# Patient Record
Sex: MALE | Race: WHITE | NOT HISPANIC OR LATINO | Employment: OTHER | URBAN - METROPOLITAN AREA
[De-identification: names, ages, dates, MRNs, and addresses within clinical notes are randomized per-mention and may not be internally consistent; named-entity substitution may affect disease eponyms.]

---

## 2022-05-11 ENCOUNTER — OFFICE VISIT (OUTPATIENT)
Dept: CARDIOLOGY CLINIC | Facility: CLINIC | Age: 77
End: 2022-05-11
Payer: COMMERCIAL

## 2022-05-11 VITALS
HEIGHT: 69 IN | DIASTOLIC BLOOD PRESSURE: 70 MMHG | BODY MASS INDEX: 27.99 KG/M2 | WEIGHT: 189 LBS | HEART RATE: 60 BPM | SYSTOLIC BLOOD PRESSURE: 120 MMHG

## 2022-05-11 DIAGNOSIS — I25.10 ATHEROSCLEROSIS OF NATIVE CORONARY ARTERY OF NATIVE HEART WITHOUT ANGINA PECTORIS: Primary | ICD-10-CM

## 2022-05-11 DIAGNOSIS — I10 PRIMARY HYPERTENSION: ICD-10-CM

## 2022-05-11 DIAGNOSIS — E78.2 MIXED HYPERLIPIDEMIA: ICD-10-CM

## 2022-05-11 DIAGNOSIS — I35.0 NONRHEUMATIC AORTIC VALVE STENOSIS: ICD-10-CM

## 2022-05-11 PROCEDURE — 99215 OFFICE O/P EST HI 40 MIN: CPT | Performed by: INTERNAL MEDICINE

## 2022-05-11 RX ORDER — HYDROCHLOROTHIAZIDE 12.5 MG/1
12.5 TABLET ORAL DAILY
COMMUNITY
Start: 2022-04-03

## 2022-05-11 RX ORDER — ASPIRIN 81 MG/1
81 TABLET ORAL DAILY
COMMUNITY
Start: 2022-05-11

## 2022-05-11 RX ORDER — AMLODIPINE BESYLATE 2.5 MG/1
2.5 TABLET ORAL DAILY
COMMUNITY
Start: 2022-03-12

## 2022-05-11 RX ORDER — LISINOPRIL 20 MG/1
20 TABLET ORAL DAILY
COMMUNITY
Start: 2022-03-01

## 2022-05-11 RX ORDER — ATORVASTATIN CALCIUM 20 MG/1
20 TABLET, FILM COATED ORAL DAILY
COMMUNITY
Start: 2022-04-03

## 2022-05-11 RX ORDER — METOPROLOL SUCCINATE 25 MG/1
25 TABLET, EXTENDED RELEASE ORAL DAILY
COMMUNITY

## 2022-05-11 NOTE — PROGRESS NOTES
Consultation - Cardiology   Krish Mandujano 77 y o  male MRN: [id number]    Encounter: [id number]    Assessment/Plan     Assessment:     Coronary Atherosclerosis  Hypertension  Hyperlipidemia  Aortic Stenosis    Plan:    Coronary Atherosclerosis: based on Chest CT  Continue with aspirin and atorvastatin  He has no angina at this time  Hypertension: His BP Is currently well controlled on current medical therapy  Hyperlipidemia: Continue atorvastatin  Will get a copy of his last lipid panel  Aortic Stenosis: moderate last year  Update echocardiogram      History of Present Illness   Physician Requesting Consult: No att  providers found  Reason for Consult / Principal Problem: Coronary Atherosclerosis  HPI: Krish Mandujano is a 77y o  year old male who presents with a history of coronary atherosclerosis, HTN and dyslipidemia  He had an echocardiogram last year that revealed moderate aortic stenosis  He recently moved to this area  He has no chest pain, dyspnea, palptiations or fatigue  He has no prior history of obstructive CAD  He has had a CT calcium score in the past that revealed coronary athero  Former smoker    Mother passed away from acute MI at age 77  Father had CAD and CABG  Review of Systems   Constitutional: Negative  HENT: Negative  Eyes: Negative  Cardiovascular: Negative  Respiratory: Negative  Endocrine: Negative  Hematologic/Lymphatic: Negative  Skin: Negative  Musculoskeletal: Negative  Gastrointestinal: Negative  Genitourinary: Negative  Neurological: Negative  Psychiatric/Behavioral: Negative  Allergic/Immunologic: Negative  Historical Information   No past medical history on file  No past surgical history on file      Social History:  Social History     Substance and Sexual Activity   Alcohol Use Not on file     Social History     Substance and Sexual Activity   Drug Use Not on file     Social History     Tobacco Use   Smoking Status Not on file   Smokeless Tobacco Not on file       Family History:   No family history on file  Meds/Allergies   No Known Allergies    Current Outpatient Medications:     amLODIPine (NORVASC) 2 5 mg tablet, Take 2 5 mg by mouth in the morning , Disp: , Rfl:     atorvastatin (LIPITOR) 20 mg tablet, Take 20 mg by mouth in the morning , Disp: , Rfl:     hydrochlorothiazide (HYDRODIURIL) 12 5 mg tablet, Take 12 5 mg by mouth in the morning , Disp: , Rfl:     lisinopril (ZESTRIL) 20 mg tablet, Take 20 mg by mouth in the morning , Disp: , Rfl:     metoprolol succinate (TOPROL-XL) 25 mg 24 hr tablet, Take 25 mg by mouth in the morning , Disp: , Rfl:     Vitals:   Pulse: 60  Blood Pressue: 120/70  Weight: 85 7 kg (189 lb)      Physical Exam  Constitutional:       General: He is not in acute distress  Appearance: He is well-developed  He is not diaphoretic  HENT:      Head: Normocephalic  Eyes:      General: No scleral icterus  Right eye: No discharge  Conjunctiva/sclera: Conjunctivae normal    Neck:      Vascular: No JVD  Cardiovascular:      Rate and Rhythm: Normal rate and regular rhythm  Heart sounds: Murmur heard  No friction rub  No gallop  Pulmonary:      Effort: Pulmonary effort is normal  No respiratory distress  Breath sounds: Normal breath sounds  No wheezing or rales  Abdominal:      General: Bowel sounds are normal  There is no distension  Palpations: Abdomen is soft  Tenderness: There is no abdominal tenderness  There is no rebound  Musculoskeletal:         General: No tenderness or deformity  Cervical back: Normal range of motion  Skin:     General: Skin is warm and dry  Neurological:      Mental Status: He is alert and oriented to person, place, and time           [unfilled]    Invasive Devices  Report    None                 No results found for: NA, CL, CO2, ANIONGAP, BUN, CREATININE, EGFR, GLUCOSE, CALCIUM, AST, ALT, ALKPHOS, PROT, BILITOT  No results found for: WBC, HGB, PLT  No components found for: TROP    Imaging:     EKG: NSR Normal ECG      Counseling / Coordination of Care  Total floor / unit time spent today 45 minutes  Greater than 50% of total time was spent with the patient and / or family counseling and / or coordination of care  A description of the counseling / coordination of care

## 2022-06-14 ENCOUNTER — HOSPITAL ENCOUNTER (OUTPATIENT)
Dept: NON INVASIVE DIAGNOSTICS | Facility: CLINIC | Age: 77
Discharge: HOME | End: 2022-06-14
Payer: COMMERCIAL

## 2022-06-14 VITALS
DIASTOLIC BLOOD PRESSURE: 70 MMHG | HEART RATE: 60 BPM | WEIGHT: 189 LBS | BODY MASS INDEX: 27.99 KG/M2 | SYSTOLIC BLOOD PRESSURE: 120 MMHG | HEIGHT: 69 IN

## 2022-06-14 DIAGNOSIS — I35.0 NONRHEUMATIC AORTIC VALVE STENOSIS: ICD-10-CM

## 2022-06-14 LAB
AORTIC ROOT: 3.2 CM
AORTIC VALVE MEAN VELOCITY: 26 M/S
APICAL FOUR CHAMBER EJECTION FRACTION: 69 %
ASCENDING AORTA: 3.5 CM
AV AREA BY CONTINUOUS VTI: 1.1 CM2
AV AREA PEAK VELOCITY: 1.2 CM2
AV LVOT MEAN GRADIENT: 3 MMHG
AV LVOT PEAK GRADIENT: 5 MMHG
AV MEAN GRADIENT: 32 MMHG
AV PEAK GRADIENT: 53 MMHG
AV VALVE AREA: 1.14 CM2
AV VELOCITY RATIO: 0.31
DOP CALC AO PEAK VEL: 3.8 M/S
DOP CALC AO VTI: 86.75 CM
DOP CALC LVOT AREA: 3.8 CM2
DOP CALC LVOT DIAMETER: 2.2 CM
DOP CALC LVOT PEAK VEL VTI: 26 CM
DOP CALC LVOT PEAK VEL: 1.17 M/S
DOP CALC LVOT STROKE INDEX: 49.5 ML/M2
DOP CALC LVOT STROKE VOLUME: 98.78 CM3
E WAVE DECELERATION TIME: 232 MS
FRACTIONAL SHORTENING: 41 % (ref 28–44)
INTERVENTRICULAR SEPTUM IN DIASTOLE (PARASTERNAL SHORT AXIS VIEW): 1.2 CM
INTERVENTRICULAR SEPTUM: 1.2 CM (ref 0.6–1.1)
LAAS-AP2: 21.9 CM2
LAAS-AP4: 17.1 CM2
LEFT ATRIUM AREA SYSTOLE SINGLE PLANE A4C: 17 CM2
LEFT ATRIUM SIZE: 3.9 CM
LEFT INTERNAL DIMENSION IN SYSTOLE: 2.4 CM (ref 2.1–4)
LEFT VENTRICULAR INTERNAL DIMENSION IN DIASTOLE: 4.1 CM (ref 3.5–6)
LEFT VENTRICULAR POSTERIOR WALL IN END DIASTOLE: 1 CM
LEFT VENTRICULAR STROKE VOLUME: 55 ML
LVSV (TEICH): 55 ML
MV E'TISSUE VEL-SEP: 9 CM/S
MV PEAK A VEL: 1.04 M/S
MV PEAK E VEL: 92 CM/S
MV STENOSIS PRESSURE HALF TIME: 67 MS
MV VALVE AREA P 1/2 METHOD: 3.28 CM2
RA PRESSURE ESTIMATED: 3 MMHG
RIGHT ATRIUM AREA SYSTOLE A4C: 16.7 CM2
RIGHT VENTRICLE ID DIMENSION: 3.7 CM
RV PSP: 35 MMHG
SL CV LEFT ATRIUM LENGTH A2C: 5.8 CM
SL CV LV EF: 65
SL CV PED ECHO LEFT VENTRICLE DIASTOLIC VOLUME (MOD BIPLANE) 2D: 75 ML
SL CV PED ECHO LEFT VENTRICLE SYSTOLIC VOLUME (MOD BIPLANE) 2D: 20 ML
TR MAX PG: 32 MMHG
TR PEAK VELOCITY: 2.8 M/S
TRICUSPID ANNULAR PLANE SYSTOLIC EXCURSION: 2.8 CM
TRICUSPID VALVE PEAK REGURGITATION VELOCITY: 2.83 M/S

## 2022-06-14 PROCEDURE — 93306 TTE W/DOPPLER COMPLETE: CPT

## 2022-06-14 PROCEDURE — 93306 TTE W/DOPPLER COMPLETE: CPT | Performed by: INTERNAL MEDICINE

## 2022-06-17 ENCOUNTER — TELEPHONE (OUTPATIENT)
Dept: CARDIOLOGY CLINIC | Facility: CLINIC | Age: 77
End: 2022-06-17

## 2022-06-17 NOTE — TELEPHONE ENCOUNTER
----- Message from Raul Abebe MD sent at 6/17/2022 12:52 PM EDT -----  Moderate aortic stenosis  Will continue to follow

## 2023-11-09 ENCOUNTER — OFFICE VISIT (OUTPATIENT)
Dept: URGENT CARE | Facility: CLINIC | Age: 78
End: 2023-11-09
Payer: COMMERCIAL

## 2023-11-09 VITALS
HEART RATE: 87 BPM | WEIGHT: 181.4 LBS | OXYGEN SATURATION: 97 % | SYSTOLIC BLOOD PRESSURE: 110 MMHG | RESPIRATION RATE: 18 BRPM | BODY MASS INDEX: 26.79 KG/M2 | DIASTOLIC BLOOD PRESSURE: 52 MMHG | TEMPERATURE: 97.9 F

## 2023-11-09 DIAGNOSIS — R06.2 WHEEZING: ICD-10-CM

## 2023-11-09 DIAGNOSIS — J20.9 ACUTE BRONCHITIS, UNSPECIFIED ORGANISM: Primary | ICD-10-CM

## 2023-11-09 PROCEDURE — 99213 OFFICE O/P EST LOW 20 MIN: CPT | Performed by: NURSE PRACTITIONER

## 2023-11-09 RX ORDER — ALBUTEROL SULFATE 90 UG/1
2 AEROSOL, METERED RESPIRATORY (INHALATION) EVERY 6 HOURS PRN
Qty: 8.5 G | Refills: 0 | Status: SHIPPED | OUTPATIENT
Start: 2023-11-09

## 2023-11-09 RX ORDER — AZITHROMYCIN 250 MG/1
TABLET, FILM COATED ORAL
Qty: 6 TABLET | Refills: 0 | Status: SHIPPED | OUTPATIENT
Start: 2023-11-09 | End: 2023-11-13

## 2023-11-09 RX ORDER — IPRATROPIUM BROMIDE AND ALBUTEROL SULFATE 2.5; .5 MG/3ML; MG/3ML
3 SOLUTION RESPIRATORY (INHALATION) ONCE
Status: COMPLETED | OUTPATIENT
Start: 2023-11-09 | End: 2023-11-09

## 2023-11-09 RX ADMIN — IPRATROPIUM BROMIDE AND ALBUTEROL SULFATE 3 ML: 2.5; .5 SOLUTION RESPIRATORY (INHALATION) at 10:58

## 2023-11-09 NOTE — PROGRESS NOTES
[facility]        NAME: Deepa Gabriel is a 69 y.o. male  : 1945    MRN: [id number]  DATE: 2023  TIME: 12:55 PM    Assessment and Plan   Acute bronchitis, unspecified organism [J20.9]  1. Acute bronchitis, unspecified organism  ipratropium-albuterol (DUO-NEB) 0.5-2.5 mg/3 mL inhalation solution 3 mL    albuterol (ProAir HFA) 90 mcg/act inhaler    azithromycin (ZITHROMAX) 250 mg tablet      2. Wheezing  ipratropium-albuterol (DUO-NEB) 0.5-2.5 mg/3 mL inhalation solution 3 mL    albuterol (ProAir HFA) 90 mcg/act inhaler        Acute symptomatic x10 days worsening in the last 24 to 48 hours. DuoNeb provided in office with improvement in symptoms and decreased wheezing on exam O2 persistent 97%. Will recommend start albuterol inhaler at home 2 puffs every 6-8 hours as needed and Z-Erich educated on side effects proper use of medication follow-up with primary care urgent care or ED if any worsening of symptoms such as shortness of breath worsening wheezing worsening of symptoms    Patient Instructions       Follow up with PCP in 3-5 days. Proceed to  ER if symptoms worsen. Chief Complaint     Chief Complaint   Patient presents with   • Cough     Patient states that 10days ago started with a cough, nasal congestion. Noticed yesterday that he is having chest congestion, is wheezing, now has a productive cough and feels tight in the chest.          History of Present Illness       Patient is a 67-year-old male arrives with 10 days of cold-like symptoms. Patient reports in the last 48 to 72 hours starting to feel worse with now chest tightness congestion cough productive mucus of whitish color. He was a previous smoker for approximately 10 years but quit 20 years ago. Did take an at home COVID rapid which was negative.   Denies shortness of breath chest pain dizziness and fever        Review of Systems   Review of Systems   Constitutional:  Negative for activity change, appetite change, chills, fatigue and fever. HENT:  Negative for congestion, rhinorrhea, sinus pressure, sinus pain and sore throat. Respiratory:  Positive for cough and chest tightness. Negative for shortness of breath and wheezing. Gastrointestinal:  Negative for constipation, diarrhea, nausea and vomiting. Musculoskeletal:  Negative for myalgias. Skin:  Negative for color change, pallor and rash. Neurological:  Negative for dizziness, syncope, weakness, light-headedness and headaches. Hematological:  Negative for adenopathy. Psychiatric/Behavioral:  Negative for agitation and confusion. Current Medications       Current Outpatient Medications:   •  albuterol (ProAir HFA) 90 mcg/act inhaler, Inhale 2 puffs every 6 (six) hours as needed for wheezing or shortness of breath, Disp: 8.5 g, Rfl: 0  •  azithromycin (ZITHROMAX) 250 mg tablet, Take 2 tablets today then 1 tablet daily x 4 days, Disp: 6 tablet, Rfl: 0  •  amLODIPine (NORVASC) 2.5 mg tablet, Take 2.5 mg by mouth in the morning., Disp: , Rfl:   •  aspirin (ECOTRIN LOW STRENGTH) 81 mg EC tablet, Take 1 tablet (81 mg total) by mouth in the morning., Disp: , Rfl:   •  atorvastatin (LIPITOR) 20 mg tablet, Take 20 mg by mouth in the morning., Disp: , Rfl:   •  hydrochlorothiazide (HYDRODIURIL) 12.5 mg tablet, Take 12.5 mg by mouth in the morning., Disp: , Rfl:   •  lisinopril (ZESTRIL) 20 mg tablet, Take 20 mg by mouth in the morning., Disp: , Rfl:   •  metoprolol succinate (TOPROL-XL) 25 mg 24 hr tablet, Take 25 mg by mouth in the morning., Disp: , Rfl:   No current facility-administered medications for this visit.     Current Allergies     Allergies as of 11/09/2023   • (No Known Allergies)            The following portions of the patient's history were reviewed and updated as appropriate: allergies, current medications, past family history, past medical history, past social history, past surgical history and problem list.     Past Medical History: Diagnosis Date   • Basal cell carcinoma (BCC)    • Coronary artery disease    • High blood pressure    • High cholesterol    • Prostate CA [facility])        Past Surgical History:   Procedure Laterality Date   • COLONOSCOPY  2014   • MOHS SURGERY  2004   • PROSTATECTOMY  2006       Family History   Problem Relation Age of Onset   • Sudden death Mother         cardiac   • Hypertension Father    • Coronary artery disease Father    • Cancer Father    • Hyperlipidemia Father          Medications have been verified. Objective   /52   Pulse 87   Temp 97.9 °F (36.6 °C) (Tympanic)   Resp 18   Wt 82.3 kg (181 lb 6.4 oz)   SpO2 97%   BMI 26.79 kg/m²   No LMP for male patient. Physical Exam     Physical Exam  Vitals and nursing note reviewed. Constitutional:       General: He is not in acute distress. Appearance: Normal appearance. He is ill-appearing. He is not toxic-appearing or diaphoretic. HENT:      Head: Normocephalic and atraumatic. Right Ear: Tympanic membrane, ear canal and external ear normal. There is no impacted cerumen. Left Ear: Tympanic membrane, ear canal and external ear normal. There is no impacted cerumen. Nose: No congestion or rhinorrhea. Mouth/Throat:      Mouth: Mucous membranes are moist.      Pharynx: Posterior oropharyngeal erythema present. Eyes:      General: No scleral icterus. Right eye: No discharge. Left eye: No discharge. Conjunctiva/sclera: Conjunctivae normal.   Cardiovascular:      Rate and Rhythm: Normal rate and regular rhythm. Pulmonary:      Effort: Pulmonary effort is normal. No respiratory distress. Breath sounds: No stridor. Wheezing present. No rhonchi or rales. Musculoskeletal:         General: Normal range of motion. Cervical back: Normal range of motion. Lymphadenopathy:      Cervical: Cervical adenopathy present. Skin:     General: Skin is dry.    Neurological:      Mental Status: He is alert and oriented to person, place, and time. Psychiatric:         Mood and Affect: Mood normal.         Behavior: Behavior normal.         Thought Content:  Thought content normal.         Judgment: Judgment normal.

## 2024-01-03 ENCOUNTER — OFFICE VISIT (OUTPATIENT)
Dept: CARDIOLOGY CLINIC | Facility: CLINIC | Age: 79
End: 2024-01-03
Payer: COMMERCIAL

## 2024-01-03 VITALS
DIASTOLIC BLOOD PRESSURE: 60 MMHG | HEIGHT: 69 IN | WEIGHT: 186 LBS | HEART RATE: 57 BPM | BODY MASS INDEX: 27.55 KG/M2 | SYSTOLIC BLOOD PRESSURE: 124 MMHG

## 2024-01-03 DIAGNOSIS — R00.2 PALPITATIONS: ICD-10-CM

## 2024-01-03 DIAGNOSIS — I35.0 NONRHEUMATIC AORTIC VALVE STENOSIS: ICD-10-CM

## 2024-01-03 DIAGNOSIS — I10 PRIMARY HYPERTENSION: Primary | ICD-10-CM

## 2024-01-03 PROCEDURE — 99214 OFFICE O/P EST MOD 30 MIN: CPT | Performed by: INTERNAL MEDICINE

## 2024-01-03 PROCEDURE — 93000 ELECTROCARDIOGRAM COMPLETE: CPT | Performed by: INTERNAL MEDICINE

## 2024-01-03 NOTE — PROGRESS NOTES
Cardiology Follow Up    Song Camargo  1945  [id number]  [facility] [place]  [address] [facility]  [facility] [id number] [phone]    1. Primary hypertension  POCT ECG      2. Palpitations  POCT ECG          Interval History: Followup HTN.    Reports a few episodes of lightheadedness on standing. No chest pain. Rare palpitations that last for about one minute. No significant dyspnea.     Medical Problems      Past Medical History:   Diagnosis Date    Basal cell carcinoma (BCC)     Coronary artery disease     High blood pressure     High cholesterol     Prostate CA (HCC)      Social History     Socioeconomic History    Marital status: /Civil Union     Spouse name: Not on file    Number of children: Not on file    Years of education: Not on file    Highest education level: Not on file   Occupational History    Not on file   Tobacco Use    Smoking status: Not on file    Smokeless tobacco: Not on file   Substance and Sexual Activity    Alcohol use: Not on file    Drug use: Not on file    Sexual activity: Not on file   Other Topics Concern    Not on file   Social History Narrative    Not on file     Social Determinants of Health     Financial Resource Strain: Not on file   Food Insecurity: Not on file   Transportation Needs: Not on file   Physical Activity: Not on file   Stress: Not on file   Social Connections: Not on file   Intimate Partner Violence: Not on file   Housing Stability: Not on file      Family History   Problem Relation Age of Onset    Sudden death Mother         cardiac    Hypertension Father     Coronary artery disease Father     Cancer Father     Hyperlipidemia Father      Past Surgical History:   Procedure Laterality Date    COLONOSCOPY  2014    MOHS SURGERY  2004    PROSTATECTOMY  2006       Current Outpatient Medications:     albuterol (ProAir HFA) 90 mcg/act inhaler, Inhale 2 puffs every 6 (six) hours as  "needed for wheezing or shortness of breath, Disp: 8.5 g, Rfl: 0    amLODIPine (NORVASC) 2.5 mg tablet, Take 2.5 mg by mouth in the morning., Disp: , Rfl:     atorvastatin (LIPITOR) 20 mg tablet, Take 20 mg by mouth in the morning., Disp: , Rfl:     hydrochlorothiazide (HYDRODIURIL) 12.5 mg tablet, Take 12.5 mg by mouth in the morning., Disp: , Rfl:     lisinopril (ZESTRIL) 20 mg tablet, Take 20 mg by mouth in the morning., Disp: , Rfl:     metoprolol succinate (TOPROL-XL) 25 mg 24 hr tablet, Take 25 mg by mouth in the morning., Disp: , Rfl:   No Known Allergies    Labs:     Chemistry    No results found for: \"NA\", \"K\", \"CL\", \"CO2\", \"BUN\", \"CREATININE\" No results found for: \"CALCIUM\", \"ALKPHOS\", \"AST\", \"ALT\", \"BILITOT\"         No results found for: \"CHOL\"  No results found for: \"HDL\"  No results found for: \"LDLCALC\"  No results found for: \"TRIG\"  No results found for: \"CHOLHDL\"    Imaging: No results found.    EKG: NSR Normal ECG.    Review of Systems   Constitutional: Negative.   HENT: Negative.     Eyes: Negative.    Cardiovascular:  Positive for irregular heartbeat.   Respiratory: Negative.     Endocrine: Negative.    Hematologic/Lymphatic: Negative.    Skin: Negative.    Musculoskeletal: Negative.    Gastrointestinal: Negative.    Genitourinary: Negative.    Neurological: Negative.    Psychiatric/Behavioral: Negative.     Allergic/Immunologic: Negative.        Vitals:    01/03/24 0758   BP: 124/60   Pulse: 57           Physical Exam  Vitals reviewed.   Constitutional:       Appearance: Normal appearance.   HENT:      Head: Normocephalic.      Nose: Nose normal.      Mouth/Throat:      Mouth: Mucous membranes are moist.      Pharynx: Oropharynx is clear.   Eyes:      General: No scleral icterus.     Conjunctiva/sclera: Conjunctivae normal.   Cardiovascular:      Rate and Rhythm: Normal rate and regular rhythm.      Heart sounds: Murmur heard.      No friction rub. No gallop.   Pulmonary:      Effort: Pulmonary " effort is normal. No respiratory distress.      Breath sounds: Normal breath sounds. No wheezing or rales.   Abdominal:      General: Abdomen is flat. Bowel sounds are normal. There is no distension.      Palpations: Abdomen is soft.      Tenderness: There is no abdominal tenderness. There is no guarding.   Musculoskeletal:      Cervical back: Normal range of motion and neck supple.      Right lower leg: No edema.      Left lower leg: No edema.   Skin:     General: Skin is warm and dry.   Neurological:      General: No focal deficit present.      Mental Status: He is alert and oriented to person, place, and time.   Psychiatric:         Mood and Affect: Mood normal.         Behavior: Behavior normal.         Discussion/Summary:    Coronary Atherosclerosis: based on Chest CT. Continue with aspirin and atorvastatin. He has no angina at this time.      Hypertension: His BP Is currently well controlled on current medical therapy.      Hyperlipidemia: Continue atorvastatin.      Aortic Stenosis: moderate last year. Update echocardiogram.       The patient was counseled regarding diagnostic results, instructions for management, risk factor reductions, impressions. total time of encounter was 25 minutes and 15 minutes was spent counseling.

## 2024-02-05 ENCOUNTER — HOSPITAL ENCOUNTER (OUTPATIENT)
Dept: NON INVASIVE DIAGNOSTICS | Facility: HOSPITAL | Age: 79
Discharge: HOME | End: 2024-02-05
Attending: INTERNAL MEDICINE
Payer: COMMERCIAL

## 2024-02-05 VITALS
SYSTOLIC BLOOD PRESSURE: 124 MMHG | BODY MASS INDEX: 27.55 KG/M2 | HEIGHT: 69 IN | WEIGHT: 186 LBS | DIASTOLIC BLOOD PRESSURE: 60 MMHG | HEART RATE: 60 BPM

## 2024-02-05 DIAGNOSIS — I35.0 NONRHEUMATIC AORTIC VALVE STENOSIS: ICD-10-CM

## 2024-02-05 PROCEDURE — 93306 TTE W/DOPPLER COMPLETE: CPT | Performed by: INTERNAL MEDICINE

## 2024-02-05 PROCEDURE — 93306 TTE W/DOPPLER COMPLETE: CPT

## 2024-02-06 LAB
AORTIC ROOT: 3.3 CM
AORTIC VALVE MEAN VELOCITY: 31.2 M/S
APICAL FOUR CHAMBER EJECTION FRACTION: 70 %
ASCENDING AORTA: 3.5 CM
AV AREA BY CONTINUOUS VTI: 0.9 CM2
AV AREA PEAK VELOCITY: 0.9 CM2
AV LVOT MEAN GRADIENT: 2 MMHG
AV LVOT PEAK GRADIENT: 4 MMHG
AV MEAN GRADIENT: 42 MMHG
AV PEAK GRADIENT: 68 MMHG
AV VALVE AREA: 0.9 CM2
AV VELOCITY RATIO: 0.25
BSA FOR ECHO PROCEDURE: 2 M2
DOP CALC AO PEAK VEL: 4.14 M/S
DOP CALC AO VTI: 99.81 CM
DOP CALC LVOT AREA: 3.46 CM2
DOP CALC LVOT CARDIAC INDEX: 2.55 L/MIN/M2
DOP CALC LVOT CARDIAC OUTPUT: 5.09 L/MIN
DOP CALC LVOT DIAMETER: 2.1 CM
DOP CALC LVOT PEAK VEL VTI: 26 CM
DOP CALC LVOT PEAK VEL: 1.04 M/S
DOP CALC LVOT STROKE INDEX: 43 ML/M2
DOP CALC LVOT STROKE VOLUME: 90.01
DOP CALC MV VTI: 32.16 CM
E WAVE DECELERATION TIME: 207 MS
E/A RATIO: 1.27
FRACTIONAL SHORTENING: 40 (ref 28–44)
INTERVENTRICULAR SEPTUM IN DIASTOLE (PARASTERNAL SHORT AXIS VIEW): 1 CM
INTERVENTRICULAR SEPTUM: 1 CM (ref 0.6–1.1)
LAAS-AP2: 19.2 CM2
LAAS-AP4: 17.3 CM2
LEFT ATRIUM SIZE: 4 CM
LEFT ATRIUM VOLUME (MOD BIPLANE): 50 ML
LEFT ATRIUM VOLUME INDEX (MOD BIPLANE): 25 ML/M2
LEFT INTERNAL DIMENSION IN SYSTOLE: 2.6 CM (ref 2.1–4)
LEFT VENTRICLE DIASTOLIC VOLUME (MOD BIPLANE): 95 ML
LEFT VENTRICLE DIASTOLIC VOLUME INDEX (MOD BIPLANE): 47.5 ML/M2
LEFT VENTRICLE SYSTOLIC VOLUME (MOD BIPLANE): 32 ML
LEFT VENTRICLE SYSTOLIC VOLUME INDEX (MOD BIPLANE): 16 ML/M2
LEFT VENTRICULAR INTERNAL DIMENSION IN DIASTOLE: 4.3 CM (ref 3.5–6)
LEFT VENTRICULAR POSTERIOR WALL IN END DIASTOLE: 0.8 CM
LEFT VENTRICULAR STROKE VOLUME: 57 ML
LV EF: 67 %
LVSV (TEICH): 57 ML
MV E'TISSUE VEL-LAT: 9 CM/S
MV E'TISSUE VEL-SEP: 9 CM/S
MV MEAN GRADIENT: 2 MMHG
MV PEAK A VEL: 0.88 M/S
MV PEAK E VEL: 112 CM/S
MV PEAK GRADIENT: 5 MMHG
MV STENOSIS PRESSURE HALF TIME: 60 MS
MV VALVE AREA BY CONTINUITY EQUATION: 2.8 CM2
MV VALVE AREA P 1/2 METHOD: 3.67
RIGHT ATRIAL 2D VOLUME: 40 ML
RIGHT ATRIUM AREA SYSTOLE A4C: 15.4 CM2
RIGHT VENTRICLE ID DIMENSION: 3.7 CM
SINOTUBULAR JUNCTION: 2.2 CM
SL CV LEFT ATRIUM LENGTH A2C: 5.5 CM
SL CV LV EF: 60
SL CV PED ECHO LEFT VENTRICLE DIASTOLIC VOLUME (MOD BIPLANE) 2D: 82 ML
SL CV PED ECHO LEFT VENTRICLE SYSTOLIC VOLUME (MOD BIPLANE) 2D: 25 ML
SL CV SINUS OF VALSALVA 2D: 2.9 CM
STJ: 2.2 CM
TR MAX PG: 28 MMHG
TR PEAK VELOCITY: 2.6 M/S
TRICUSPID ANNULAR PLANE SYSTOLIC EXCURSION: 2.4 CM
TRICUSPID VALVE PEAK REGURGITATION VELOCITY: 2.64 M/S

## 2024-02-21 ENCOUNTER — TELEPHONE (OUTPATIENT)
Dept: CARDIOLOGY CLINIC | Facility: CLINIC | Age: 79
End: 2024-02-21

## 2024-02-27 ENCOUNTER — TELEPHONE (OUTPATIENT)
Dept: CARDIAC SURGERY | Facility: CLINIC | Age: 79
End: 2024-02-27

## 2024-03-04 ENCOUNTER — OFFICE VISIT (OUTPATIENT)
Dept: CARDIAC SURGERY | Facility: CLINIC | Age: 79
End: 2024-03-04
Payer: COMMERCIAL

## 2024-03-04 ENCOUNTER — TELEPHONE (OUTPATIENT)
Dept: CARDIOLOGY CLINIC | Facility: CLINIC | Age: 79
End: 2024-03-04

## 2024-03-04 ENCOUNTER — PREP FOR PROCEDURE (OUTPATIENT)
Dept: CARDIOLOGY CLINIC | Facility: CLINIC | Age: 79
End: 2024-03-04

## 2024-03-04 VITALS
HEART RATE: 70 BPM | DIASTOLIC BLOOD PRESSURE: 60 MMHG | HEIGHT: 69 IN | BODY MASS INDEX: 27.25 KG/M2 | WEIGHT: 184 LBS | OXYGEN SATURATION: 99 % | SYSTOLIC BLOOD PRESSURE: 128 MMHG

## 2024-03-04 DIAGNOSIS — I35.0 NONRHEUMATIC AORTIC VALVE STENOSIS: Primary | ICD-10-CM

## 2024-03-04 PROBLEM — E78.2 MIXED HYPERLIPIDEMIA: Status: ACTIVE | Noted: 2018-04-13

## 2024-03-04 PROBLEM — I10 BENIGN ESSENTIAL HYPERTENSION: Status: ACTIVE | Noted: 2017-08-01

## 2024-03-04 PROBLEM — Z85.46 HISTORY OF PROSTATE CANCER: Status: ACTIVE | Noted: 2024-03-04

## 2024-03-04 PROBLEM — Z82.49 FAMILY HISTORY OF ISCHEMIC HEART DISEASE (IHD): Status: ACTIVE | Noted: 2017-08-01

## 2024-03-04 PROCEDURE — 99205 OFFICE O/P NEW HI 60 MIN: CPT | Performed by: THORACIC SURGERY (CARDIOTHORACIC VASCULAR SURGERY)

## 2024-03-04 NOTE — H&P (VIEW-ONLY)
Consultation - Cardiothoracic Surgery   Song Camargo 78 y.o. male MRN: [id number]    Physician Requesting Consult: Camilo    Reason for Consult / Principal Problem: Aortic stenosis, Non-Rheumatic    History of Present Illness: Song Camargo is a 78 y.o. year old male who presents for initial outpatient surgical consultation for severe symptomatic aortic stenosis.  He has complaints of lightheadedness upon standing.  He was seen in cardiology office with updated echocardiogram revealing severe aortic stenosis on 2/5/2024.  He is now referred to the office for TAVR evaluation.     His symptoms were strongest at the end of last year.  He had periods of presyncope upon standing.  He had no syncope, no chest pain and non shortness of breath.  He lives alone all on one floor.  He has 2 sons one lives in [place] and the other in [place].  He tolerats all of his ADLs indepdently.  He has a one year old pup.  He is retired from [employer] and now works as a family .  He is a non smoker, quit in 2004.  He drinks 1 glass of red wine a night.  No illegal drug use.      Dental: one month ago, [facility]     Past Medical History:  Past Medical History:   Diagnosis Date    Arthritis     Basal cell carcinoma (BCC)     Coronary artery disease     High blood pressure     High cholesterol     Lumbar radiculopathy     Prostate CA (HCC)      Past Surgical History:   Past Surgical History:   Procedure Laterality Date    APPENDECTOMY      COLONOSCOPY  2014    MOHS SURGERY  2004    PROSTATECTOMY  2010    SHOULDER SURGERY Right     TONSILLECTOMY      VASECTOMY       Family History:  Family History   Problem Relation Age of Onset    Sudden death Mother         cardiac    Hypertension Father     Coronary artery disease Father     Cancer Father     Hyperlipidemia Father        Social History:    Social History     Substance and Sexual Activity   Alcohol Use Yes    Alcohol/week: 1.0 standard drink of  alcohol    Types: 1 Glasses of wine per week     Social History     Substance and Sexual Activity   Drug Use Never     Social History     Tobacco Use   Smoking Status Former    Current packs/day: 0.00    Types: Cigarettes    Quit date: 2004    Years since quittin.9   Smokeless Tobacco Never       Home Medications:   Prior to Admission medications    Medication Sig Start Date End Date Taking? Authorizing Provider   albuterol (ProAir HFA) 90 mcg/act inhaler Inhale 2 puffs every 6 (six) hours as needed for wheezing or shortness of breath 23   NIR Peters   amLODIPine (NORVASC) 2.5 mg tablet Take 2.5 mg by mouth in the morning. 3/12/22   Historical Provider, MD   atorvastatin (LIPITOR) 20 mg tablet Take 20 mg by mouth in the morning. 4/3/22   Historical Provider, MD   hydrochlorothiazide (HYDRODIURIL) 12.5 mg tablet Take 12.5 mg by mouth in the morning. 4/3/22   Historical Provider, MD   lisinopril (ZESTRIL) 20 mg tablet Take 20 mg by mouth in the morning. 3/1/22   Historical Provider, MD   metoprolol succinate (TOPROL-XL) 25 mg 24 hr tablet Take 25 mg by mouth in the morning.    Historical Provider, MD       Allergies:  No Known Allergies    Review of Systems:     Review of Systems   Constitutional:  Positive for activity change and fatigue.   HENT: Negative.     Eyes: Negative.    Respiratory: Negative.     Cardiovascular: Negative.    Gastrointestinal: Negative.    Endocrine: Negative.    Genitourinary: Negative.    Musculoskeletal: Negative.    Skin: Negative.    Allergic/Immunologic: Negative.    Neurological:  Positive for dizziness and light-headedness. Negative for tremors, syncope and weakness.   Hematological: Negative.    Psychiatric/Behavioral: Negative.         Vital Signs:     Vitals:    24 1346 24 1352   BP: 134/60 128/60   BP Location: Left arm Right arm   Patient Position: Sitting Sitting   Cuff Size: Standard Standard   Pulse: 70    SpO2: 99%    Weight: 83.5 kg  "(184 lb)    Height: 5' 9\" (1.753 m)        Physical Exam:     Physical Exam  Constitutional:       General: He is not in acute distress.     Appearance: He is normal weight. He is not ill-appearing, toxic-appearing or diaphoretic.   HENT:      Head: Normocephalic and atraumatic.      Right Ear: External ear normal.      Left Ear: External ear normal.      Nose: Nose normal.      Mouth/Throat:      Mouth: Mucous membranes are dry.      Pharynx: Oropharynx is clear.   Eyes:      General:         Right eye: No discharge.      Extraocular Movements: Extraocular movements intact.      Conjunctiva/sclera: Conjunctivae normal.      Pupils: Pupils are equal, round, and reactive to light.   Neck:      Vascular: Carotid bruit present.   Cardiovascular:      Rate and Rhythm: Normal rate.      Pulses: Normal pulses.      Heart sounds: Murmur heard.      Comments: III/VI systolic murmur   Pulmonary:      Effort: Pulmonary effort is normal. No respiratory distress.      Breath sounds: Normal breath sounds. No wheezing.   Abdominal:      General: There is no distension.      Palpations: Abdomen is soft.      Tenderness: There is no abdominal tenderness. There is no guarding.   Musculoskeletal:      Cervical back: Normal range of motion.      Right lower leg: No edema.      Left lower leg: No edema.   Skin:     General: Skin is warm and dry.      Coloration: Skin is not pale.      Findings: No erythema or rash.   Neurological:      General: No focal deficit present.      Mental Status: He is alert and oriented to person, place, and time.      Sensory: No sensory deficit.      Coordination: Coordination normal.   Psychiatric:         Mood and Affect: Mood normal.         Behavior: Behavior normal.         Thought Content: Thought content normal.         Judgment: Judgment normal.         Imaging Studies:     Echocardiogram:   Findings    Left Ventricle Left ventricular cavity size is normal. Wall thickness is normal. The left " ventricular ejection fraction is 60%. Systolic function is normal. Wall motion is normal. Diastolic function is moderately abnormal, consistent with grade II (pseudonormal) relaxation.   Right Ventricle Right ventricular cavity size is normal. Systolic function is normal. Wall thickness is normal.   Left Atrium The atrium is normal in size.   Right Atrium The atrium is normal in size.   Aortic Valve The aortic valve is trileaflet. The leaflets are not thickened. The leaflets are severely calcified. There is severely reduced mobility. There is no evidence of regurgitation. There is severe stenosis.   Mitral Valve Mitral valve structure is normal. There is mild.  There is mild regurgitation. There is no evidence of stenosis.   Tricuspid Valve Tricuspid valve structure is normal. There is moderate regurgitation. There is no evidence of stenosis.   Pulmonic Valve Pulmonic valve structure is normal. There is trace regurgitation. There is no evidence of stenosis.   Ascending Aorta The aortic root is normal in size.   IVC/SVC The inferior vena cava is normal in size.   Pericardium There is no pericardial effusion. The pericardium is normal in appearance.     I have personally reviewed pertinent reports.      TAVR evaluation Assessment:     [facility]: III    STS Risk Score: 1.1 %, mortality risk    Aortic Stenosis Stage: D1    5 Meter Walk Test:      Attempt 1: 5   Attempt 2: 6   Attempt 3: 6    KCCQ-12 completed        Assessment: Severe aortic stenosis; Ongoing TAVR workup    Plan:    Song Camargo has severe symptomatic aortic stenosis. Based on their STS risk assessment, they will undergo the following testing for transcatheter aortic valve replacement: gated CTA of the chest, abdomen, and pelvis, cardiac catheterization, dental clearance, and carotid ultrasound.    Once these studies have been completed, Song Camargo will follow up in our office to review the results and confirm the suitability of proceeding with  transcatheter aortic valve replacment.    Shared decision-making encounter occurred during this visit. Additionally, heart team evaluation of suitability for surgical replacement has been completed.      Song Lutherjennifer was comfortable with our recommendations, and their questions were answered to their satisfaction.  We will continue to evaluate the patient, with a final surgical recommendation pending the above work up.  Thank you for allowing us to participate in the care of this patient.     Routine referral to gastroenterology for colonoscopy screening was not indicated, as the patient is over 76 years old    SIGNATURE: Jessy Wan PA-C  DATE: March 4, 2024  TIME: 2:18 PM    * This note was completed in part utilizing [employer] direct voice recognition software.   Grammatical errors, random word insertion, spelling mistakes, and incomplete sentences may be an occasional consequence of the system secondary to software limitations, ambient noise and hardware issues. At the time of dictation, efforts were made to edit, clarify and /or correct errors. Please read the chart carefully and recognize, using context, where substitutions have occurred.  If you have any questions or concerns about the context, text or information contained within the body of this dictation, please contact myself, the provider, for further clarification.

## 2024-03-04 NOTE — PROGRESS NOTES
Consultation - Cardiothoracic Surgery   Song Camargo 78 y.o. male MRN: [id number]    Physician Requesting Consult: Camilo    Reason for Consult / Principal Problem: Aortic stenosis, Non-Rheumatic    History of Present Illness: Song Camargo is a 78 y.o. year old male who presents for initial outpatient surgical consultation for severe symptomatic aortic stenosis.  He has complaints of lightheadedness upon standing.  He was seen in cardiology office with updated echocardiogram revealing severe aortic stenosis on 2/5/2024.  He is now referred to the office for TAVR evaluation.     His symptoms were strongest at the end of last year.  He had periods of presyncope upon standing.  He had no syncope, no chest pain and non shortness of breath.  He lives alone all on one floor.  He has 2 sons one lives in [place] and the other in [place].  He tolerats all of his ADLs indepdently.  He has a one year old pup.  He is retired from [employer] and now works as a family .  He is a non smoker, quit in 2004.  He drinks 1 glass of red wine a night.  No illegal drug use.      Dental: one month ago, [facility]     Past Medical History:  Past Medical History:   Diagnosis Date    Arthritis     Basal cell carcinoma (BCC)     Coronary artery disease     High blood pressure     High cholesterol     Lumbar radiculopathy     Prostate CA (HCC)      Past Surgical History:   Past Surgical History:   Procedure Laterality Date    APPENDECTOMY      COLONOSCOPY  2014    MOHS SURGERY  2004    PROSTATECTOMY  2010    SHOULDER SURGERY Right     TONSILLECTOMY      VASECTOMY       Family History:  Family History   Problem Relation Age of Onset    Sudden death Mother         cardiac    Hypertension Father     Coronary artery disease Father     Cancer Father     Hyperlipidemia Father        Social History:    Social History     Substance and Sexual Activity   Alcohol Use Yes    Alcohol/week: 1.0 standard drink of  alcohol    Types: 1 Glasses of wine per week     Social History     Substance and Sexual Activity   Drug Use Never     Social History     Tobacco Use   Smoking Status Former    Current packs/day: 0.00    Types: Cigarettes    Quit date: 2004    Years since quittin.9   Smokeless Tobacco Never       Home Medications:   Prior to Admission medications    Medication Sig Start Date End Date Taking? Authorizing Provider   albuterol (ProAir HFA) 90 mcg/act inhaler Inhale 2 puffs every 6 (six) hours as needed for wheezing or shortness of breath 23   NIR Peters   amLODIPine (NORVASC) 2.5 mg tablet Take 2.5 mg by mouth in the morning. 3/12/22   Historical Provider, MD   atorvastatin (LIPITOR) 20 mg tablet Take 20 mg by mouth in the morning. 4/3/22   Historical Provider, MD   hydrochlorothiazide (HYDRODIURIL) 12.5 mg tablet Take 12.5 mg by mouth in the morning. 4/3/22   Historical Provider, MD   lisinopril (ZESTRIL) 20 mg tablet Take 20 mg by mouth in the morning. 3/1/22   Historical Provider, MD   metoprolol succinate (TOPROL-XL) 25 mg 24 hr tablet Take 25 mg by mouth in the morning.    Historical Provider, MD       Allergies:  No Known Allergies    Review of Systems:     Review of Systems   Constitutional:  Positive for activity change and fatigue.   HENT: Negative.     Eyes: Negative.    Respiratory: Negative.     Cardiovascular: Negative.    Gastrointestinal: Negative.    Endocrine: Negative.    Genitourinary: Negative.    Musculoskeletal: Negative.    Skin: Negative.    Allergic/Immunologic: Negative.    Neurological:  Positive for dizziness and light-headedness. Negative for tremors, syncope and weakness.   Hematological: Negative.    Psychiatric/Behavioral: Negative.         Vital Signs:     Vitals:    24 1346 24 1352   BP: 134/60 128/60   BP Location: Left arm Right arm   Patient Position: Sitting Sitting   Cuff Size: Standard Standard   Pulse: 70    SpO2: 99%    Weight: 83.5 kg  "(184 lb)    Height: 5' 9\" (1.753 m)        Physical Exam:     Physical Exam  Constitutional:       General: He is not in acute distress.     Appearance: He is normal weight. He is not ill-appearing, toxic-appearing or diaphoretic.   HENT:      Head: Normocephalic and atraumatic.      Right Ear: External ear normal.      Left Ear: External ear normal.      Nose: Nose normal.      Mouth/Throat:      Mouth: Mucous membranes are dry.      Pharynx: Oropharynx is clear.   Eyes:      General:         Right eye: No discharge.      Extraocular Movements: Extraocular movements intact.      Conjunctiva/sclera: Conjunctivae normal.      Pupils: Pupils are equal, round, and reactive to light.   Neck:      Vascular: Carotid bruit present.   Cardiovascular:      Rate and Rhythm: Normal rate.      Pulses: Normal pulses.      Heart sounds: Murmur heard.      Comments: III/VI systolic murmur   Pulmonary:      Effort: Pulmonary effort is normal. No respiratory distress.      Breath sounds: Normal breath sounds. No wheezing.   Abdominal:      General: There is no distension.      Palpations: Abdomen is soft.      Tenderness: There is no abdominal tenderness. There is no guarding.   Musculoskeletal:      Cervical back: Normal range of motion.      Right lower leg: No edema.      Left lower leg: No edema.   Skin:     General: Skin is warm and dry.      Coloration: Skin is not pale.      Findings: No erythema or rash.   Neurological:      General: No focal deficit present.      Mental Status: He is alert and oriented to person, place, and time.      Sensory: No sensory deficit.      Coordination: Coordination normal.   Psychiatric:         Mood and Affect: Mood normal.         Behavior: Behavior normal.         Thought Content: Thought content normal.         Judgment: Judgment normal.         Imaging Studies:     Echocardiogram:   Findings    Left Ventricle Left ventricular cavity size is normal. Wall thickness is normal. The left " ventricular ejection fraction is 60%. Systolic function is normal. Wall motion is normal. Diastolic function is moderately abnormal, consistent with grade II (pseudonormal) relaxation.   Right Ventricle Right ventricular cavity size is normal. Systolic function is normal. Wall thickness is normal.   Left Atrium The atrium is normal in size.   Right Atrium The atrium is normal in size.   Aortic Valve The aortic valve is trileaflet. The leaflets are not thickened. The leaflets are severely calcified. There is severely reduced mobility. There is no evidence of regurgitation. There is severe stenosis.   Mitral Valve Mitral valve structure is normal. There is mild.  There is mild regurgitation. There is no evidence of stenosis.   Tricuspid Valve Tricuspid valve structure is normal. There is moderate regurgitation. There is no evidence of stenosis.   Pulmonic Valve Pulmonic valve structure is normal. There is trace regurgitation. There is no evidence of stenosis.   Ascending Aorta The aortic root is normal in size.   IVC/SVC The inferior vena cava is normal in size.   Pericardium There is no pericardial effusion. The pericardium is normal in appearance.     I have personally reviewed pertinent reports.      TAVR evaluation Assessment:     [facility]: III    STS Risk Score: 1.1 %, mortality risk    Aortic Stenosis Stage: D1    5 Meter Walk Test:      Attempt 1: 5   Attempt 2: 6   Attempt 3: 6    KCCQ-12 completed        Assessment: Severe aortic stenosis; Ongoing TAVR workup    Plan:    Song Camargo has severe symptomatic aortic stenosis. Based on their STS risk assessment, they will undergo the following testing for transcatheter aortic valve replacement: gated CTA of the chest, abdomen, and pelvis, cardiac catheterization, dental clearance, and carotid ultrasound.    Once these studies have been completed, Song Camargo will follow up in our office to review the results and confirm the suitability of proceeding with  transcatheter aortic valve replacment.    Shared decision-making encounter occurred during this visit. Additionally, heart team evaluation of suitability for surgical replacement has been completed.      Song Lutherjennifer was comfortable with our recommendations, and their questions were answered to their satisfaction.  We will continue to evaluate the patient, with a final surgical recommendation pending the above work up.  Thank you for allowing us to participate in the care of this patient.     Routine referral to gastroenterology for colonoscopy screening was not indicated, as the patient is over 76 years old    SIGNATURE: Jessy Wan PA-C  DATE: March 4, 2024  TIME: 2:18 PM    * This note was completed in part utilizing [employer] direct voice recognition software.   Grammatical errors, random word insertion, spelling mistakes, and incomplete sentences may be an occasional consequence of the system secondary to software limitations, ambient noise and hardware issues. At the time of dictation, efforts were made to edit, clarify and /or correct errors. Please read the chart carefully and recognize, using context, where substitutions have occurred.  If you have any questions or concerns about the context, text or information contained within the body of this dictation, please contact myself, the provider, for further clarification.

## 2024-03-04 NOTE — TELEPHONE ENCOUNTER
----- Message from Jennifer Marks sent at 3/4/2024  2:39 PM EST -----  Regarding: Cath  Please schedule patient for:     Pre TAVR Cardiac Cath: to be scheduled in the next few weeks. Patient has [facility] as primary insurance and is getting bloodwork done in the next week or 2.    Patient is requesting Dr. Azeem Sharpe for the cath    To be done at: [facility]     To be done by:     Please address any questions regarding this request to Jennifer Floyd or Paula Landa,     Thank you.

## 2024-03-04 NOTE — TELEPHONE ENCOUNTER
"Called patient to schedule Right + Left Heart Cath and but would like a call back since he's driving.     Thanks,  Alesia \"Era\" Luis     "

## 2024-03-05 NOTE — TELEPHONE ENCOUNTER
[facility] approved auth # [id number] for [facility]/[id number].  No site designation required.  Dr. Macias ordering.  Valid 3/5/24-4/19/24

## 2024-03-06 ENCOUNTER — APPOINTMENT (OUTPATIENT)
Dept: LAB | Facility: CLINIC | Age: 79
End: 2024-03-06
Payer: COMMERCIAL

## 2024-03-06 DIAGNOSIS — I35.0 NONRHEUMATIC AORTIC VALVE STENOSIS: ICD-10-CM

## 2024-03-06 LAB
ALBUMIN SERPL BCP-MCNC: 4.1 G/DL (ref 3.5–5)
ALP SERPL-CCNC: 39 U/L (ref 34–104)
ALT SERPL W P-5'-P-CCNC: 25 U/L (ref 7–52)
ANION GAP SERPL CALCULATED.3IONS-SCNC: 10 MMOL/L
AST SERPL W P-5'-P-CCNC: 21 U/L (ref 13–39)
BASOPHILS # BLD AUTO: 0.03 THOUSANDS/ÂΜL (ref 0–0.1)
BASOPHILS NFR BLD AUTO: 0 % (ref 0–1)
BILIRUB SERPL-MCNC: 0.7 MG/DL (ref 0.2–1)
BUN SERPL-MCNC: 15 MG/DL (ref 5–25)
CALCIUM SERPL-MCNC: 9.2 MG/DL (ref 8.4–10.2)
CHLORIDE SERPL-SCNC: 107 MMOL/L (ref 96–108)
CO2 SERPL-SCNC: 28 MMOL/L (ref 21–32)
CREAT SERPL-MCNC: 0.83 MG/DL (ref 0.6–1.3)
EOSINOPHIL # BLD AUTO: 0.19 THOUSAND/ÂΜL (ref 0–0.61)
EOSINOPHIL NFR BLD AUTO: 3 % (ref 0–6)
ERYTHROCYTE [DISTWIDTH] IN BLOOD BY AUTOMATED COUNT: 13.1 % (ref 11.6–15.1)
GFR SERPL CREATININE-BSD FRML MDRD: 84 ML/MIN/1.73SQ M
GLUCOSE P FAST SERPL-MCNC: 104 MG/DL (ref 65–99)
HCT VFR BLD AUTO: 47.4 % (ref 36.5–49.3)
HGB BLD-MCNC: 15.4 G/DL (ref 12–17)
IMM GRANULOCYTES # BLD AUTO: 0.03 THOUSAND/UL (ref 0–0.2)
IMM GRANULOCYTES NFR BLD AUTO: 0 % (ref 0–2)
LYMPHOCYTES # BLD AUTO: 2.37 THOUSANDS/ÂΜL (ref 0.6–4.47)
LYMPHOCYTES NFR BLD AUTO: 35 % (ref 14–44)
MCH RBC QN AUTO: 31.8 PG (ref 26.8–34.3)
MCHC RBC AUTO-ENTMCNC: 32.5 G/DL (ref 31.4–37.4)
MCV RBC AUTO: 98 FL (ref 82–98)
MONOCYTES # BLD AUTO: 0.6 THOUSAND/ÂΜL (ref 0.17–1.22)
MONOCYTES NFR BLD AUTO: 9 % (ref 4–12)
NEUTROPHILS # BLD AUTO: 3.58 THOUSANDS/ÂΜL (ref 1.85–7.62)
NEUTS SEG NFR BLD AUTO: 53 % (ref 43–75)
NRBC BLD AUTO-RTO: 0 /100 WBCS
PLATELET # BLD AUTO: 249 THOUSANDS/UL (ref 149–390)
PMV BLD AUTO: 11.3 FL (ref 8.9–12.7)
POTASSIUM SERPL-SCNC: 4 MMOL/L (ref 3.5–5.3)
PROT SERPL-MCNC: 6.7 G/DL (ref 6.4–8.4)
RBC # BLD AUTO: 4.84 MILLION/UL (ref 3.88–5.62)
SODIUM SERPL-SCNC: 145 MMOL/L (ref 135–147)
WBC # BLD AUTO: 6.8 THOUSAND/UL (ref 4.31–10.16)

## 2024-03-06 PROCEDURE — 80053 COMPREHEN METABOLIC PANEL: CPT

## 2024-03-06 PROCEDURE — 85025 COMPLETE CBC W/AUTO DIFF WBC: CPT

## 2024-03-06 PROCEDURE — 36415 COLL VENOUS BLD VENIPUNCTURE: CPT

## 2024-03-13 ENCOUNTER — HOSPITAL ENCOUNTER (OUTPATIENT)
Facility: HOSPITAL | Age: 79
Setting detail: OUTPATIENT SURGERY
Discharge: HOME | End: 2024-03-13
Attending: INTERNAL MEDICINE | Admitting: INTERNAL MEDICINE
Payer: COMMERCIAL

## 2024-03-13 VITALS
TEMPERATURE: 97.9 F | RESPIRATION RATE: 16 BRPM | HEIGHT: 69 IN | OXYGEN SATURATION: 95 % | DIASTOLIC BLOOD PRESSURE: 56 MMHG | BODY MASS INDEX: 27.4 KG/M2 | WEIGHT: 185 LBS | HEART RATE: 62 BPM | SYSTOLIC BLOOD PRESSURE: 103 MMHG

## 2024-03-13 DIAGNOSIS — I35.0 NONRHEUMATIC AORTIC VALVE STENOSIS: ICD-10-CM

## 2024-03-13 DIAGNOSIS — I25.10 CORONARY ARTERY DISEASE INVOLVING NATIVE CORONARY ARTERY: Primary | ICD-10-CM

## 2024-03-13 LAB
ATRIAL RATE: 70 BPM
P AXIS: 16 DEGREES
PR INTERVAL: 140 MS
QRS AXIS: 0 DEGREES
QRSD INTERVAL: 74 MS
QT INTERVAL: 400 MS
QTC INTERVAL: 432 MS
T WAVE AXIS: 9 DEGREES
VENTRICULAR RATE: 70 BPM

## 2024-03-13 PROCEDURE — 99152 MOD SED SAME PHYS/QHP 5/>YRS: CPT | Performed by: INTERNAL MEDICINE

## 2024-03-13 PROCEDURE — 93454 CORONARY ARTERY ANGIO S&I: CPT | Performed by: INTERNAL MEDICINE

## 2024-03-13 PROCEDURE — C1894 INTRO/SHEATH, NON-LASER: HCPCS | Performed by: INTERNAL MEDICINE

## 2024-03-13 PROCEDURE — 93010 ELECTROCARDIOGRAM REPORT: CPT | Performed by: INTERNAL MEDICINE

## 2024-03-13 PROCEDURE — C1887 CATHETER, GUIDING: HCPCS | Performed by: INTERNAL MEDICINE

## 2024-03-13 PROCEDURE — 99153 MOD SED SAME PHYS/QHP EA: CPT | Performed by: INTERNAL MEDICINE

## 2024-03-13 PROCEDURE — C1769 GUIDE WIRE: HCPCS | Performed by: INTERNAL MEDICINE

## 2024-03-13 PROCEDURE — 93005 ELECTROCARDIOGRAM TRACING: CPT

## 2024-03-13 RX ORDER — VERAPAMIL HYDROCHLORIDE 2.5 MG/ML
INJECTION, SOLUTION INTRAVENOUS CODE/TRAUMA/SEDATION MEDICATION
Status: DISCONTINUED | OUTPATIENT
Start: 2024-03-13 | End: 2024-03-13 | Stop reason: HOSPADM

## 2024-03-13 RX ORDER — ASPIRIN 81 MG/1
81 TABLET, CHEWABLE ORAL DAILY
Start: 2024-03-13

## 2024-03-13 RX ORDER — SODIUM CHLORIDE 9 MG/ML
125 INJECTION, SOLUTION INTRAVENOUS CONTINUOUS
Status: DISCONTINUED | OUTPATIENT
Start: 2024-03-13 | End: 2024-03-13

## 2024-03-13 RX ORDER — NITROGLYCERIN 20 MG/100ML
INJECTION INTRAVENOUS CODE/TRAUMA/SEDATION MEDICATION
Status: DISCONTINUED | OUTPATIENT
Start: 2024-03-13 | End: 2024-03-13 | Stop reason: HOSPADM

## 2024-03-13 RX ORDER — LISINOPRIL 20 MG/1
20 TABLET ORAL DAILY
Start: 2024-03-14

## 2024-03-13 RX ORDER — NITROGLYCERIN 0.4 MG/1
0.4 TABLET SUBLINGUAL ONCE AS NEEDED
Status: DISCONTINUED | OUTPATIENT
Start: 2024-03-13 | End: 2024-03-13 | Stop reason: HOSPADM

## 2024-03-13 RX ORDER — MIDAZOLAM HYDROCHLORIDE 2 MG/2ML
INJECTION, SOLUTION INTRAMUSCULAR; INTRAVENOUS CODE/TRAUMA/SEDATION MEDICATION
Status: DISCONTINUED | OUTPATIENT
Start: 2024-03-13 | End: 2024-03-13 | Stop reason: HOSPADM

## 2024-03-13 RX ORDER — FENTANYL CITRATE 50 UG/ML
INJECTION, SOLUTION INTRAMUSCULAR; INTRAVENOUS CODE/TRAUMA/SEDATION MEDICATION
Status: DISCONTINUED | OUTPATIENT
Start: 2024-03-13 | End: 2024-03-13 | Stop reason: HOSPADM

## 2024-03-13 RX ORDER — ASPIRIN 81 MG/1
324 TABLET, CHEWABLE ORAL ONCE
Status: COMPLETED | OUTPATIENT
Start: 2024-03-13 | End: 2024-03-13

## 2024-03-13 RX ORDER — LIDOCAINE HYDROCHLORIDE 10 MG/ML
INJECTION, SOLUTION EPIDURAL; INFILTRATION; INTRACAUDAL; PERINEURAL CODE/TRAUMA/SEDATION MEDICATION
Status: DISCONTINUED | OUTPATIENT
Start: 2024-03-13 | End: 2024-03-13 | Stop reason: HOSPADM

## 2024-03-13 RX ORDER — SODIUM CHLORIDE 9 MG/ML
125 INJECTION, SOLUTION INTRAVENOUS CONTINUOUS
Status: DISCONTINUED | OUTPATIENT
Start: 2024-03-13 | End: 2024-03-13 | Stop reason: HOSPADM

## 2024-03-13 RX ORDER — HEPARIN SODIUM 1000 [USP'U]/ML
INJECTION, SOLUTION INTRAVENOUS; SUBCUTANEOUS CODE/TRAUMA/SEDATION MEDICATION
Status: DISCONTINUED | OUTPATIENT
Start: 2024-03-13 | End: 2024-03-13 | Stop reason: HOSPADM

## 2024-03-13 RX ORDER — ACETAMINOPHEN 325 MG/1
975 TABLET ORAL ONCE AS NEEDED
Status: DISCONTINUED | OUTPATIENT
Start: 2024-03-13 | End: 2024-03-13 | Stop reason: HOSPADM

## 2024-03-13 RX ORDER — ONDANSETRON 2 MG/ML
4 INJECTION INTRAMUSCULAR; INTRAVENOUS ONCE AS NEEDED
Status: DISCONTINUED | OUTPATIENT
Start: 2024-03-13 | End: 2024-03-13 | Stop reason: HOSPADM

## 2024-03-13 RX ORDER — HYDROCHLOROTHIAZIDE 12.5 MG/1
12.5 TABLET ORAL DAILY
Start: 2024-03-14

## 2024-03-13 RX ORDER — ATORVASTATIN CALCIUM 40 MG/1
40 TABLET, FILM COATED ORAL DAILY
Qty: 90 TABLET | Refills: 3 | Status: SHIPPED | OUTPATIENT
Start: 2024-03-13

## 2024-03-13 RX ADMIN — SODIUM CHLORIDE 125 ML/HR: 0.9 INJECTION, SOLUTION INTRAVENOUS at 09:40

## 2024-03-13 RX ADMIN — ASPIRIN 81 MG CHEWABLE TABLET 324 MG: 81 TABLET CHEWABLE at 09:40

## 2024-03-13 NOTE — INTERVAL H&P NOTE
"Update: (This section must be completed if the H&P was completed greater than 24 hrs to procedure or admission)    H&P reviewed. After examining the patient, I find no changed to the H&P since it had been written.    Mr. Song Camargo, a 78-year-old patient, presents for outpatient elective cardiac catheterization as part of pre-TAVR evaluation for severe symptomatic aortic stenosis.  HARI 0.9 cm²    Patient seen and independently examined. I agree with assessment by cardiac surgery regarding patients candidacy for transcatheter aortic valve surgery/TAVR based on patient's preference, age and comorbidities.      /74 (BP Location: Right arm)   Pulse 71   Temp 98.2 °F (36.8 °C) (Temporal)   Resp 16   Ht 5' 9\" (1.753 m)   Wt 83.9 kg (185 lb)   SpO2 99%   BMI 27.32 kg/m²       Patient re-evaluated. Accept as history and physical.    NIR Delgado/March 13, 2024/10:37 AM  "

## 2024-03-13 NOTE — DISCHARGE INSTR - AVS FIRST PAGE
1. Please see the post cardiac catheterization dishcarge instructions.   No heavy lifting, greater than 10 lbs. or strenuous  activity for 48 hrs.    2.Remove band aid tomorrow.  Shower and wash area- wrist gently with soap and water- beginning tomorrow. Rinse and pat dry.  Apply new water seal band aid.  Repeat this process for 5 days. No powders, creams lotions or antibiotic ointments  for 5 days.  No tub baths, hot tubs or swimming for 5 days.     3. Please call our office ([phone]) if you have any fever, redness, swelling, discharge from your wrist access site.    4.No driving for 1 day

## 2024-03-26 ENCOUNTER — HOSPITAL ENCOUNTER (OUTPATIENT)
Dept: NON INVASIVE DIAGNOSTICS | Facility: HOSPITAL | Age: 79
Discharge: HOME | End: 2024-03-26
Payer: COMMERCIAL

## 2024-03-26 ENCOUNTER — HOSPITAL ENCOUNTER (OUTPATIENT)
Dept: CT IMAGING | Facility: HOSPITAL | Age: 79
Discharge: HOME | End: 2024-03-26
Payer: COMMERCIAL

## 2024-03-26 DIAGNOSIS — I35.0 NONRHEUMATIC AORTIC VALVE STENOSIS: ICD-10-CM

## 2024-03-26 PROCEDURE — 93880 EXTRACRANIAL BILAT STUDY: CPT | Performed by: SURGERY

## 2024-03-26 PROCEDURE — 75572 CT HRT W/3D IMAGE: CPT

## 2024-03-26 PROCEDURE — 74174 CTA ABD&PLVS W/CONTRAST: CPT

## 2024-03-26 PROCEDURE — 93880 EXTRACRANIAL BILAT STUDY: CPT

## 2024-03-26 RX ADMIN — IOHEXOL 180 ML: 350 INJECTION, SOLUTION INTRAVENOUS at 10:34

## 2024-04-08 ENCOUNTER — OFFICE VISIT (OUTPATIENT)
Dept: CARDIAC SURGERY | Facility: CLINIC | Age: 79
End: 2024-04-08
Payer: COMMERCIAL

## 2024-04-08 VITALS
WEIGHT: 182.2 LBS | BODY MASS INDEX: 26.98 KG/M2 | HEIGHT: 69 IN | TEMPERATURE: 97.4 F | SYSTOLIC BLOOD PRESSURE: 120 MMHG | HEART RATE: 68 BPM | OXYGEN SATURATION: 99 % | DIASTOLIC BLOOD PRESSURE: 58 MMHG

## 2024-04-08 DIAGNOSIS — I35.0 NONRHEUMATIC AORTIC VALVE STENOSIS: Primary | ICD-10-CM

## 2024-04-08 PROCEDURE — 99215 OFFICE O/P EST HI 40 MIN: CPT | Performed by: THORACIC SURGERY (CARDIOTHORACIC VASCULAR SURGERY)

## 2024-04-08 RX ORDER — CEFAZOLIN SODIUM 2 G/50ML
2000 SOLUTION INTRAVENOUS ONCE
OUTPATIENT
Start: 2024-04-08 | End: 2024-04-08

## 2024-04-08 RX ORDER — CHLORHEXIDINE GLUCONATE ORAL RINSE 1.2 MG/ML
15 SOLUTION DENTAL ONCE
OUTPATIENT
Start: 2024-04-08 | End: 2024-04-08

## 2024-04-08 NOTE — PROGRESS NOTES
Consultation - Cardiothoracic Surgery   Song Camargo 78 y.o. male MRN: [id number]      Reason for Consult / Principal Problem: Aortic stenosis, Non-Rheumatic    History of Present Illness: Song Camargo is a 78 y.o. year old male who was previously evaluated in our office by Jacob Macias D.O. for transcatheter aortic valve replacement.  During this initial consultation, arrangements were made for the following studies to be completed: gated CTA of the chest, abdomen, and pelvis, cardiac catheterization, and carotid ultrasound.     Song Camargo now presents to review the results of these tests and confirm the suitability of proceeding with transcatheter aortic valve replacment.    Of note, he has symptomatic aortic stenosis with complaints of lightheadedness and dizziness.  He lives alone but has 2 sons who live close by. He tolerates all of his ADLs independently.  He is a non smoker, quit in 2004.  He drinks one glass of wine a night and no illegal drug use. He has dental clearance.  He has completed his preoperative testing for TAVR.     Past Medical History:  Past Medical History:   Diagnosis Date    Arthritis     Basal cell carcinoma (BCC)     Coronary artery disease     High blood pressure     High cholesterol     Lumbar radiculopathy     Prostate CA (HCC)      Past Surgical History:   Past Surgical History:   Procedure Laterality Date    APPENDECTOMY      CARDIAC CATHETERIZATION N/A 3/13/2024    Procedure: Cardiac RHC/LHC;  Surgeon: Mir Sharpe MD;  Location: [facility];  Service: Cardiology    [facility]COPY  2014    [facility]  [facility]    PROSTATECTOMY  2010    SHOULDER SURGERY Right     TONSILLECTOMY      VASECTOMY         Family History:  Family History   Problem Relation Age of Onset    Sudden death Mother         cardiac    Hypertension Father     Coronary artery disease Father     Cancer Father     Hyperlipidemia Father      Social History:    Social History     Substance and Sexual  Activity   Alcohol Use Yes    Alcohol/week: 8.0 standard drinks of alcohol    Types: 8 Glasses of wine per week    Comment: drinks one glass of wine on the weekdays and maybe two on the weekend     Social History     Substance and Sexual Activity   Drug Use Never     Social History     Tobacco Use   Smoking Status Former    Current packs/day: 0.00    Types: Cigarettes    Quit date: 2004    Years since quittin.0   Smokeless Tobacco Never       Home Medications:   Prior to Admission medications    Medication Sig Start Date End Date Taking? Authorizing Provider   amLODIPine (NORVASC) 2.5 mg tablet Take 2.5 mg by mouth in the morning. 3/12/22  Yes Historical Provider, MD   aspirin 81 mg chewable tablet Chew 1 tablet (81 mg total) daily 3/13/24  Yes NIR Delgado   atorvastatin (LIPITOR) 40 mg tablet Take 1 tablet (40 mg total) by mouth daily 3/13/24  Yes NIR Delgado   hydroCHLOROthiazide 12.5 mg tablet Take 1 tablet (12.5 mg total) by mouth daily Do not start before 2024. 3/14/24  Yes NIR Delgado   lisinopril (ZESTRIL) 20 mg tablet Take 1 tablet (20 mg total) by mouth daily Do not start before 2024. 3/14/24  Yes NIR Delgado       Allergies:  No Known Allergies    Review of Systems:     Review of Systems   Constitutional: Negative.    HENT: Negative.     Eyes: Negative.    Respiratory: Negative.     Cardiovascular: Negative.    Gastrointestinal: Negative.    Endocrine: Negative.    Genitourinary: Negative.    Musculoskeletal: Negative.    Skin: Negative.    Allergic/Immunologic: Negative.    Neurological:  Positive for dizziness and light-headedness.   Hematological: Negative.    Psychiatric/Behavioral: Negative.         Vital Signs:     Vitals:    24 1404 24 1407   BP: 112/59 120/58   BP Location: Left arm Right arm   Patient Position: Sitting Sitting   Cuff Size: Standard Standard   Pulse: 68    Temp: (!) 97.4 °F (36.3 °C)    TempSrc:  "Tympanic    SpO2: 99%    Weight: 82.6 kg (182 lb 3.2 oz)    Height: 5' 9\" (1.753 m)        Physical Exam:     Physical Exam  Constitutional:       General: He is not in acute distress.     Appearance: He is normal weight. He is not ill-appearing or toxic-appearing.   HENT:      Head: Normocephalic and atraumatic.      Right Ear: External ear normal.      Left Ear: External ear normal.      Nose: Nose normal.      Mouth/Throat:      Mouth: Mucous membranes are moist.      Pharynx: Oropharynx is clear.   Eyes:      General: No scleral icterus.        Right eye: No discharge.         Left eye: No discharge.      Extraocular Movements: Extraocular movements intact.      Conjunctiva/sclera: Conjunctivae normal.      Pupils: Pupils are equal, round, and reactive to light.   Neck:      Vascular: Carotid bruit present.   Cardiovascular:      Rate and Rhythm: Normal rate.      Pulses: Normal pulses.      Heart sounds: Murmur heard.      Comments: III/VI systolic murmur  Pulmonary:      Effort: Pulmonary effort is normal.      Breath sounds: Normal breath sounds.   Abdominal:      General: Bowel sounds are normal. There is no distension.      Palpations: Abdomen is soft.      Tenderness: There is no abdominal tenderness. There is no guarding.   Musculoskeletal:      Cervical back: Normal range of motion and neck supple.      Right lower leg: No edema.      Left lower leg: No edema.   Skin:     General: Skin is warm and dry.      Coloration: Skin is not pale.      Findings: No erythema or rash.   Neurological:      General: No focal deficit present.      Mental Status: He is alert and oriented to person, place, and time.      Sensory: No sensory deficit.      Coordination: Coordination normal.   Psychiatric:         Mood and Affect: Mood normal.         Behavior: Behavior normal.         Thought Content: Thought content normal.         Judgment: Judgment normal.         Lab Results:               Invalid input(s): \"LABGLOM\"    " "  No results found for: \"HGBA1C\"  No results found for: \"CKTOTAL\", \"CKMB\", \"CKMBINDEX\", \"TROPONINI\"    Imaging Studies:     Echocardiogram:     Findings    Left Ventricle Left ventricular cavity size is normal. Wall thickness is normal. The left ventricular ejection fraction is 60%. Systolic function is normal. Wall motion is normal. Diastolic function is moderately abnormal, consistent with grade II (pseudonormal) relaxation.   Right Ventricle Right ventricular cavity size is normal. Systolic function is normal. Wall thickness is normal.   Left Atrium The atrium is normal in size.   Right Atrium The atrium is normal in size.   Aortic Valve The aortic valve is trileaflet. The leaflets are not thickened. The leaflets are severely calcified. There is severely reduced mobility. There is no evidence of regurgitation. There is severe stenosis.   Mitral Valve Mitral valve structure is normal. There is mild.  There is mild regurgitation. There is no evidence of stenosis.   Tricuspid Valve Tricuspid valve structure is normal. There is moderate regurgitation. There is no evidence of stenosis.   Pulmonic Valve Pulmonic valve structure is normal. There is trace regurgitation. There is no evidence of stenosis.   Ascending Aorta The aortic root is normal in size.   IVC/SVC The inferior vena cava is normal in size.   Pericardium There is no pericardial effusion. The pericardium is normal in appearance.       Gated CTA of the chest/abdomen/pelvis:   VASCULAR FINDINGS:     Central pulmonary artery is not abnormally enlarged.  Right atrium and right ventricle are normal in size.  Left atrial cavity is not abnormally dilated.  Left ventricular myocardium is normal.  Mild mitral annular calcification.     Coronary artery origins are in typical position.  Moderate coronary artery calcification.     Annulus, LVOT and aorta analysis:     Typical trileaflet aortic valve morphology.        Optimal CT aortic valve plane angles:  3 cusp " view: Belgian 15, cranial 0  Cusp overlap view CHANG 6, caudal 25     Measurements:     Annulus diameter (max x min): 25.5 x 20.5 mm.  Annulus Area 404.3 mm2.  Annulus Perimeter 72.1 mm.     Annulus to left main coronary ostium: 12.3 mm.  Annulus to right main coronary ostium: 16.1 mm.  Sinus of Valsalva height: 12.8 mm.     Aortic sinus of Valsalva diameter (max x min): 32.4 x 29.2 mm.  LVOT minimal diameter: 18.9 mm.     Sino-tubular junction minimal diameter: 27.2 mm.  Ascending thoracic aorta maximal diameter: 35.5 mm.     Valve Calcification:     Aortic valve calcium score is 1828.  Volume of 1069 mm3.     Thoracic Aorta:     Porcelain aorta = no.  Aortic root and ascending thoracic aorta free of significant calcification beyond the sino-tubular junction.  Aortic arch is normal in course and caliber with insignificant calcification.  Descending thoracic aorta is normal in course, caliber, and contour.     Abdominal aorta and pelvic artery measurements:     Abdominal aorta:  Minimal diameter above aortic bifurcation: 13.6 mm  Calcification: mild  Tortuosity: none     Right iliofemoral segment:  Minimal diameter: 6.6 mm  Calcification: mild  Tortuosity: mild     Left iliofemoral segment:  Minimal diameter: 6.2 mm  Calcification: mild  Tortuosity: mild     ADDITIONAL FINDINGS:     Chest:  No clinically significant findings in the lungs, pleura, mediastinum or chest wall.     Abdomen:  Hypodense lesions of liver likely represent cysts. No calcified gallstones. No pericholecystic inflammatory change.  No biliary dilatation.  No significant findings involving pancreas, spleen, adrenal glands, or kidneys.     Pelvis:  Prostate is surgically absent. No significant findings involving urinary bladder or pelvic cavity.     Abdominopelvic Cavity:  No ascites.  No pneumoperitoneum.  No lymphadenopathy.     Osseous Structures:  No acute fracture or destructive osseous lesion.        IMPRESSION:     Measurements and analysis to  allow for planning of Transcatheter Aortic Valve Repair as above.      Cardiac catheterization:   Left Main   Dist LM lesion is 20% stenosed. The lesion is calcified.      Left Anterior Descending   The vessel exhibits minimal luminal irregularities.      Left Circumflex   The vessel exhibits minimal luminal irregularities.   Ost Cx lesion is 30% stenosed.      Right Coronary Artery   There is mild diffuse disease throughout the vessel.   Prox RCA lesion is 30% stenosed.   Mid RCA lesion is 40% stenosed.   Dist RCA lesion is 30% stenosed.        Carotid artery ultrasound:   CONCLUSION:  Impression  RIGHT:  There is <50% stenosis noted in the internal carotid artery. Plaque is  heterogenous and irregular.  Vertebral artery flow is antegrade. There is no significant subclavian artery  disease.  LEFT:  There is <50% stenosis noted in the internal carotid artery. Plaque is  heterogenous and irregular. Proximal Common carotid artery was not visualized.  Vertebral artery flow is antegrade. There is no significant subclavian artery  disease.    I have personally reviewed pertinent reports.      TAVR evaluation Assessment:     5 Meter Walk Test:      Attempt 1: 5   Attempt 2: 6   Attempt 3: 6    STS Risk Score: 1.1 %, mortality risk    Aortic Stenosis Stage: D1    [facility]: III    K[facility]Q-12 was completed    Assessment:  Patient Active Problem List    Diagnosis Date Noted    Nonrheumatic aortic valve stenosis 03/13/2024    Coronary artery disease involving native coronary artery 03/13/2024    Aortic valve stenosis 03/04/2024    History of prostate cancer 03/04/2024    Mixed hyperlipidemia 04/13/2018    Benign essential hypertension 08/01/2017    Family history of ischemic heart disease (IHD) 08/01/2017     Severe aortic stenosis; Ongoing TAVR workup    Plan:    Song Camargo has severe symptomatic aortic stenosis. Based on their STS risk assessment, they have undergone testing for transcatheter aortic valve replacement.  The  "results of these studies have been interpreted by Jacob Macias D.O. who has determined the patient to be an appropriate candidate for transcatheter aortic valve replacement.    The risks, benefits, and alternatives to TAVR were discussed in detail with the patient today. They understand and wish to proceed with transfemoral transcatheter aortic valve replacement.  Informed consent was obtained and a date for the intervention has been set.    Song Sloantheamendoza was comfortable with our recommendations, and their questions were answered to their satisfaction.      Shared decision-making encounter occurred during this visit. Additionally, heart team evaluation of suitability for surgical replacement has been completed.     The following preoperative instructions were provided at the conclusion of their consultation:     1. You will receive a phone call from the hospital between 2:00 PM and 8:00 PM the day prior to surgery to confirm arrival time and location. For surgery on Mondays, you will receive a call on Friday  2. Do not drink or eat anything after midnight the night before surgery. That includes no water, candy, gum, lozenges, Lifesavers, etc. We recommend you not eat any \"junk\" food, consume alcohol or smoke the night before surgery.  3. Continue taking aspirin but only 81 mg daily.  4. If you take Coumadin discontinue it 5 days before surgery.  5. If you are diabetic, do not take any of your diabetic pills the morning of surgery. If you take Lantus insulin, you may take it at your regularly scheduled time the day before surgery. Do not take any other insulins the morning of surgery.  6. The 2 nights before surgery, take a shower each night using the special antiseptic soap or soap sponges you received from the office or hospital. Shampoo your hair with regular shampoo and rinse completely before using the antiseptic sponges. Use the sponge to wash from your neck down, with special attention to the armpits and " "groin area. Do not use any other soap or cleanser on your skin. Do not use lotions, powder, deodorant or perfume of any kind on your skin after you shower. Use clean bed linens and wear clean pajamas after your shower.  7. You will be prescribed Mupirocin nasal ointment. Apply to both nostrils twice a day for 5 days prior to surgery.  8. Do not take a shower the morning of her surgery; you'll be given a special\" bath\" at the hospital.  9. Notify the CT surgery office if you develop a cold, sore throat, cough, fever or other health issues before your surgery.  10. Other medication changes included the following: stop lisinopril 3 days prior to surgery     SIGNATURE: Jessy Wan PA-C  DATE: April 8, 2024  TIME: 2:45 PM    * This note was completed in part utilizing [employer] direct voice recognition software.   Grammatical errors, random word insertion, spelling mistakes, and incomplete sentences may be an occasional consequence of the system secondary to software limitations, ambient noise and hardware issues. At the time of dictation, efforts were made to edit, clarify and /or correct errors. Please read the chart carefully and recognize, using context, where substitutions have occurred.  If you have any questions or concerns about the context, text or information contained within the body of this dictation, please contact myself, the provider, for further clarification.   "

## 2024-04-08 NOTE — H&P (VIEW-ONLY)
Consultation - Cardiothoracic Surgery   Song Camargo 78 y.o. male MRN: [id number]      Reason for Consult / Principal Problem: Aortic stenosis, Non-Rheumatic    History of Present Illness: Song Camargo is a 78 y.o. year old male who was previously evaluated in our office by Jacob Macias D.O. for transcatheter aortic valve replacement.  During this initial consultation, arrangements were made for the following studies to be completed: gated CTA of the chest, abdomen, and pelvis, cardiac catheterization, and carotid ultrasound.     Song Camargo now presents to review the results of these tests and confirm the suitability of proceeding with transcatheter aortic valve replacment.    Of note, he has symptomatic aortic stenosis with complaints of lightheadedness and dizziness.  He lives alone but has 2 sons who live close by. He tolerates all of his ADLs independently.  He is a non smoker, quit in 2004.  He drinks one glass of wine a night and no illegal drug use. He has dental clearance.  He has completed his preoperative testing for TAVR.     Past Medical History:  Past Medical History:   Diagnosis Date    Arthritis     Basal cell carcinoma (BCC)     Coronary artery disease     High blood pressure     High cholesterol     Lumbar radiculopathy     Prostate CA (HCC)      Past Surgical History:   Past Surgical History:   Procedure Laterality Date    APPENDECTOMY      CARDIAC CATHETERIZATION N/A 3/13/2024    Procedure: Cardiac RHC/LHC;  Surgeon: Mir Sharpe MD;  Location: [facility];  Service: Cardiology    [place]COPY  2014    [facility] [place]  [place]    PROSTATECTOMY  2010    SHOULDER SURGERY Right     TONSILLECTOMY      VASECTOMY         Family History:  Family History   Problem Relation Age of Onset    Sudden death Mother         cardiac    Hypertension Father     Coronary artery disease Father     Cancer Father     Hyperlipidemia Father      Social History:    Social History     Substance and Sexual  Activity   Alcohol Use Yes    Alcohol/week: 8.0 standard drinks of alcohol    Types: 8 Glasses of wine per week    Comment: drinks one glass of wine on the weekdays and maybe two on the weekend     Social History     Substance and Sexual Activity   Drug Use Never     Social History     Tobacco Use   Smoking Status Former    Current packs/day: 0.00    Types: Cigarettes    Quit date: 2004    Years since quittin.0   Smokeless Tobacco Never       Home Medications:   Prior to Admission medications    Medication Sig Start Date End Date Taking? Authorizing Provider   amLODIPine (NORVASC) 2.5 mg tablet Take 2.5 mg by mouth in the morning. 3/12/22  Yes Historical Provider, MD   aspirin 81 mg chewable tablet Chew 1 tablet (81 mg total) daily 3/13/24  Yes NIR Delgado   atorvastatin (LIPITOR) 40 mg tablet Take 1 tablet (40 mg total) by mouth daily 3/13/24  Yes NIR Delgado   hydroCHLOROthiazide 12.5 mg tablet Take 1 tablet (12.5 mg total) by mouth daily Do not start before 2024. 3/14/24  Yes NIR Delgado   lisinopril (ZESTRIL) 20 mg tablet Take 1 tablet (20 mg total) by mouth daily Do not start before 2024. 3/14/24  Yes NIR Delgado       Allergies:  No Known Allergies    Review of Systems:     Review of Systems   Constitutional: Negative.    HENT: Negative.     Eyes: Negative.    Respiratory: Negative.     Cardiovascular: Negative.    Gastrointestinal: Negative.    Endocrine: Negative.    Genitourinary: Negative.    Musculoskeletal: Negative.    Skin: Negative.    Allergic/Immunologic: Negative.    Neurological:  Positive for dizziness and light-headedness.   Hematological: Negative.    Psychiatric/Behavioral: Negative.         Vital Signs:     Vitals:    24 1404 24 1407   BP: 112/59 120/58   BP Location: Left arm Right arm   Patient Position: Sitting Sitting   Cuff Size: Standard Standard   Pulse: 68    Temp: (!) 97.4 °F (36.3 °C)    TempSrc:  "Tympanic    SpO2: 99%    Weight: 82.6 kg (182 lb 3.2 oz)    Height: 5' 9\" (1.753 m)        Physical Exam:     Physical Exam  Constitutional:       General: He is not in acute distress.     Appearance: He is normal weight. He is not ill-appearing or toxic-appearing.   HENT:      Head: Normocephalic and atraumatic.      Right Ear: External ear normal.      Left Ear: External ear normal.      Nose: Nose normal.      Mouth/Throat:      Mouth: Mucous membranes are moist.      Pharynx: Oropharynx is clear.   Eyes:      General: No scleral icterus.        Right eye: No discharge.         Left eye: No discharge.      Extraocular Movements: Extraocular movements intact.      Conjunctiva/sclera: Conjunctivae normal.      Pupils: Pupils are equal, round, and reactive to light.   Neck:      Vascular: Carotid bruit present.   Cardiovascular:      Rate and Rhythm: Normal rate.      Pulses: Normal pulses.      Heart sounds: Murmur heard.      Comments: III/VI systolic murmur  Pulmonary:      Effort: Pulmonary effort is normal.      Breath sounds: Normal breath sounds.   Abdominal:      General: Bowel sounds are normal. There is no distension.      Palpations: Abdomen is soft.      Tenderness: There is no abdominal tenderness. There is no guarding.   Musculoskeletal:      Cervical back: Normal range of motion and neck supple.      Right lower leg: No edema.      Left lower leg: No edema.   Skin:     General: Skin is warm and dry.      Coloration: Skin is not pale.      Findings: No erythema or rash.   Neurological:      General: No focal deficit present.      Mental Status: He is alert and oriented to person, place, and time.      Sensory: No sensory deficit.      Coordination: Coordination normal.   Psychiatric:         Mood and Affect: Mood normal.         Behavior: Behavior normal.         Thought Content: Thought content normal.         Judgment: Judgment normal.         Lab Results:               Invalid input(s): \"LABGLOM\"    " "  No results found for: \"HGBA1C\"  No results found for: \"CKTOTAL\", \"CKMB\", \"CKMBINDEX\", \"TROPONINI\"    Imaging Studies:     Echocardiogram:     Findings    Left Ventricle Left ventricular cavity size is normal. Wall thickness is normal. The left ventricular ejection fraction is 60%. Systolic function is normal. Wall motion is normal. Diastolic function is moderately abnormal, consistent with grade II (pseudonormal) relaxation.   Right Ventricle Right ventricular cavity size is normal. Systolic function is normal. Wall thickness is normal.   Left Atrium The atrium is normal in size.   Right Atrium The atrium is normal in size.   Aortic Valve The aortic valve is trileaflet. The leaflets are not thickened. The leaflets are severely calcified. There is severely reduced mobility. There is no evidence of regurgitation. There is severe stenosis.   Mitral Valve Mitral valve structure is normal. There is mild.  There is mild regurgitation. There is no evidence of stenosis.   Tricuspid Valve Tricuspid valve structure is normal. There is moderate regurgitation. There is no evidence of stenosis.   Pulmonic Valve Pulmonic valve structure is normal. There is trace regurgitation. There is no evidence of stenosis.   Ascending Aorta The aortic root is normal in size.   IVC/SVC The inferior vena cava is normal in size.   Pericardium There is no pericardial effusion. The pericardium is normal in appearance.       Gated CTA of the chest/abdomen/pelvis:   VASCULAR FINDINGS:     Central pulmonary artery is not abnormally enlarged.  Right atrium and right ventricle are normal in size.  Left atrial cavity is not abnormally dilated.  Left ventricular myocardium is normal.  Mild mitral annular calcification.     Coronary artery origins are in typical position.  Moderate coronary artery calcification.     Annulus, LVOT and aorta analysis:     Typical trileaflet aortic valve morphology.        Optimal CT aortic valve plane angles:  3 cusp " view: [place] 15, cranial 0  Cusp overlap view CHANG 6, caudal 25     Measurements:     Annulus diameter (max x min): 25.5 x 20.5 mm.  Annulus Area 404.3 mm2.  Annulus Perimeter 72.1 mm.     Annulus to left main coronary ostium: 12.3 mm.  Annulus to right main coronary ostium: 16.1 mm.  Sinus of Valsalva height: 12.8 mm.     Aortic sinus of Valsalva diameter (max x min): 32.4 x 29.2 mm.  LVOT minimal diameter: 18.9 mm.     Sino-tubular junction minimal diameter: 27.2 mm.  Ascending thoracic aorta maximal diameter: 35.5 mm.     Valve Calcification:     Aortic valve calcium score is 1828.  Volume of 1069 mm3.     Thoracic Aorta:     Porcelain aorta = no.  Aortic root and ascending thoracic aorta free of significant calcification beyond the sino-tubular junction.  Aortic arch is normal in course and caliber with insignificant calcification.  Descending thoracic aorta is normal in course, caliber, and contour.     Abdominal aorta and pelvic artery measurements:     Abdominal aorta:  Minimal diameter above aortic bifurcation: 13.6 mm  Calcification: mild  Tortuosity: none     Right iliofemoral segment:  Minimal diameter: 6.6 mm  Calcification: mild  Tortuosity: mild     Left iliofemoral segment:  Minimal diameter: 6.2 mm  Calcification: mild  Tortuosity: mild     ADDITIONAL FINDINGS:     Chest:  No clinically significant findings in the lungs, pleura, mediastinum or chest wall.     Abdomen:  Hypodense lesions of liver likely represent cysts. No calcified gallstones. No pericholecystic inflammatory change.  No biliary dilatation.  No significant findings involving pancreas, spleen, adrenal glands, or kidneys.     Pelvis:  Prostate is surgically absent. No significant findings involving urinary bladder or pelvic cavity.     Abdominopelvic Cavity:  No ascites.  No pneumoperitoneum.  No lymphadenopathy.     Osseous Structures:  No acute fracture or destructive osseous lesion.        IMPRESSION:     Measurements and analysis to  allow for planning of Transcatheter Aortic Valve Repair as above.      Cardiac catheterization:   Left Main   Dist LM lesion is 20% stenosed. The lesion is calcified.      Left Anterior Descending   The vessel exhibits minimal luminal irregularities.      Left Circumflex   The vessel exhibits minimal luminal irregularities.   Ost Cx lesion is 30% stenosed.      Right Coronary Artery   There is mild diffuse disease throughout the vessel.   Prox RCA lesion is 30% stenosed.   Mid RCA lesion is 40% stenosed.   Dist RCA lesion is 30% stenosed.        Carotid artery ultrasound:   CONCLUSION:  Impression  RIGHT:  There is <50% stenosis noted in the internal carotid artery. Plaque is  heterogenous and irregular.  Vertebral artery flow is antegrade. There is no significant subclavian artery  disease.  LEFT:  There is <50% stenosis noted in the internal carotid artery. Plaque is  heterogenous and irregular. Proximal Common carotid artery was not visualized.  Vertebral artery flow is antegrade. There is no significant subclavian artery  disease.    I have personally reviewed pertinent reports.      TAVR evaluation Assessment:     5 Meter Walk Test:      Attempt 1: 5   Attempt 2: 6   Attempt 3: 6    STS Risk Score: 1.1 %, mortality risk    Aortic Stenosis Stage: D1    [facility]: III    KCCQ-12 was completed    Assessment:  Patient Active Problem List    Diagnosis Date Noted    Nonrheumatic aortic valve stenosis 03/13/2024    Coronary artery disease involving native coronary artery 03/13/2024    Aortic valve stenosis 03/04/2024    History of prostate cancer 03/04/2024    Mixed hyperlipidemia 04/13/2018    Benign essential hypertension 08/01/2017    Family history of ischemic heart disease (IHD) 08/01/2017     Severe aortic stenosis; Ongoing TAVR workup    Plan:    Song Camargo has severe symptomatic aortic stenosis. Based on their STS risk assessment, they have undergone testing for transcatheter aortic valve replacement.  The  "results of these studies have been interpreted by Jacob Macias D.O. who has determined the patient to be an appropriate candidate for transcatheter aortic valve replacement.    The risks, benefits, and alternatives to TAVR were discussed in detail with the patient today. They understand and wish to proceed with transfemoral transcatheter aortic valve replacement.  Informed consent was obtained and a date for the intervention has been set.    Song Sloantheamendoza was comfortable with our recommendations, and their questions were answered to their satisfaction.      Shared decision-making encounter occurred during this visit. Additionally, heart team evaluation of suitability for surgical replacement has been completed.     The following preoperative instructions were provided at the conclusion of their consultation:     1. You will receive a phone call from the hospital between 2:00 PM and 8:00 PM the day prior to surgery to confirm arrival time and location. For surgery on Mondays, you will receive a call on Friday  2. Do not drink or eat anything after midnight the night before surgery. That includes no water, candy, gum, lozenges, Lifesavers, etc. We recommend you not eat any \"junk\" food, consume alcohol or smoke the night before surgery.  3. Continue taking aspirin but only 81 mg daily.  4. If you take Coumadin discontinue it 5 days before surgery.  5. If you are diabetic, do not take any of your diabetic pills the morning of surgery. If you take Lantus insulin, you may take it at your regularly scheduled time the day before surgery. Do not take any other insulins the morning of surgery.  6. The 2 nights before surgery, take a shower each night using the special antiseptic soap or soap sponges you received from the office or hospital. Shampoo your hair with regular shampoo and rinse completely before using the antiseptic sponges. Use the sponge to wash from your neck down, with special attention to the armpits and " "groin area. Do not use any other soap or cleanser on your skin. Do not use lotions, powder, deodorant or perfume of any kind on your skin after you shower. Use clean bed linens and wear clean pajamas after your shower.  7. You will be prescribed Mupirocin nasal ointment. Apply to both nostrils twice a day for 5 days prior to surgery.  8. Do not take a shower the morning of her surgery; you'll be given a special\" bath\" at the hospital.  9. Notify the CT surgery office if you develop a cold, sore throat, cough, fever or other health issues before your surgery.  10. Other medication changes included the following: stop lisinopril 3 days prior to surgery     SIGNATURE: Jessy Wan PA-C  DATE: April 8, 2024  TIME: 2:45 PM    * This note was completed in part utilizing [employer] direct voice recognition software.   Grammatical errors, random word insertion, spelling mistakes, and incomplete sentences may be an occasional consequence of the system secondary to software limitations, ambient noise and hardware issues. At the time of dictation, efforts were made to edit, clarify and /or correct errors. Please read the chart carefully and recognize, using context, where substitutions have occurred.  If you have any questions or concerns about the context, text or information contained within the body of this dictation, please contact myself, the provider, for further clarification.     "

## 2024-04-08 NOTE — H&P
Consultation - Cardiothoracic Surgery   Song Camargo 78 y.o. male MRN: [id number]      Reason for Consult / Principal Problem: Aortic stenosis, Non-Rheumatic    History of Present Illness: Song Camargo is a 78 y.o. year old male who was previously evaluated in our office by Jacob Macias D.O. for transcatheter aortic valve replacement.  During this initial consultation, arrangements were made for the following studies to be completed: gated CTA of the chest, abdomen, and pelvis, cardiac catheterization, and carotid ultrasound.     Song Camargo now presents to review the results of these tests and confirm the suitability of proceeding with transcatheter aortic valve replacment.    Of note, he has symptomatic aortic stenosis with complaints of lightheadedness and dizziness.  He lives alone but has 2 sons who live close by. He tolerates all of his ADLs independently.  He is a non smoker, quit in 2004.  He drinks one glass of wine a night and no illegal drug use. He has dental clearance.  He has completed his preoperative testing for TAVR.     Past Medical History:  Past Medical History:   Diagnosis Date    Arthritis     Basal cell carcinoma (BCC)     Coronary artery disease     High blood pressure     High cholesterol     Lumbar radiculopathy     Prostate CA (HCC)      Past Surgical History:   Past Surgical History:   Procedure Laterality Date    APPENDECTOMY      CARDIAC CATHETERIZATION N/A 3/13/2024    Procedure: Cardiac RHC/LHC;  Surgeon: Mir Sharpe MD;  Location: [facility];  Service: Cardiology    COLONOSCOPY  2014    [facility]  [place]    PROSTATECTOMY  2010    SHOULDER SURGERY Right     TONSILLECTOMY      VASECTOMY         Family History:  Family History   Problem Relation Age of Onset    Sudden death Mother         cardiac    Hypertension Father     Coronary artery disease Father     Cancer Father     Hyperlipidemia Father      Social History:    Social History     Substance and Sexual  Activity   Alcohol Use Yes    Alcohol/week: 8.0 standard drinks of alcohol    Types: 8 Glasses of wine per week    Comment: drinks one glass of wine on the weekdays and maybe two on the weekend     Social History     Substance and Sexual Activity   Drug Use Never     Social History     Tobacco Use   Smoking Status Former    Current packs/day: 0.00    Types: Cigarettes    Quit date: 2004    Years since quittin.0   Smokeless Tobacco Never       Home Medications:   Prior to Admission medications    Medication Sig Start Date End Date Taking? Authorizing Provider   amLODIPine (NORVASC) 2.5 mg tablet Take 2.5 mg by mouth in the morning. 3/12/22  Yes Historical Provider, MD   aspirin 81 mg chewable tablet Chew 1 tablet (81 mg total) daily 3/13/24  Yes NIR Delgado   atorvastatin (LIPITOR) 40 mg tablet Take 1 tablet (40 mg total) by mouth daily 3/13/24  Yes NIR Delgado   hydroCHLOROthiazide 12.5 mg tablet Take 1 tablet (12.5 mg total) by mouth daily Do not start before 2024. 3/14/24  Yes NIR Delgado   lisinopril (ZESTRIL) 20 mg tablet Take 1 tablet (20 mg total) by mouth daily Do not start before 2024. 3/14/24  Yes NIR Delgado       Allergies:  No Known Allergies    Review of Systems:     Review of Systems   Constitutional: Negative.    HENT: Negative.     Eyes: Negative.    Respiratory: Negative.     Cardiovascular: Negative.    Gastrointestinal: Negative.    Endocrine: Negative.    Genitourinary: Negative.    Musculoskeletal: Negative.    Skin: Negative.    Allergic/Immunologic: Negative.    Neurological:  Positive for dizziness and light-headedness.   Hematological: Negative.    Psychiatric/Behavioral: Negative.         Vital Signs:     Vitals:    24 1404 24 1407   BP: 112/59 120/58   BP Location: Left arm Right arm   Patient Position: Sitting Sitting   Cuff Size: Standard Standard   Pulse: 68    Temp: (!) 97.4 °F (36.3 °C)    TempSrc:  "Tympanic    SpO2: 99%    Weight: 82.6 kg (182 lb 3.2 oz)    Height: 5' 9\" (1.753 m)        Physical Exam:     Physical Exam  Constitutional:       General: He is not in acute distress.     Appearance: He is normal weight. He is not ill-appearing or toxic-appearing.   HENT:      Head: Normocephalic and atraumatic.      Right Ear: External ear normal.      Left Ear: External ear normal.      Nose: Nose normal.      Mouth/Throat:      Mouth: Mucous membranes are moist.      Pharynx: Oropharynx is clear.   Eyes:      General: No scleral icterus.        Right eye: No discharge.         Left eye: No discharge.      Extraocular Movements: Extraocular movements intact.      Conjunctiva/sclera: Conjunctivae normal.      Pupils: Pupils are equal, round, and reactive to light.   Neck:      Vascular: Carotid bruit present.   Cardiovascular:      Rate and Rhythm: Normal rate.      Pulses: Normal pulses.      Heart sounds: Murmur heard.      Comments: III/VI systolic murmur  Pulmonary:      Effort: Pulmonary effort is normal.      Breath sounds: Normal breath sounds.   Abdominal:      General: Bowel sounds are normal. There is no distension.      Palpations: Abdomen is soft.      Tenderness: There is no abdominal tenderness. There is no guarding.   Musculoskeletal:      Cervical back: Normal range of motion and neck supple.      Right lower leg: No edema.      Left lower leg: No edema.   Skin:     General: Skin is warm and dry.      Coloration: Skin is not pale.      Findings: No erythema or rash.   Neurological:      General: No focal deficit present.      Mental Status: He is alert and oriented to person, place, and time.      Sensory: No sensory deficit.      Coordination: Coordination normal.   Psychiatric:         Mood and Affect: Mood normal.         Behavior: Behavior normal.         Thought Content: Thought content normal.         Judgment: Judgment normal.         Lab Results:               Invalid input(s): \"LABGLOM\"    " "  No results found for: \"HGBA1C\"  No results found for: \"CKTOTAL\", \"CKMB\", \"CKMBINDEX\", \"TROPONINI\"    Imaging Studies:     Echocardiogram:     Findings    Left Ventricle Left ventricular cavity size is normal. Wall thickness is normal. The left ventricular ejection fraction is 60%. Systolic function is normal. Wall motion is normal. Diastolic function is moderately abnormal, consistent with grade II (pseudonormal) relaxation.   Right Ventricle Right ventricular cavity size is normal. Systolic function is normal. Wall thickness is normal.   Left Atrium The atrium is normal in size.   Right Atrium The atrium is normal in size.   Aortic Valve The aortic valve is trileaflet. The leaflets are not thickened. The leaflets are severely calcified. There is severely reduced mobility. There is no evidence of regurgitation. There is severe stenosis.   Mitral Valve Mitral valve structure is normal. There is mild.  There is mild regurgitation. There is no evidence of stenosis.   Tricuspid Valve Tricuspid valve structure is normal. There is moderate regurgitation. There is no evidence of stenosis.   Pulmonic Valve Pulmonic valve structure is normal. There is trace regurgitation. There is no evidence of stenosis.   Ascending Aorta The aortic root is normal in size.   IVC/SVC The inferior vena cava is normal in size.   Pericardium There is no pericardial effusion. The pericardium is normal in appearance.       Gated CTA of the chest/abdomen/pelvis:   VASCULAR FINDINGS:     Central pulmonary artery is not abnormally enlarged.  Right atrium and right ventricle are normal in size.  Left atrial cavity is not abnormally dilated.  Left ventricular myocardium is normal.  Mild mitral annular calcification.     Coronary artery origins are in typical position.  Moderate coronary artery calcification.     Annulus, LVOT and aorta analysis:     Typical trileaflet aortic valve morphology.        Optimal CT aortic valve plane angles:  3 cusp " view: [place] 15, cranial 0  Cusp overlap view CHANG 6, caudal 25     Measurements:     Annulus diameter (max x min): 25.5 x 20.5 mm.  Annulus Area 404.3 mm2.  Annulus Perimeter 72.1 mm.     Annulus to left main coronary ostium: 12.3 mm.  Annulus to right main coronary ostium: 16.1 mm.  Sinus of Valsalva height: 12.8 mm.     Aortic sinus of Valsalva diameter (max x min): 32.4 x 29.2 mm.  LVOT minimal diameter: 18.9 mm.     Sino-tubular junction minimal diameter: 27.2 mm.  Ascending thoracic aorta maximal diameter: 35.5 mm.     Valve Calcification:     Aortic valve calcium score is 1828.  Volume of 1069 mm3.     Thoracic Aorta:     Porcelain aorta = no.  Aortic root and ascending thoracic aorta free of significant calcification beyond the sino-tubular junction.  Aortic arch is normal in course and caliber with insignificant calcification.  Descending thoracic aorta is normal in course, caliber, and contour.     Abdominal aorta and pelvic artery measurements:     Abdominal aorta:  Minimal diameter above aortic bifurcation: 13.6 mm  Calcification: mild  Tortuosity: none     Right iliofemoral segment:  Minimal diameter: 6.6 mm  Calcification: mild  Tortuosity: mild     Left iliofemoral segment:  Minimal diameter: 6.2 mm  Calcification: mild  Tortuosity: mild     ADDITIONAL FINDINGS:     Chest:  No clinically significant findings in the lungs, pleura, mediastinum or chest wall.     Abdomen:  Hypodense lesions of liver likely represent cysts. No calcified gallstones. No pericholecystic inflammatory change.  No biliary dilatation.  No significant findings involving pancreas, spleen, adrenal glands, or kidneys.     Pelvis:  Prostate is surgically absent. No significant findings involving urinary bladder or pelvic cavity.     Abdominopelvic Cavity:  No ascites.  No pneumoperitoneum.  No lymphadenopathy.     Osseous Structures:  No acute fracture or destructive osseous lesion.        IMPRESSION:     Measurements and analysis to  allow for planning of Transcatheter Aortic Valve Repair as above.      Cardiac catheterization:   Left Main   Dist LM lesion is 20% stenosed. The lesion is calcified.      Left Anterior Descending   The vessel exhibits minimal luminal irregularities.      Left Circumflex   The vessel exhibits minimal luminal irregularities.   Ost Cx lesion is 30% stenosed.      Right Coronary Artery   There is mild diffuse disease throughout the vessel.   Prox RCA lesion is 30% stenosed.   Mid RCA lesion is 40% stenosed.   Dist RCA lesion is 30% stenosed.        Carotid artery ultrasound:   CONCLUSION:  Impression  RIGHT:  There is <50% stenosis noted in the internal carotid artery. Plaque is  heterogenous and irregular.  Vertebral artery flow is antegrade. There is no significant subclavian artery  disease.  LEFT:  There is <50% stenosis noted in the internal carotid artery. Plaque is  heterogenous and irregular. Proximal Common carotid artery was not visualized.  Vertebral artery flow is antegrade. There is no significant subclavian artery  disease.    I have personally reviewed pertinent reports.      TAVR evaluation Assessment:     5 Meter Walk Test:      Attempt 1: 5   Attempt 2: 6   Attempt 3: 6    STS Risk Score: 1.1 %, mortality risk    Aortic Stenosis Stage: D1    [facility]: III    KCCQ-12 was completed    Assessment:  Patient Active Problem List    Diagnosis Date Noted    Nonrheumatic aortic valve stenosis 03/13/2024    Coronary artery disease involving native coronary artery 03/13/2024    Aortic valve stenosis 03/04/2024    History of prostate cancer 03/04/2024    Mixed hyperlipidemia 04/13/2018    Benign essential hypertension 08/01/2017    Family history of ischemic heart disease (IHD) 08/01/2017     Severe aortic stenosis; Ongoing TAVR workup    Plan:    Song Camargo has severe symptomatic aortic stenosis. Based on their STS risk assessment, they have undergone testing for transcatheter aortic valve replacement.  The  "results of these studies have been interpreted by Jacob Macias D.O. who has determined the patient to be an appropriate candidate for transcatheter aortic valve replacement.    The risks, benefits, and alternatives to TAVR were discussed in detail with the patient today. They understand and wish to proceed with transfemoral transcatheter aortic valve replacement.  Informed consent was obtained and a date for the intervention has been set.    Song Sloantheamendoza was comfortable with our recommendations, and their questions were answered to their satisfaction.      Shared decision-making encounter occurred during this visit. Additionally, heart team evaluation of suitability for surgical replacement has been completed.     The following preoperative instructions were provided at the conclusion of their consultation:     1. You will receive a phone call from the hospital between 2:00 PM and 8:00 PM the day prior to surgery to confirm arrival time and location. For surgery on Mondays, you will receive a call on Friday  2. Do not drink or eat anything after midnight the night before surgery. That includes no water, candy, gum, lozenges, Lifesavers, etc. We recommend you not eat any \"junk\" food, consume alcohol or smoke the night before surgery.  3. Continue taking aspirin but only 81 mg daily.  4. If you take Coumadin discontinue it 5 days before surgery.  5. If you are diabetic, do not take any of your diabetic pills the morning of surgery. If you take Lantus insulin, you may take it at your regularly scheduled time the day before surgery. Do not take any other insulins the morning of surgery.  6. The 2 nights before surgery, take a shower each night using the special antiseptic soap or soap sponges you received from the office or hospital. Shampoo your hair with regular shampoo and rinse completely before using the antiseptic sponges. Use the sponge to wash from your neck down, with special attention to the armpits and " "groin area. Do not use any other soap or cleanser on your skin. Do not use lotions, powder, deodorant or perfume of any kind on your skin after you shower. Use clean bed linens and wear clean pajamas after your shower.  7. You will be prescribed Mupirocin nasal ointment. Apply to both nostrils twice a day for 5 days prior to surgery.  8. Do not take a shower the morning of her surgery; you'll be given a special\" bath\" at the hospital.  9. Notify the CT surgery office if you develop a cold, sore throat, cough, fever or other health issues before your surgery.  10. Other medication changes included the following: stop lisinopril 3 days prior to surgery     SIGNATURE: Jessy Wan PA-C  DATE: April 8, 2024  TIME: 2:45 PM    * This note was completed in part utilizing [employer] direct voice recognition software.   Grammatical errors, random word insertion, spelling mistakes, and incomplete sentences may be an occasional consequence of the system secondary to software limitations, ambient noise and hardware issues. At the time of dictation, efforts were made to edit, clarify and /or correct errors. Please read the chart carefully and recognize, using context, where substitutions have occurred.  If you have any questions or concerns about the context, text or information contained within the body of this dictation, please contact myself, the provider, for further clarification.     "

## 2024-04-09 ENCOUNTER — LAB REQUISITION (OUTPATIENT)
Dept: LAB | Facility: HOSPITAL | Age: 79
End: 2024-04-09
Payer: COMMERCIAL

## 2024-04-09 ENCOUNTER — APPOINTMENT (OUTPATIENT)
Dept: LAB | Facility: CLINIC | Age: 79
End: 2024-04-09
Payer: COMMERCIAL

## 2024-04-09 DIAGNOSIS — I35.0 NONRHEUMATIC AORTIC (VALVE) STENOSIS: ICD-10-CM

## 2024-04-09 DIAGNOSIS — I35.0 NONRHEUMATIC AORTIC VALVE STENOSIS: ICD-10-CM

## 2024-04-09 LAB
ABO GROUP BLD: NORMAL
ALBUMIN SERPL BCP-MCNC: 4 G/DL (ref 3.5–5)
ALP SERPL-CCNC: 40 U/L (ref 34–104)
ALT SERPL W P-5'-P-CCNC: 24 U/L (ref 7–52)
ANION GAP SERPL CALCULATED.3IONS-SCNC: 8 MMOL/L (ref 4–13)
AST SERPL W P-5'-P-CCNC: 19 U/L (ref 13–39)
BASOPHILS # BLD AUTO: 0.05 THOUSANDS/ÂΜL (ref 0–0.1)
BASOPHILS NFR BLD AUTO: 1 % (ref 0–1)
BILIRUB SERPL-MCNC: 0.65 MG/DL (ref 0.2–1)
BLD GP AB SCN SERPL QL: NEGATIVE
BUN SERPL-MCNC: 17 MG/DL (ref 5–25)
CALCIUM SERPL-MCNC: 8.6 MG/DL (ref 8.4–10.2)
CHLORIDE SERPL-SCNC: 107 MMOL/L (ref 96–108)
CK SERPL-CCNC: 119 U/L (ref 39–308)
CO2 SERPL-SCNC: 28 MMOL/L (ref 21–32)
CREAT SERPL-MCNC: 0.93 MG/DL (ref 0.6–1.3)
EOSINOPHIL # BLD AUTO: 0.13 THOUSAND/ÂΜL (ref 0–0.61)
EOSINOPHIL NFR BLD AUTO: 2 % (ref 0–6)
ERYTHROCYTE [DISTWIDTH] IN BLOOD BY AUTOMATED COUNT: 12.9 % (ref 11.6–15.1)
GFR SERPL CREATININE-BSD FRML MDRD: 78 ML/MIN/1.73SQ M
GLUCOSE SERPL-MCNC: 100 MG/DL (ref 65–140)
HCT VFR BLD AUTO: 47.8 % (ref 36.5–49.3)
HGB BLD-MCNC: 15.8 G/DL (ref 12–17)
IMM GRANULOCYTES # BLD AUTO: 0.03 THOUSAND/UL (ref 0–0.2)
IMM GRANULOCYTES NFR BLD AUTO: 0 % (ref 0–2)
INR PPP: 1.05 (ref 0.84–1.19)
LYMPHOCYTES # BLD AUTO: 2.3 THOUSANDS/ÂΜL (ref 0.6–4.47)
LYMPHOCYTES NFR BLD AUTO: 28 % (ref 14–44)
MCH RBC QN AUTO: 31.9 PG (ref 26.8–34.3)
MCHC RBC AUTO-ENTMCNC: 33.1 G/DL (ref 31.4–37.4)
MCV RBC AUTO: 96 FL (ref 82–98)
MONOCYTES # BLD AUTO: 0.74 THOUSAND/ÂΜL (ref 0.17–1.22)
MONOCYTES NFR BLD AUTO: 9 % (ref 4–12)
NEUTROPHILS # BLD AUTO: 4.84 THOUSANDS/ÂΜL (ref 1.85–7.62)
NEUTS SEG NFR BLD AUTO: 60 % (ref 43–75)
NRBC BLD AUTO-RTO: 0 /100 WBCS
PLATELET # BLD AUTO: 269 THOUSANDS/UL (ref 149–390)
PMV BLD AUTO: 11 FL (ref 8.9–12.7)
POTASSIUM SERPL-SCNC: 4.3 MMOL/L (ref 3.5–5.3)
PROT SERPL-MCNC: 6.1 G/DL (ref 6.4–8.4)
PROTHROMBIN TIME: 13.6 SECONDS (ref 11.6–14.5)
RBC # BLD AUTO: 4.96 MILLION/UL (ref 3.88–5.62)
RH BLD: POSITIVE
SODIUM SERPL-SCNC: 143 MMOL/L (ref 135–147)
SPECIMEN EXPIRATION DATE: NORMAL
WBC # BLD AUTO: 8.09 THOUSAND/UL (ref 4.31–10.16)

## 2024-04-09 PROCEDURE — 36415 COLL VENOUS BLD VENIPUNCTURE: CPT

## 2024-04-09 PROCEDURE — 87081 CULTURE SCREEN ONLY: CPT

## 2024-04-09 PROCEDURE — 85610 PROTHROMBIN TIME: CPT

## 2024-04-09 PROCEDURE — 82550 ASSAY OF CK (CPK): CPT

## 2024-04-09 PROCEDURE — 86850 RBC ANTIBODY SCREEN: CPT | Performed by: PHYSICIAN ASSISTANT

## 2024-04-09 PROCEDURE — 86901 BLOOD TYPING SEROLOGIC RH(D): CPT | Performed by: PHYSICIAN ASSISTANT

## 2024-04-09 PROCEDURE — 80053 COMPREHEN METABOLIC PANEL: CPT

## 2024-04-09 PROCEDURE — 86900 BLOOD TYPING SEROLOGIC ABO: CPT | Performed by: PHYSICIAN ASSISTANT

## 2024-04-09 PROCEDURE — 85025 COMPLETE CBC W/AUTO DIFF WBC: CPT

## 2024-04-10 LAB — MRSA NOSE QL CULT: NORMAL

## 2024-04-15 ENCOUNTER — ANESTHESIA EVENT (INPATIENT)
Dept: PERIOP | Facility: HOSPITAL | Age: 79
DRG: 267 | End: 2024-04-15
Payer: MEDICARE

## 2024-04-15 ENCOUNTER — TELEPHONE (OUTPATIENT)
Age: 79
End: 2024-04-15

## 2024-04-15 PROBLEM — M54.16 LUMBAR RADICULOPATHY: Status: ACTIVE | Noted: 2024-04-15

## 2024-04-15 NOTE — ANESTHESIA PREPROCEDURE EVALUATION
Procedure:  REPLACEMENT AORTIC VALVE TRANSCATHETER (TAVR) TRANSFEMORAL W/ 23MM OLIVEIRA REGINE S3 UR VALVE(ACCESS ON LEFT) TEQUILA (Chest)  Cardiac tavr (Chest)    Relevant Problems   ANESTHESIA (within normal limits)      CARDIO   (+) Aortic valve stenosis   (+) Benign essential hypertension   (+) Coronary artery disease involving native coronary artery   (+) Mixed hyperlipidemia   (+) Nonrheumatic aortic valve stenosis      Behavioral Health   (+) Daily consumption of alcohol      Orthopedic/Musculoskeletal   (+) Lumbar radiculopathy      Findings    Left Ventricle Left ventricular cavity size is normal. Wall thickness is normal. The left ventricular ejection fraction is 60%. Systolic function is normal. Wall motion is normal. Diastolic function is moderately abnormal, consistent with grade II (pseudonormal) relaxation.   Right Ventricle Right ventricular cavity size is normal. Systolic function is normal. Wall thickness is normal.   Left Atrium The atrium is normal in size.   Right Atrium The atrium is normal in size.   Aortic Valve The aortic valve is trileaflet. The leaflets are not thickened. The leaflets are severely calcified. There is severely reduced mobility. There is no evidence of regurgitation. There is severe stenosis.   Mitral Valve Mitral valve structure is normal. There is mild.  There is mild regurgitation. There is no evidence of stenosis.   Tricuspid Valve Tricuspid valve structure is normal. There is moderate regurgitation. There is no evidence of stenosis.   Pulmonic Valve Pulmonic valve structure is normal. There is trace regurgitation. There is no evidence of stenosis.   Ascending Aorta The aortic root is normal in size.   IVC/SVC The inferior vena cava is normal in size.   Pericardium There is no pericardial effusion. The pericardium is normal in appearance.       Normal sinus rhythm  Left ventricular hypertrophy with repolarization abnormality  Abnormal ECG  No previous ECGs available      Prox  RCA lesion is 30% stenosed.    Mid RCA lesion is 40% stenosed.    Dist RCA lesion is 30% stenosed.    Dist LM lesion is 20% stenosed.    Ost Cx lesion is 30% stenosed.     No obstructive epicardial CAD.  Physical Exam    Airway    Mallampati score: III  TM Distance: >3 FB  Neck ROM: full     Dental   No notable dental hx     Cardiovascular  Rhythm: regular, Rate: normal, Murmur    Pulmonary  Pulmonary exam normal Breath sounds clear to auscultation    Other Findings        Anesthesia Plan  ASA Score- 4     Anesthesia Type- general with ASA Monitors.         Additional Monitors: arterial line and central venous line.    Airway Plan: ETT.    Comment: TEQUILA.       Plan Factors-Exercise tolerance (METS): <4 METS.    Chart reviewed. EKG reviewed.  Existing labs reviewed. Patient summary reviewed.    Patient is not a current smoker.              Induction- intravenous.    Postoperative Plan- Plan for postoperative opioid use. Planned trial extubation    Informed Consent- Anesthetic plan and risks discussed with patient.  I personally reviewed this patient with the CRNA. Discussed and agreed on the Anesthesia Plan with the CRNA..

## 2024-04-15 NOTE — TELEPHONE ENCOUNTER
Pt called to confirm that prior auth. was done for surgery tomorrow 4/16. He requested the auth. Form. I sent it to him through [employer]. Pt confirmed he received it.

## 2024-04-16 ENCOUNTER — APPOINTMENT (INPATIENT)
Dept: RADIOLOGY | Facility: HOSPITAL | Age: 79
DRG: 267 | End: 2024-04-16
Payer: MEDICARE

## 2024-04-16 ENCOUNTER — HOSPITAL ENCOUNTER (INPATIENT)
Facility: HOSPITAL | Age: 79
LOS: 1 days | Discharge: HOME | DRG: 267 | End: 2024-04-17
Attending: THORACIC SURGERY (CARDIOTHORACIC VASCULAR SURGERY) | Admitting: THORACIC SURGERY (CARDIOTHORACIC VASCULAR SURGERY)
Payer: MEDICARE

## 2024-04-16 ENCOUNTER — APPOINTMENT (OUTPATIENT)
Dept: NON INVASIVE DIAGNOSTICS | Facility: HOSPITAL | Age: 79
DRG: 267 | End: 2024-04-16
Attending: THORACIC SURGERY (CARDIOTHORACIC VASCULAR SURGERY)
Payer: MEDICARE

## 2024-04-16 ENCOUNTER — ANESTHESIA (INPATIENT)
Dept: PERIOP | Facility: HOSPITAL | Age: 79
DRG: 267 | End: 2024-04-16
Payer: MEDICARE

## 2024-04-16 DIAGNOSIS — Z95.2 S/P TAVR (TRANSCATHETER AORTIC VALVE REPLACEMENT): Primary | ICD-10-CM

## 2024-04-16 DIAGNOSIS — I35.0 NONRHEUMATIC AORTIC VALVE STENOSIS: Primary | ICD-10-CM

## 2024-04-16 DIAGNOSIS — Z95.2 S/P TAVR (TRANSCATHETER AORTIC VALVE REPLACEMENT): ICD-10-CM

## 2024-04-16 DIAGNOSIS — I35.0 NONRHEUMATIC AORTIC (VALVE) STENOSIS: ICD-10-CM

## 2024-04-16 DIAGNOSIS — I35.0 NONRHEUMATIC AORTIC VALVE STENOSIS: ICD-10-CM

## 2024-04-16 PROBLEM — Z78.9 DAILY CONSUMPTION OF ALCOHOL: Status: ACTIVE | Noted: 2024-04-16

## 2024-04-16 LAB
ANION GAP SERPL CALCULATED.3IONS-SCNC: 6 MMOL/L (ref 4–13)
BASE EXCESS BLDA CALC-SCNC: -1 MMOL/L (ref -2–3)
BASE EXCESS BLDA CALC-SCNC: -1 MMOL/L (ref -2–3)
BASE EXCESS BLDA CALC-SCNC: 1 MMOL/L (ref -2–3)
BUN SERPL-MCNC: 13 MG/DL (ref 5–25)
CA-I BLD-SCNC: 1.14 MMOL/L (ref 1.12–1.32)
CA-I BLD-SCNC: 1.15 MMOL/L (ref 1.12–1.32)
CA-I BLD-SCNC: 1.2 MMOL/L (ref 1.12–1.32)
CALCIUM SERPL-MCNC: 8.2 MG/DL (ref 8.4–10.2)
CHLORIDE SERPL-SCNC: 108 MMOL/L (ref 96–108)
CO2 SERPL-SCNC: 26 MMOL/L (ref 21–32)
CREAT SERPL-MCNC: 0.7 MG/DL (ref 0.6–1.3)
GFR SERPL CREATININE-BSD FRML MDRD: 91 ML/MIN/1.73SQ M
GLUCOSE SERPL-MCNC: 100 MG/DL (ref 65–140)
GLUCOSE SERPL-MCNC: 102 MG/DL (ref 65–140)
GLUCOSE SERPL-MCNC: 104 MG/DL (ref 65–140)
GLUCOSE SERPL-MCNC: 105 MG/DL (ref 65–140)
GLUCOSE SERPL-MCNC: 106 MG/DL (ref 65–140)
GLUCOSE SERPL-MCNC: 111 MG/DL (ref 65–140)
GLUCOSE SERPL-MCNC: 138 MG/DL (ref 65–140)
GLUCOSE SERPL-MCNC: 168 MG/DL (ref 65–140)
HCO3 BLDA-SCNC: 22.8 MMOL/L (ref 22–28)
HCO3 BLDA-SCNC: 22.9 MMOL/L (ref 22–28)
HCO3 BLDA-SCNC: 27.5 MMOL/L (ref 24–30)
HCT VFR BLD AUTO: 41.7 % (ref 36.5–49.3)
HCT VFR BLD CALC: 33 % (ref 36.5–49.3)
HCT VFR BLD CALC: 35 % (ref 36.5–49.3)
HCT VFR BLD CALC: 39 % (ref 36.5–49.3)
HGB BLD-MCNC: 14.4 G/DL (ref 12–17)
HGB BLDA-MCNC: 11.2 G/DL (ref 12–17)
HGB BLDA-MCNC: 11.9 G/DL (ref 12–17)
HGB BLDA-MCNC: 13.3 G/DL (ref 12–17)
KCT BLD-ACNC: 115 SEC (ref 89–137)
KCT BLD-ACNC: 126 SEC (ref 89–137)
KCT BLD-ACNC: 234 SEC (ref 89–137)
PCO2 BLD: 24 MMOL/L (ref 21–32)
PCO2 BLD: 24 MMOL/L (ref 21–32)
PCO2 BLD: 29 MMOL/L (ref 21–32)
PCO2 BLD: 34.6 MM HG (ref 36–44)
PCO2 BLD: 35.5 MM HG (ref 36–44)
PCO2 BLD: 50.2 MM HG (ref 42–50)
PH BLD: 7.35 [PH] (ref 7.3–7.4)
PH BLD: 7.42 [PH] (ref 7.35–7.45)
PH BLD: 7.43 [PH] (ref 7.35–7.45)
PLATELET # BLD AUTO: 187 THOUSANDS/UL (ref 149–390)
PLATELET # BLD AUTO: 200 THOUSANDS/UL (ref 149–390)
PMV BLD AUTO: 10.6 FL (ref 8.9–12.7)
PMV BLD AUTO: 10.7 FL (ref 8.9–12.7)
PO2 BLD: 125 MM HG (ref 75–129)
PO2 BLD: 293 MM HG (ref 75–129)
PO2 BLD: 57 MM HG (ref 35–45)
POTASSIUM BLD-SCNC: 3.6 MMOL/L (ref 3.5–5.3)
POTASSIUM BLD-SCNC: 3.6 MMOL/L (ref 3.5–5.3)
POTASSIUM BLD-SCNC: 3.8 MMOL/L (ref 3.5–5.3)
POTASSIUM SERPL-SCNC: 3.7 MMOL/L (ref 3.5–5.3)
SAO2 % BLD FROM PO2: 100 % (ref 60–85)
SAO2 % BLD FROM PO2: 87 % (ref 60–85)
SAO2 % BLD FROM PO2: 99 % (ref 60–85)
SODIUM BLD-SCNC: 141 MMOL/L (ref 136–145)
SODIUM BLD-SCNC: 142 MMOL/L (ref 136–145)
SODIUM BLD-SCNC: 143 MMOL/L (ref 136–145)
SODIUM SERPL-SCNC: 140 MMOL/L (ref 135–147)
SPECIMEN SOURCE: ABNORMAL
SPECIMEN SOURCE: NORMAL
SPECIMEN SOURCE: NORMAL

## 2024-04-16 PROCEDURE — 82330 ASSAY OF CALCIUM: CPT

## 2024-04-16 PROCEDURE — 86920 COMPATIBILITY TEST SPIN: CPT

## 2024-04-16 PROCEDURE — 76377 3D RENDER W/INTRP POSTPROCES: CPT

## 2024-04-16 PROCEDURE — C1781 MESH (IMPLANTABLE): HCPCS | Performed by: THORACIC SURGERY (CARDIOTHORACIC VASCULAR SURGERY)

## 2024-04-16 PROCEDURE — 84295 ASSAY OF SERUM SODIUM: CPT

## 2024-04-16 PROCEDURE — C1894 INTRO/SHEATH, NON-LASER: HCPCS | Performed by: THORACIC SURGERY (CARDIOTHORACIC VASCULAR SURGERY)

## 2024-04-16 PROCEDURE — 85014 HEMATOCRIT: CPT | Performed by: PHYSICIAN ASSISTANT

## 2024-04-16 PROCEDURE — 85049 AUTOMATED PLATELET COUNT: CPT | Performed by: PHYSICIAN ASSISTANT

## 2024-04-16 PROCEDURE — 93005 ELECTROCARDIOGRAM TRACING: CPT

## 2024-04-16 PROCEDURE — 85014 HEMATOCRIT: CPT

## 2024-04-16 PROCEDURE — C1769 GUIDE WIRE: HCPCS | Performed by: THORACIC SURGERY (CARDIOTHORACIC VASCULAR SURGERY)

## 2024-04-16 PROCEDURE — 93355 ECHO TRANSESOPHAGEAL (TEE): CPT

## 2024-04-16 PROCEDURE — 82948 REAGENT STRIP/BLOOD GLUCOSE: CPT

## 2024-04-16 PROCEDURE — 80048 BASIC METABOLIC PNL TOTAL CA: CPT | Performed by: PHYSICIAN ASSISTANT

## 2024-04-16 PROCEDURE — C1760 CLOSURE DEV, VASC: HCPCS | Performed by: THORACIC SURGERY (CARDIOTHORACIC VASCULAR SURGERY)

## 2024-04-16 PROCEDURE — 82803 BLOOD GASES ANY COMBINATION: CPT

## 2024-04-16 PROCEDURE — 33361 REPLACE AORTIC VALVE PERQ: CPT | Performed by: INTERNAL MEDICINE

## 2024-04-16 PROCEDURE — C1751 CATH, INF, PER/CENT/MIDLINE: HCPCS | Performed by: THORACIC SURGERY (CARDIOTHORACIC VASCULAR SURGERY)

## 2024-04-16 PROCEDURE — 94760 N-INVAS EAR/PLS OXIMETRY 1: CPT

## 2024-04-16 PROCEDURE — 02RF38Z REPLACEMENT OF AORTIC VALVE WITH ZOOPLASTIC TISSUE, PERCUTANEOUS APPROACH: ICD-10-PCS | Performed by: THORACIC SURGERY (CARDIOTHORACIC VASCULAR SURGERY)

## 2024-04-16 PROCEDURE — 84132 ASSAY OF SERUM POTASSIUM: CPT

## 2024-04-16 PROCEDURE — 02HV33Z INSERTION OF INFUSION DEVICE INTO SUPERIOR VENA CAVA, PERCUTANEOUS APPROACH: ICD-10-PCS | Performed by: ANESTHESIOLOGY

## 2024-04-16 PROCEDURE — 85018 HEMOGLOBIN: CPT | Performed by: PHYSICIAN ASSISTANT

## 2024-04-16 PROCEDURE — 85347 COAGULATION TIME ACTIVATED: CPT

## 2024-04-16 PROCEDURE — 33361 REPLACE AORTIC VALVE PERQ: CPT | Performed by: THORACIC SURGERY (CARDIOTHORACIC VASCULAR SURGERY)

## 2024-04-16 PROCEDURE — 82947 ASSAY GLUCOSE BLOOD QUANT: CPT

## 2024-04-16 PROCEDURE — 71045 X-RAY EXAM CHEST 1 VIEW: CPT

## 2024-04-16 DEVICE — SAPIEN 3 ULTRA RESILIA TRANSCATHETER HEART VALVE, 23MM
Type: IMPLANTABLE DEVICE | Site: AORTA | Status: FUNCTIONAL
Brand: SAPIEN 3 ULTRA RESILIA

## 2024-04-16 DEVICE — PERCLOSE™ PROSTYLE™ SUTURE-MEDIATED CLOSURE AND REPAIR SYSTEM
Type: IMPLANTABLE DEVICE | Site: ARTERIAL | Status: FUNCTIONAL
Brand: PERCLOSE™ PROSTYLE™

## 2024-04-16 RX ORDER — HEPARIN SODIUM 1000 [USP'U]/ML
400 INJECTION, SOLUTION INTRAVENOUS; SUBCUTANEOUS ONCE
Status: COMPLETED | OUTPATIENT
Start: 2024-04-16 | End: 2024-04-16

## 2024-04-16 RX ORDER — LIDOCAINE HYDROCHLORIDE 10 MG/ML
INJECTION, SOLUTION INFILTRATION; PERINEURAL AS NEEDED
Status: DISCONTINUED | OUTPATIENT
Start: 2024-04-16 | End: 2024-04-16

## 2024-04-16 RX ORDER — BISACODYL 10 MG
10 SUPPOSITORY, RECTAL RECTAL DAILY PRN
Status: DISCONTINUED | OUTPATIENT
Start: 2024-04-16 | End: 2024-04-17 | Stop reason: HOSPADM

## 2024-04-16 RX ORDER — PROMETHAZINE HYDROCHLORIDE 25 MG/ML
12.5 INJECTION, SOLUTION INTRAMUSCULAR; INTRAVENOUS ONCE AS NEEDED
Status: DISCONTINUED | OUTPATIENT
Start: 2024-04-16 | End: 2024-04-16 | Stop reason: HOSPADM

## 2024-04-16 RX ORDER — HYDROCHLOROTHIAZIDE 12.5 MG/1
12.5 TABLET ORAL DAILY
Status: DISCONTINUED | OUTPATIENT
Start: 2024-04-16 | End: 2024-04-17 | Stop reason: HOSPADM

## 2024-04-16 RX ORDER — ATORVASTATIN CALCIUM 40 MG/1
40 TABLET, FILM COATED ORAL DAILY
Status: DISCONTINUED | OUTPATIENT
Start: 2024-04-16 | End: 2024-04-17 | Stop reason: HOSPADM

## 2024-04-16 RX ORDER — ONDANSETRON 2 MG/ML
4 INJECTION INTRAMUSCULAR; INTRAVENOUS ONCE AS NEEDED
Status: DISCONTINUED | OUTPATIENT
Start: 2024-04-16 | End: 2024-04-16 | Stop reason: HOSPADM

## 2024-04-16 RX ORDER — ACETAMINOPHEN 325 MG/1
650 TABLET ORAL EVERY 4 HOURS PRN
Status: DISCONTINUED | OUTPATIENT
Start: 2024-04-16 | End: 2024-04-17 | Stop reason: HOSPADM

## 2024-04-16 RX ORDER — ONDANSETRON 2 MG/ML
4 INJECTION INTRAMUSCULAR; INTRAVENOUS EVERY 6 HOURS PRN
Status: DISCONTINUED | OUTPATIENT
Start: 2024-04-16 | End: 2024-04-17 | Stop reason: HOSPADM

## 2024-04-16 RX ORDER — PHENYLEPHRINE HCL IN 0.9% NACL 1 MG/10 ML
200-2000 SYRINGE (ML) INTRAVENOUS ONCE
Status: DISCONTINUED | OUTPATIENT
Start: 2024-04-16 | End: 2024-04-16 | Stop reason: HOSPADM

## 2024-04-16 RX ORDER — PROPOFOL 10 MG/ML
INJECTION, EMULSION INTRAVENOUS AS NEEDED
Status: DISCONTINUED | OUTPATIENT
Start: 2024-04-16 | End: 2024-04-16

## 2024-04-16 RX ORDER — SODIUM CHLORIDE 9 MG/ML
INJECTION, SOLUTION INTRAVENOUS CONTINUOUS PRN
Status: DISCONTINUED | OUTPATIENT
Start: 2024-04-16 | End: 2024-04-16

## 2024-04-16 RX ORDER — CHLORHEXIDINE GLUCONATE ORAL RINSE 1.2 MG/ML
15 SOLUTION DENTAL 2 TIMES DAILY
Status: DISCONTINUED | OUTPATIENT
Start: 2024-04-16 | End: 2024-04-17 | Stop reason: HOSPADM

## 2024-04-16 RX ORDER — LISINOPRIL 20 MG/1
20 TABLET ORAL DAILY
Status: DISCONTINUED | OUTPATIENT
Start: 2024-04-16 | End: 2024-04-17 | Stop reason: HOSPADM

## 2024-04-16 RX ORDER — ACETAMINOPHEN 650 MG/1
650 SUPPOSITORY RECTAL EVERY 4 HOURS PRN
Status: DISCONTINUED | OUTPATIENT
Start: 2024-04-16 | End: 2024-04-17 | Stop reason: HOSPADM

## 2024-04-16 RX ORDER — POTASSIUM CHLORIDE 20 MEQ/1
40 TABLET, EXTENDED RELEASE ORAL ONCE
Qty: 2 TABLET | Refills: 0 | Status: COMPLETED | OUTPATIENT
Start: 2024-04-16 | End: 2024-04-16

## 2024-04-16 RX ORDER — CHLORHEXIDINE GLUCONATE ORAL RINSE 1.2 MG/ML
15 SOLUTION DENTAL ONCE
Status: COMPLETED | OUTPATIENT
Start: 2024-04-16 | End: 2024-04-16

## 2024-04-16 RX ORDER — HYDROMORPHONE HCL IN WATER/PF 6 MG/30 ML
0.2 PATIENT CONTROLLED ANALGESIA SYRINGE INTRAVENOUS
Status: DISCONTINUED | OUTPATIENT
Start: 2024-04-16 | End: 2024-04-16 | Stop reason: HOSPADM

## 2024-04-16 RX ORDER — AMLODIPINE BESYLATE 2.5 MG/1
2.5 TABLET ORAL DAILY
Status: DISCONTINUED | OUTPATIENT
Start: 2024-04-16 | End: 2024-04-17 | Stop reason: HOSPADM

## 2024-04-16 RX ORDER — HYDRALAZINE HYDROCHLORIDE 20 MG/ML
10 INJECTION INTRAMUSCULAR; INTRAVENOUS EVERY 6 HOURS PRN
Status: DISCONTINUED | OUTPATIENT
Start: 2024-04-16 | End: 2024-04-17 | Stop reason: HOSPADM

## 2024-04-16 RX ORDER — METOCLOPRAMIDE HYDROCHLORIDE 5 MG/ML
10 INJECTION INTRAMUSCULAR; INTRAVENOUS ONCE AS NEEDED
Status: DISCONTINUED | OUTPATIENT
Start: 2024-04-16 | End: 2024-04-16 | Stop reason: HOSPADM

## 2024-04-16 RX ORDER — CEFAZOLIN SODIUM 2 G/50ML
2000 SOLUTION INTRAVENOUS EVERY 8 HOURS
Qty: 150 ML | Refills: 0 | Status: COMPLETED | OUTPATIENT
Start: 2024-04-16 | End: 2024-04-17

## 2024-04-16 RX ORDER — CEFAZOLIN SODIUM 2 G/50ML
2000 SOLUTION INTRAVENOUS ONCE
Status: COMPLETED | OUTPATIENT
Start: 2024-04-16 | End: 2024-04-16

## 2024-04-16 RX ORDER — SODIUM CHLORIDE, SODIUM GLUCONATE, SODIUM ACETATE, POTASSIUM CHLORIDE, MAGNESIUM CHLORIDE, SODIUM PHOSPHATE, DIBASIC, AND POTASSIUM PHOSPHATE .53; .5; .37; .037; .03; .012; .00082 G/100ML; G/100ML; G/100ML; G/100ML; G/100ML; G/100ML; G/100ML
INJECTION, SOLUTION INTRAVENOUS AS NEEDED
Status: DISCONTINUED | OUTPATIENT
Start: 2024-04-16 | End: 2024-04-16

## 2024-04-16 RX ORDER — POLYETHYLENE GLYCOL 3350 17 G/17G
17 POWDER, FOR SOLUTION ORAL DAILY
Status: DISCONTINUED | OUTPATIENT
Start: 2024-04-16 | End: 2024-04-17 | Stop reason: HOSPADM

## 2024-04-16 RX ORDER — HEPARIN SODIUM 5000 [USP'U]/ML
5000 INJECTION, SOLUTION INTRAVENOUS; SUBCUTANEOUS EVERY 8 HOURS SCHEDULED
Status: DISCONTINUED | OUTPATIENT
Start: 2024-04-17 | End: 2024-04-17 | Stop reason: HOSPADM

## 2024-04-16 RX ORDER — FENTANYL CITRATE 50 UG/ML
INJECTION, SOLUTION INTRAMUSCULAR; INTRAVENOUS AS NEEDED
Status: DISCONTINUED | OUTPATIENT
Start: 2024-04-16 | End: 2024-04-16

## 2024-04-16 RX ORDER — PROTAMINE SULFATE 10 MG/ML
INJECTION, SOLUTION INTRAVENOUS AS NEEDED
Status: DISCONTINUED | OUTPATIENT
Start: 2024-04-16 | End: 2024-04-16

## 2024-04-16 RX ORDER — HYDRALAZINE HYDROCHLORIDE 20 MG/ML
5 INJECTION INTRAMUSCULAR; INTRAVENOUS
Status: DISCONTINUED | OUTPATIENT
Start: 2024-04-16 | End: 2024-04-16 | Stop reason: HOSPADM

## 2024-04-16 RX ORDER — ALBUTEROL SULFATE 2.5 MG/3ML
2.5 SOLUTION RESPIRATORY (INHALATION) ONCE AS NEEDED
Status: DISCONTINUED | OUTPATIENT
Start: 2024-04-16 | End: 2024-04-16 | Stop reason: HOSPADM

## 2024-04-16 RX ORDER — INSULIN LISPRO 100 [IU]/ML
1-6 INJECTION, SOLUTION INTRAVENOUS; SUBCUTANEOUS
Status: DISCONTINUED | OUTPATIENT
Start: 2024-04-16 | End: 2024-04-17 | Stop reason: HOSPADM

## 2024-04-16 RX ORDER — ASPIRIN 81 MG/1
81 TABLET, CHEWABLE ORAL DAILY
Status: DISCONTINUED | OUTPATIENT
Start: 2024-04-16 | End: 2024-04-17 | Stop reason: HOSPADM

## 2024-04-16 RX ORDER — ROCURONIUM BROMIDE 10 MG/ML
INJECTION, SOLUTION INTRAVENOUS AS NEEDED
Status: DISCONTINUED | OUTPATIENT
Start: 2024-04-16 | End: 2024-04-16

## 2024-04-16 RX ORDER — HYDROMORPHONE HCL/PF 1 MG/ML
0.5 SYRINGE (ML) INJECTION
Status: DISCONTINUED | OUTPATIENT
Start: 2024-04-16 | End: 2024-04-16 | Stop reason: HOSPADM

## 2024-04-16 RX ORDER — LABETALOL HYDROCHLORIDE 5 MG/ML
10 INJECTION, SOLUTION INTRAVENOUS
Status: DISCONTINUED | OUTPATIENT
Start: 2024-04-16 | End: 2024-04-16 | Stop reason: HOSPADM

## 2024-04-16 RX ORDER — CLOPIDOGREL BISULFATE 75 MG/1
75 TABLET ORAL DAILY
Status: DISCONTINUED | OUTPATIENT
Start: 2024-04-16 | End: 2024-04-17 | Stop reason: HOSPADM

## 2024-04-16 RX ORDER — FENTANYL CITRATE/PF 50 MCG/ML
25 SYRINGE (ML) INJECTION
Status: DISCONTINUED | OUTPATIENT
Start: 2024-04-16 | End: 2024-04-16 | Stop reason: HOSPADM

## 2024-04-16 RX ORDER — PANTOPRAZOLE SODIUM 40 MG/1
40 TABLET, DELAYED RELEASE ORAL DAILY
Status: DISCONTINUED | OUTPATIENT
Start: 2024-04-16 | End: 2024-04-17 | Stop reason: HOSPADM

## 2024-04-16 RX ORDER — MAGNESIUM HYDROXIDE 1200 MG/15ML
LIQUID ORAL AS NEEDED
Status: DISCONTINUED | OUTPATIENT
Start: 2024-04-16 | End: 2024-04-16 | Stop reason: HOSPADM

## 2024-04-16 RX ADMIN — MUPIROCIN 1 APPLICATION: 20 OINTMENT TOPICAL at 08:55

## 2024-04-16 RX ADMIN — CEFAZOLIN SODIUM 2000 MG: 2 SOLUTION INTRAVENOUS at 20:28

## 2024-04-16 RX ADMIN — POTASSIUM CHLORIDE 40 MEQ: 1500 TABLET, EXTENDED RELEASE ORAL at 15:45

## 2024-04-16 RX ADMIN — SUGAMMADEX 200 MG: 100 INJECTION, SOLUTION INTRAVENOUS at 12:09

## 2024-04-16 RX ADMIN — ROCURONIUM BROMIDE 20 MG: 10 INJECTION, SOLUTION INTRAVENOUS at 11:35

## 2024-04-16 RX ADMIN — CLOPIDOGREL BISULFATE 75 MG: 75 TABLET ORAL at 15:45

## 2024-04-16 RX ADMIN — AMLODIPINE BESYLATE 2.5 MG: 2.5 TABLET ORAL at 15:45

## 2024-04-16 RX ADMIN — PROPOFOL 100 MG: 10 INJECTION, EMULSION INTRAVENOUS at 11:12

## 2024-04-16 RX ADMIN — PANTOPRAZOLE SODIUM 40 MG: 40 TABLET, DELAYED RELEASE ORAL at 15:45

## 2024-04-16 RX ADMIN — Medication 1 EACH: at 10:56

## 2024-04-16 RX ADMIN — ASPIRIN 81 MG CHEWABLE TABLET 81 MG: 81 TABLET CHEWABLE at 15:45

## 2024-04-16 RX ADMIN — PROTAMINE SULFATE 70 MG: 10 INJECTION, SOLUTION INTRAVENOUS at 12:09

## 2024-04-16 RX ADMIN — CEFAZOLIN SODIUM 2000 MG: 2 SOLUTION INTRAVENOUS at 11:30

## 2024-04-16 RX ADMIN — FENTANYL CITRATE 100 MCG: 50 INJECTION INTRAMUSCULAR; INTRAVENOUS at 11:12

## 2024-04-16 RX ADMIN — SODIUM CHLORIDE: 0.9 INJECTION, SOLUTION INTRAVENOUS at 10:57

## 2024-04-16 RX ADMIN — LISINOPRIL 20 MG: 20 TABLET ORAL at 15:45

## 2024-04-16 RX ADMIN — HEPARIN SODIUM 11000 UNITS: 1000 INJECTION INTRAVENOUS; SUBCUTANEOUS at 12:00

## 2024-04-16 RX ADMIN — SODIUM CHLORIDE, SODIUM GLUCONATE, SODIUM ACETATE, POTASSIUM CHLORIDE, MAGNESIUM CHLORIDE, SODIUM PHOSPHATE, DIBASIC, AND POTASSIUM PHOSPHATE 500 ML: .53; .5; .37; .037; .03; .012; .00082 INJECTION, SOLUTION INTRAVENOUS at 10:56

## 2024-04-16 RX ADMIN — HYDROCHLOROTHIAZIDE 12.5 MG: 12.5 TABLET ORAL at 15:45

## 2024-04-16 RX ADMIN — NICARDIPINE HYDROCHLORIDE 5 MG/HR: 2.5 INJECTION, SOLUTION INTRAVENOUS at 12:20

## 2024-04-16 RX ADMIN — PHENYLEPHRINE HYDROCHLORIDE 40 MCG/MIN: 10 INJECTION INTRAVENOUS at 11:31

## 2024-04-16 RX ADMIN — ATORVASTATIN CALCIUM 40 MG: 40 TABLET, FILM COATED ORAL at 15:45

## 2024-04-16 RX ADMIN — MUPIROCIN 1 APPLICATION: 20 OINTMENT TOPICAL at 21:45

## 2024-04-16 RX ADMIN — INSULIN LISPRO 1 UNITS: 100 INJECTION, SOLUTION INTRAVENOUS; SUBCUTANEOUS at 21:45

## 2024-04-16 RX ADMIN — ROCURONIUM BROMIDE 50 MG: 10 INJECTION, SOLUTION INTRAVENOUS at 11:12

## 2024-04-16 RX ADMIN — CHLORHEXIDINE GLUCONATE 15 ML: 1.2 SOLUTION ORAL at 18:37

## 2024-04-16 RX ADMIN — LIDOCAINE HYDROCHLORIDE 100 MG: 10 INJECTION, SOLUTION INFILTRATION; PERINEURAL at 11:12

## 2024-04-16 RX ADMIN — CHLORHEXIDINE GLUCONATE 15 ML: 1.2 SOLUTION ORAL at 08:55

## 2024-04-16 NOTE — ANESTHESIA PROCEDURE NOTES
"Central Line Insertion    Performed by: Juan Meyres CRNA  Authorized by: Treasure Banks MD    Date/Time: 4/16/2024 11:31 AM  Catheter Type:  triple lumen  Consent: Verbal consent obtained. Written consent obtained.  Risks and benefits: risks, benefits and alternatives were discussed  Consent given by: patient  Patient understanding: patient states understanding of the procedure being performed  Patient consent: the patient's understanding of the procedure matches consent given  Required items: required blood products, implants, devices, and special equipment available  Patient identity confirmed: arm band, provided demographic data and hospital-assigned identification number  Time out: Immediately prior to procedure a \"time out\" was called to verify the correct patient, procedure, equipment, support staff and site/side marked as required.  Indications: vascular access and central pressure monitoring  Catheter size: 7 Fr  Patient position: Trendelenburg  Assessment: blood return through all ports and free fluid flow  Preparation: skin prepped with 2% chlorhexidine  Skin prep agent dried: skin prep agent completely dried prior to procedure  Sterile barriers: all five maximum sterile barriers used - cap, mask, sterile gown, sterile gloves, and large sterile sheet  Hand hygiene: hand hygiene performed prior to central venous catheter insertion  sterile gel and probe cover used in ultrasound-guided central venous catheter insertionultrasound permanent image saved  Vessel of Catheter Tip End: svc  Number of attempts: 1  Successful placement: yes  Post-procedure: line sutured, dressing applied and chlorhexidine patch applied  Patient tolerance: Patient tolerated the procedure well with no immediate complications and patient tolerated the procedure well with no immediate complications        "

## 2024-04-16 NOTE — ANESTHESIA PROCEDURE NOTES
Procedure Performed: TEQUILA Anesthesia  Start Time:  4/16/2024 11:35 AM        Preanesthesia Checklist    Patient identified, IV assessed, risks and benefits discussed, monitors and equipment assessed, procedure being performed at surgeon's request and anesthesia consent obtained.      Procedure     Type of TEQUILA: interventional TEQUILA with real time guidance of intracardiac procedure. Images Saved: ultrasound permanent image saved. Physician Requesting Echo: Jacob Macias DO.  Location performed: OR. Intubated.  Heart visualized. Insertion of TEQUILA Probe:  Easy. Probe Type:  Epiaortic and multiplane. Modalities:  Color flow mapping, 3D, continuous wave Doppler and pulse wave Doppler.      Echocardiographic and Doppler Measurements    PREPROCEDURE    LEFT VENTRICLE:  Systolic Function: normal. Ejection Fraction: 60-65%. Cavity size: normal.   Regional Wall Motion Abnormalities: none.    RIGHT VENTRICLE:  Systolic Function: normal.  Cavity size normal. No hypertrophy              AORTIC VALVE:  Leaflets: trileaflet. Leaflet motions restricted. Stenosis: severe. Mean Gradient: 54 mmHg. Peak Gradient: 90 mmHg.  Area: 0.8 cm². Regurgitation: trace.      MITRAL VALVE:  Leaflets: normal. Leaflet Motions: normal. Regurgitation: mild.   Stenosis: none.       TRICUSPID VALVE:  Leaflets: normal. Leaflet Motions: normal. Stenosis: none. Regurgitation: mild.      PULMONIC VALVE:  Leaflets: normal. Regurgitation: trace. Stenosis: none.        ASCENDING AORTA:  Size:  normal.  Dissection not present.      AORTIC ARCH:  Size:  normal.  dissection not present. Grade 2: severe intimal thickening without protruding atheroma.    DESCENDING AORTA:  Size: normal.  Dissection not present. Grade 3: atheroma protruding < 0.5 cm into lumen.        RIGHT ATRIUM:  Size:  normal. No spontaneous echo contrast.    LEFT ATRIUM:  Size: normal. No spontaneous echo contrast.    LEFT ATRIAL APPENDAGE:  Size: normal. No spontaneous echo contrast         ATRIAL  SEPTUM:  Intra-atrial septal morphology: normal.          VENTRICULAR SEPTUM:  Intra-ventricular septum morphology: normal.             OTHER FINDINGS:  Pericardium:  normal. Pleural Effusion:  none.        POSTPROCEDURE    LEFT VENTRICLE: Unchanged .         RIGHT VENTRICLE: Unchanged .           AORTIC VALVE:   Leaflets: bioprosthetic. Stenosis: none. Mean Gradient: 7 mmHg. Regurgitation: 3 trace PVLs seen.Trace to mild and trace.  Valve Size: 23 mm.    MITRAL VALVE: Unchanged .         TRICUSPID VALVE: Unchanged .        PULMONIC VALVE: Unchanged             ATRIA: Unchanged .          AORTA: Unchanged .        REMOVAL:  Probe Removal: atraumatic.

## 2024-04-16 NOTE — ANESTHESIA PROCEDURE NOTES
Arterial Line Insertion    Performed by: Juan Meyers CRNA  Authorized by: Treasure Bnaks MD  Consent: Verbal consent obtained. Written consent obtained.  Risks and benefits: risks, benefits and alternatives were discussed  Consent given by: patient  Patient understanding: patient states understanding of the procedure being performed  Patient consent: the patient's understanding of the procedure matches consent given  Procedure consent: procedure consent matches procedure scheduled  Relevant documents: relevant documents present and verified  Test results: test results available and properly labeled  Patient identity confirmed: verbally with patient, hospital-assigned identification number and arm band  Preparation: Patient was prepped and draped in the usual sterile fashion.  Indications: multiple ABGs and hemodynamic monitoring  Orientation:  Left  Location: radial artery  Sedation:  Patient sedated: no    Procedure Details:  Needle gauge: 18  Seldinger technique: Seldinger technique used  Cutdown: cutdown required  Number of attempts: 1    Post-procedure:  Post-procedure: dressing applied and line sutured  Waveform: good waveform and waveform confirmed  Post-procedure CNS: normal and unchanged  Patient tolerance: patient tolerated the procedure well with no immediate complications

## 2024-04-16 NOTE — INTERVAL H&P NOTE
H&P reviewed. After examining the patient I find no changes in the patients condition since the H&P had been written.    Anticipated Length of Stay:  Patient will be admitted on an Inpatient basis with an anticipated length of stay of  greater than 2 midnights.       Justification for Hospital Stay:  Post surgical recovery following open heart surgery.      Vitals:    04/16/24 0853   BP: 152/81   Pulse: 67   Resp: 20   Temp: 97.7 °F (36.5 °C)   SpO2: 97%

## 2024-04-16 NOTE — OP NOTE
OPERATIVE REPORT  PATIENT NAME: Song Camargo    :  1945  MRN: [id number]  Pt Location: [facility] OR ROOM 02    SURGERY DATE: 2024    SURGEON: Jacob Macias DO     ASSISTANT: Devin Cassidy PA-C    CO-SURGEON: Dr. Azeem Sharpe     PREOPERATIVE DIAGNOSIS Symptomatic severe aortic stenosis.     POSTOPERATIVE DIAGNOSIS Symptomatic severe aortic stenosis.     NYHA CLASS: 3    CCS CLASS: 2    PROCEDURE Transcatheter aortic valve replacement with a 23 mm Wu REGINE 3 Ultra Resilia bioprosthetic valve via a percutaneous left transfemoral approach.     ANESTHESIA Dr. Treasure Banks, general endotracheal anesthesia with transesophageal echocardiogram guidance.     CARDIOPULMONARY BYPASS TIME 0.    PACKS/TUBES/DRAINS None.     ESTIMATED BLOOD LOSS: 5 mL    OPERATIVE TECHNIQUE The patient was taken to the operating room and placed supine on the operating table. Following the satisfactory induction of general anesthesia and placement of monitoring lines, the patient was prepped and draped in the usual sterile fashion. A time-out procedure was performed.     The left common femoral artery was accessed percutaneously using Seldinger technique and fluoroscopy.  Two (2) Perclose sutures were deployed in the standard fashion. The right common femoral vein was accessed in a similar fashion and was cannulated with a 6 Spanish sheath. The femoral artery was cannulated with a 7 Spanish sheath. A pigtail catheter was inserted and advanced through the left common femoral artery sheath into the right coronary cusp. Using a series of injections, the angle of deployment was determined. Through the right common femoral vein sheath, a balloon tip temporary pacing catheter was inserted into the right ventricle and its capture was confirmed.     The patient was systemically heparinized. The left common femoral artery sheath was then removed over an extra-stiff wire and the delivery sheath was inserted through the left common  femoral artery and advanced into the aorta. The aortic valve was crossed with a wire.    A 23 mm REGINE 3 Ultra Resilia valve was then advanced through the sheath into the aorta and positioned onto the deployment balloon in the aorta and then advanced around the aortic arch and through the annulus of the aortic valve to the appropriate level. At this point, the catheter was desheathed in the standard fashion. The valve was positioned appropriately using a combination of fluoroscopy and transesophageal echocardiogram guidance. During an episode of rapid pacing, balloon deployment of the 23 mm REGINE 3 Ultra Resilia valve was performed. Following deployment, the position of the valve was confirmed by fluoroscopy and echocardiography and its position appeared appropriate with trace perivalvular leak.     The valve delivery system was subsequently removed and the sheath was removed from the left common femoral artery while the Perclose sutures were secured and direct pressure was held. Protamine was administered with normalization of the ACT. Pressure was released from the left groin and there was no active bleeding and no evidence of hematoma development. The common femoral vein sheath was removed from the right and pressure was held.     Sponge, needle, and instrument counts were reported as correct by the nursing staff. As the attending surgeon, I was present and scrubbed for all critical portions of this procedure. Final transesophageal echocardiogram demonstrated a well-positioned bioprosthetic transcatheter aortic valve with normal function and trace perivalvular leak.     The case required the expertise of a cardiac surgeon as well as an interventional cardiologist to act as co-surgeons per CMS requirements.      SIGNATURE: Jacob Macias DO  DATE: April 16, 2024  TIME: 12:15 PM

## 2024-04-17 ENCOUNTER — APPOINTMENT (INPATIENT)
Dept: NON INVASIVE DIAGNOSTICS | Facility: HOSPITAL | Age: 79
DRG: 267 | End: 2024-04-17
Payer: MEDICARE

## 2024-04-17 ENCOUNTER — APPOINTMENT (INPATIENT)
Dept: RADIOLOGY | Facility: HOSPITAL | Age: 79
DRG: 267 | End: 2024-04-17
Payer: MEDICARE

## 2024-04-17 VITALS
SYSTOLIC BLOOD PRESSURE: 131 MMHG | BODY MASS INDEX: 26.81 KG/M2 | HEIGHT: 69 IN | DIASTOLIC BLOOD PRESSURE: 69 MMHG | HEART RATE: 72 BPM | OXYGEN SATURATION: 96 % | WEIGHT: 181 LBS | TEMPERATURE: 98.4 F | RESPIRATION RATE: 18 BRPM

## 2024-04-17 LAB
ABO GROUP BLD BPU: NORMAL
ANION GAP SERPL CALCULATED.3IONS-SCNC: 6 MMOL/L (ref 4–13)
AORTIC VALVE MEAN VELOCITY: 34.8 M/S
ATRIAL RATE: 68 BPM
ATRIAL RATE: 87 BPM
AV AREA BY CONTINUOUS VTI: 0.8 CM2
AV AREA BY CONTINUOUS VTI: 2.3 CM2
AV AREA PEAK VELOCITY: 0.8 CM2
AV LVOT MEAN GRADIENT: 3 MMHG
AV LVOT MEAN GRADIENT: 4.9 MMHG
AV LVOT PEAK GRADIENT: 5 MMHG
AV LVOT PEAK GRADIENT: 7.9 MMHG
AV MEAN GRADIENT: 12.7 MMHG
AV MEAN GRADIENT: 54 MMHG
AV PEAK GRADIENT: 23.7 MMHG
AV PEAK GRADIENT: 90 MMHG
AV VALVE AREA: 0.8 CM2
AV VALVE AREA: 2.07 CM2
AV VELOCITY RATIO: 0.58
BPU ID: NORMAL
BSA FOR ECHO PROCEDURE: 1.98 M2
BUN SERPL-MCNC: 16 MG/DL (ref 5–25)
CALCIUM SERPL-MCNC: 8.3 MG/DL (ref 8.4–10.2)
CHLORIDE SERPL-SCNC: 106 MMOL/L (ref 96–108)
CO2 SERPL-SCNC: 28 MMOL/L (ref 21–32)
CREAT SERPL-MCNC: 0.74 MG/DL (ref 0.6–1.3)
CROSSMATCH: NORMAL
DOP CALC AO PEAK VEL: 2.4 M/S
DOP CALC AO VTI: 127 CM
DOP CALC AO VTI: 53 CM
DOP CALC LVOT AREA: 3.46 CM2
DOP CALC LVOT AREA: 3.5
DOP CALC LVOT DIAMETER: 2.1 CM
DOP CALC LVOT DIAMETER: 2.1 CM
DOP CALC LVOT PEAK VEL VTI: 29.5 CM
DOP CALC LVOT PEAK VEL VTI: 31.7 CM
DOP CALC LVOT PEAK VEL: 1.11 M/S
DOP CALC LVOT PEAK VEL: 1.4 M/S
DOP CALC LVOT STROKE INDEX: 51.5 ML/M2
DOP CALC LVOT STROKE VOLUME: 102
DOP CALC LVOT STROKE VOLUME: 109.74 CM3
ERYTHROCYTE [DISTWIDTH] IN BLOOD BY AUTOMATED COUNT: 12.7 % (ref 11.6–15.1)
GFR SERPL CREATININE-BSD FRML MDRD: 88 ML/MIN/1.73SQ M
GLUCOSE SERPL-MCNC: 133 MG/DL (ref 65–140)
GLUCOSE SERPL-MCNC: 134 MG/DL (ref 65–140)
GLUCOSE SERPL-MCNC: 137 MG/DL (ref 65–140)
HCT VFR BLD AUTO: 40.8 % (ref 36.5–49.3)
HGB BLD-MCNC: 13.8 G/DL (ref 12–17)
IVC: 16 MM
MAGNESIUM SERPL-MCNC: 2.4 MG/DL (ref 1.9–2.7)
MCH RBC QN AUTO: 31.9 PG (ref 26.8–34.3)
MCHC RBC AUTO-ENTMCNC: 33.8 G/DL (ref 31.4–37.4)
MCV RBC AUTO: 94 FL (ref 82–98)
P AXIS: 39 DEGREES
P AXIS: 66 DEGREES
PLATELET # BLD AUTO: 188 THOUSANDS/UL (ref 149–390)
PMV BLD AUTO: 11 FL (ref 8.9–12.7)
POTASSIUM SERPL-SCNC: 3.9 MMOL/L (ref 3.5–5.3)
PR INTERVAL: 154 MS
PR INTERVAL: 160 MS
QRS AXIS: 11 DEGREES
QRS AXIS: 4 DEGREES
QRSD INTERVAL: 83 MS
QRSD INTERVAL: 86 MS
QT INTERVAL: 342 MS
QT INTERVAL: 420 MS
QTC INTERVAL: 412 MS
QTC INTERVAL: 446 MS
RA PRESSURE ESTIMATED: 3 MMHG
RBC # BLD AUTO: 4.32 MILLION/UL (ref 3.88–5.62)
SL CV LV EF: 75
SODIUM SERPL-SCNC: 140 MMOL/L (ref 135–147)
T WAVE AXIS: 172 DEGREES
T WAVE AXIS: 44 DEGREES
UNIT DISPENSE STATUS: NORMAL
UNIT PRODUCT CODE: NORMAL
UNIT PRODUCT VOLUME: 350 ML
UNIT RH: NORMAL
VENTRICULAR RATE: 68 BPM
VENTRICULAR RATE: 87 BPM
WBC # BLD AUTO: 10.04 THOUSAND/UL (ref 4.31–10.16)

## 2024-04-17 PROCEDURE — 99238 HOSP IP/OBS DSCHRG MGMT 30/<: CPT | Performed by: PHYSICIAN ASSISTANT

## 2024-04-17 PROCEDURE — 93306 TTE W/DOPPLER COMPLETE: CPT

## 2024-04-17 PROCEDURE — 80048 BASIC METABOLIC PNL TOTAL CA: CPT | Performed by: PHYSICIAN ASSISTANT

## 2024-04-17 PROCEDURE — 85027 COMPLETE CBC AUTOMATED: CPT | Performed by: PHYSICIAN ASSISTANT

## 2024-04-17 PROCEDURE — 99222 1ST HOSP IP/OBS MODERATE 55: CPT | Performed by: INTERNAL MEDICINE

## 2024-04-17 PROCEDURE — NC001 PR NO CHARGE: Performed by: THORACIC SURGERY (CARDIOTHORACIC VASCULAR SURGERY)

## 2024-04-17 PROCEDURE — 97166 OT EVAL MOD COMPLEX 45 MIN: CPT

## 2024-04-17 PROCEDURE — 83735 ASSAY OF MAGNESIUM: CPT | Performed by: PHYSICIAN ASSISTANT

## 2024-04-17 PROCEDURE — 82948 REAGENT STRIP/BLOOD GLUCOSE: CPT

## 2024-04-17 PROCEDURE — 93306 TTE W/DOPPLER COMPLETE: CPT | Performed by: INTERNAL MEDICINE

## 2024-04-17 PROCEDURE — 71045 X-RAY EXAM CHEST 1 VIEW: CPT

## 2024-04-17 PROCEDURE — 97110 THERAPEUTIC EXERCISES: CPT

## 2024-04-17 PROCEDURE — 93010 ELECTROCARDIOGRAM REPORT: CPT | Performed by: INTERNAL MEDICINE

## 2024-04-17 PROCEDURE — 97163 PT EVAL HIGH COMPLEX 45 MIN: CPT

## 2024-04-17 PROCEDURE — 93005 ELECTROCARDIOGRAM TRACING: CPT

## 2024-04-17 RX ORDER — PANTOPRAZOLE SODIUM 40 MG/1
40 TABLET, DELAYED RELEASE ORAL DAILY
Qty: 30 TABLET | Refills: 0 | Status: SHIPPED | OUTPATIENT
Start: 2024-04-18 | End: 2024-05-18

## 2024-04-17 RX ORDER — ACETAMINOPHEN 325 MG/1
650 TABLET ORAL EVERY 4 HOURS PRN
COMMUNITY
Start: 2024-04-17

## 2024-04-17 RX ORDER — CLOPIDOGREL BISULFATE 75 MG/1
75 TABLET ORAL DAILY
Qty: 90 TABLET | Refills: 0 | Status: SHIPPED | OUTPATIENT
Start: 2024-04-18 | End: 2024-07-17

## 2024-04-17 RX ORDER — POLYETHYLENE GLYCOL 3350 17 G/17G
17 POWDER, FOR SOLUTION ORAL DAILY
Qty: 510 G | Refills: 0 | Status: SHIPPED | OUTPATIENT
Start: 2024-04-18 | End: 2024-05-18

## 2024-04-17 RX ADMIN — CHLORHEXIDINE GLUCONATE 15 ML: 1.2 SOLUTION ORAL at 09:30

## 2024-04-17 RX ADMIN — HEPARIN SODIUM 5000 UNITS: 5000 INJECTION INTRAVENOUS; SUBCUTANEOUS at 05:01

## 2024-04-17 RX ADMIN — CEFAZOLIN SODIUM 2000 MG: 2 SOLUTION INTRAVENOUS at 11:53

## 2024-04-17 RX ADMIN — ASPIRIN 81 MG CHEWABLE TABLET 81 MG: 81 TABLET CHEWABLE at 09:30

## 2024-04-17 RX ADMIN — HYDROCHLOROTHIAZIDE 12.5 MG: 12.5 TABLET ORAL at 09:30

## 2024-04-17 RX ADMIN — MUPIROCIN 1 APPLICATION: 20 OINTMENT TOPICAL at 09:30

## 2024-04-17 RX ADMIN — LISINOPRIL 20 MG: 20 TABLET ORAL at 09:30

## 2024-04-17 RX ADMIN — CEFAZOLIN SODIUM 2000 MG: 2 SOLUTION INTRAVENOUS at 03:35

## 2024-04-17 RX ADMIN — PANTOPRAZOLE SODIUM 40 MG: 40 TABLET, DELAYED RELEASE ORAL at 05:01

## 2024-04-17 RX ADMIN — AMLODIPINE BESYLATE 2.5 MG: 2.5 TABLET ORAL at 09:30

## 2024-04-17 RX ADMIN — CLOPIDOGREL BISULFATE 75 MG: 75 TABLET ORAL at 09:30

## 2024-04-17 RX ADMIN — ATORVASTATIN CALCIUM 40 MG: 40 TABLET, FILM COATED ORAL at 09:30

## 2024-04-17 NOTE — DISCHARGE SUMMARY
Discharge Summary - Cardiothoracic Surgery   Song Camargo 78 y.o. male MRN: [id number]  Unit/Bed#: [facility] 409-01 Encounter: [id number]    Admission Date: 4/16/2024     Discharge Date: 04/17/24    Admitting Diagnosis: Nonrheumatic aortic valve stenosis [I35.0]    Primary Discharge Diagnosis:   Aortic stenosis, Non-Rheumatic. S/P transfemoral transcatheter aortic valve replacement;    Secondary Discharge Diagnosis:   Severe AS, CAD (+Family Hx), CHF, NSR, HTN, Hyperlipidemia, Basal Cell on nose, Prostate Cancer    Attending: Jacob Macias D.O.    Consulting Physician(s):   Cardiology  Gerontology    Procedures Performed:   Procedure(s):  REPLACEMENT AORTIC VALVE TRANSCATHETER (TAVR) TRANSFEMORAL W/ 23MM OLIVEIRA REGINE S3 UR VALVE(ACCESS ON LEFT) TEQUILA  Cardiac tavr     Hospital Course:   4/16: Elective admission for TF TAVR # 23mm via L/R approach. Extubated and transferred to PACU supported with Cardene. DP pulses dopplerable b/l. Restarted on home Norvasc, 2.5 mg QD, HCTZ, 12.5 mg QD, and Lisinopril, 20 mg QD. ECG shows NSR, without conduction delay. Gerontology and cardiology consulted. PM: continues to be dopperlable, R>L.      4/17: EKG with NSR. Labs stable. Echo done, Colleen reviewed. D/C to home today.       Condition at Discharge:   good     Discharge Physical Exam:    Please see the documented physical exam from this morning's progress note for details.    Discharge Data:  Results from last 7 days   Lab Units 04/17/24  0432 04/16/24  1253 04/16/24  1249 04/16/24  1216   WBC Thousand/uL 10.04  --   --   --    HEMOGLOBIN g/dL 13.8  --  14.4  --    I STAT HEMOGLOBIN g/dl  --   --   --  11.2*   HEMATOCRIT % 40.8  --  41.7  --    HEMATOCRIT, ISTAT %  --   --   --  33*   PLATELETS Thousands/uL 188 187 200  --      Results from last 7 days   Lab Units 04/17/24  0432 04/16/24  1249 04/16/24  1216   POTASSIUM mmol/L 3.9 3.7  --    CHLORIDE mmol/L 106 108  --    CO2 mmol/L 28 26  --    CO2, I-STAT mmol/L  --   --  24    BUN mg/dL 16 13  --    CREATININE mg/dL 0.74 0.70  --    GLUCOSE, ISTAT mg/dl  --   --  102   CALCIUM mg/dL 8.3* 8.2*  --            Discharge instructions/Information to patient and family:   See after visit summary for information provided to patient and family.      Song Camargo was educated on restrictions regarding driving and lifting, and techniques of proper incisional care.  They were specifically counselled on signs and symptoms of an incisional infection, and advised to contact our service immediately should they develop fevers, sweats, chill, redness or drainage at the site of any incisions.    Provisions for Follow-Up Care:  See after visit summary for information related to follow-up care and any pertinent home health orders.      Disposition:  Home    Planned Readmission:   No    Discharge Medications:  See after visit summary for reconciled discharge medications provided to patient and family.      Song Camargo was provided contact information and scheduled a follow up appointment with Jacbo Macias D.O.  Additionally, follow up appointments have been scheduled for their primary care physician and primary cardiologist.  Contact information was provided.    Song Camargo was counseled on the importance of avoiding tobacco products.  As with all patients whom have undergone open heart surgery, tobacco cessation medication was contraindicated at the time of discharge.     ACE/ARB was Prescribed at discharge    Beta Blocker was Contraindicated secondary to hypotension    Aspirin was Prescribed at discharge    Statin was Prescribed at discharge      The patient was informed that following their postoperative surgical evaluation, they will be referred to outpatient cardiac rehabilitation.  They were counseled that this program is run by specialists who will help them safely strengthen their heart and prevent more heart disease.  Cardiac rehabilitation will include exercise, relaxation, stress  management, and heart-healthy nutrition.  Caregivers will also check to make sure their medication regimen is working.    During this admission, the patient was questioned on their use of tobacco, alcohol, and illicit/non-prescription drug use in the  previous 24 months. Song Camargo states that they have not used any of these substances in this time frame.    I spent 30 minutes discharging the patient. This time was spent on the day of discharge. I had direct contact with the patient on the day of discharge. Additional documentation is required if more than 30 minutes were spent on discharge.     SIGNATURE: Jessy Wan PA-C  DATE: April 17, 2024  TIME: 11:51 AM

## 2024-04-17 NOTE — PLAN OF CARE
Problem: PHYSICAL THERAPY ADULT  Goal: Performs mobility at highest level of function for planned discharge setting.  See evaluation for individualized goals.  Description: Treatment/Interventions: LE strengthening/ROM, Elevations, Therapeutic exercise, Endurance training, Bed mobility, Gait training, Spoke to nursing, Spoke to case management, OT          See flowsheet documentation for full assessment, interventions and recommendations.  Note: Prognosis: Good  Problem List: Decreased strength, Decreased endurance, Impaired balance, Decreased mobility  Assessment: Pt is 78 y.o. male admitted with Dx of Nonrheumatic aortic valve stenosis and underwent Transcatheter aortic valve replacement with a 23 mm Wu REGINE 3 Ultra Resilia bioprosthetic valve via a percutaneous left transfemoral approach on 4/16/2024. Pt 's comorbidities affecting POC include: Arthritis, Coronary artery disease, High blood pressure, and Lumbar radiculopathy and personal factors of: living alone and JESSE. Pt's clinical presentation is currently  unstable/unpredictable which is evident in ongoing telem monitoring and ongoing postop management. Pt presents w/ min postop guarding, min overall weakness, min decreased endurance and min inconsistent amb balance and gait patterns w/ overall observed mobility status one level surfaces and elevations appearing to be near premorbid level. Will cont to follow pt in PT for progressive mobilization to max level of endurance and safety. D/C recommendations are outlined below. Will cont to follow until then.        Rehab Resource Intensity Level, PT: No post-acute rehabilitation needs    See flowsheet documentation for full assessment.

## 2024-04-17 NOTE — CONSULTS
Consultation - Cardiology Team One  Song Camargo 78 y.o. male MRN: [id number]  Unit/Bed#: [facility] 409-01 Encounter: [id number]    Inpatient consult to Cardiology  Consult performed by: NIR Recinos  Consult ordered by: Devin Cassidy PA-C          Physician Requesting Consult: Jacob Macias DO  Reason for Consult / Principal Problem: s/p TAVR       Assessment    Symptomatic severe aortic stenosis  Hypertension   Non obstructive CAD   Preserved LV function      Plan    POD #1 s/p TAVR with a 23 mm Wu REGINE 3 Ultra Resilia bioprosthetic valve via percutaneous L TF approach   Final transesophageal echocardiogram demonstrated a well-positioned bioprosthetic transcatheter aortic valve with normal function and trace perivalvular leak.   Reviewed ECG   Echocardiogram pending   On DAPT: aspirin and plavix   BP stable: 126/65  On amlodipine 2.5 mg PO daily, HCTz 12.5 mg PO daily and lisinopril 20 mg PO daily       History of Present Illness   HPI: Song Camargo is a 78 y.o. year old male who has a history of aortic stenosis and hypertension. He follows with cardiologist Dr. Page.             He presents to [facility] 4/16/2024 for elective TAVR. He is now POD #1 s/p TAVR with a 23 mm Wu REGINE 3 Ultra Resilia bioprosthetic valve via percutaneous L TF approach. Final transesophageal echocardiogram demonstrated a well-positioned bioprosthetic transcatheter aortic valve with normal function and trace perivalvular leak.     Cardiac cath 3/13/2024 showed proximal RCA 30% stenosis, mid RCA 40% stenosis, distal RCA 30% stenosis, distal L main 20% stenosis and ostial Cx 30% stenosis.     Echocardiogram 2/5/2024 showed EF 60%, grade II diastolic dysfunction, severe aortic stenosis, mild MR and moderate TR.     EKG reviewed personally:   Normal sinus rhythm with non specific T wave abnormalities   Ventricular rate 68 bpm  MT Interval 160 ms  QRSD 86 ms  QT interval 420 ms  QTc interval 446 ms     Telemetry reviewed  personally:   Normal sinus rhythm, no ectopy     Review of Systems   Constitutional: Negative for chills and fever.   HENT:  Negative for congestion.    Cardiovascular:  Negative for chest pain, dyspnea on exertion, leg swelling, orthopnea and palpitations.   Respiratory:  Negative for shortness of breath.    Musculoskeletal:  Negative for falls.   Gastrointestinal:  Negative for bloating, nausea and vomiting.   Neurological:  Negative for dizziness and light-headedness.   Psychiatric/Behavioral:  Negative for altered mental status.    All other systems reviewed and are negative.    Historical Information   Past Medical History:   Diagnosis Date    Arthritis     Basal cell carcinoma (BCC)     Coronary artery disease     High blood pressure     High cholesterol     Lumbar radiculopathy 4/15/2024    Prostate CA (HCC)      Past Surgical History:   Procedure Laterality Date    APPENDECTOMY      CARDIAC CATHETERIZATION N/A 3/13/2024    Procedure: Cardiac RHC/LHC;  Surgeon: Mir Sharpe MD;  Location: [facility];  Service: Cardiology    COLONOSCOPY      MOHS SURGERY  2004    PROSTATECTOMY  2010    SHOULDER SURGERY Right     TONSILLECTOMY      VASECTOMY       Social History     Substance and Sexual Activity   Alcohol Use Yes    Alcohol/week: 8.0 standard drinks of alcohol    Types: 8 Glasses of wine per week    Comment: drinks one glass of wine on the weekdays and maybe two on the weekend     Social History     Substance and Sexual Activity   Drug Use Never     Social History     Tobacco Use   Smoking Status Former    Current packs/day: 0.00    Types: Cigarettes    Quit date: 2004    Years since quittin.0   Smokeless Tobacco Never     Family History:   Family History   Problem Relation Age of Onset    Sudden death Mother         cardiac    Hypertension Father     Coronary artery disease Father     Cancer Father     Hyperlipidemia Father        Meds/Allergies   all current active meds have been  reviewed and current meds:   Current Facility-Administered Medications   Medication Dose Route Frequency    acetaminophen (TYLENOL) rectal suppository 650 mg  650 mg Rectal Q4H PRN    acetaminophen (TYLENOL) tablet 650 mg  650 mg Oral Q4H PRN    amLODIPine (NORVASC) tablet 2.5 mg  2.5 mg Oral Daily    aspirin chewable tablet 81 mg  81 mg Oral Daily    atorvastatin (LIPITOR) tablet 40 mg  40 mg Oral Daily    bisacodyl (DULCOLAX) rectal suppository 10 mg  10 mg Rectal Daily PRN    ceFAZolin (ANCEF) IVPB (premix in dextrose) 2,000 mg 50 mL  2,000 mg Intravenous Q8H    chlorhexidine (PERIDEX) 0.12 % oral rinse 15 mL  15 mL Mouth/Throat BID    clopidogrel (PLAVIX) tablet 75 mg  75 mg Oral Daily    EPINEPHrine 5,000 mcg (STANDARD CONCENTRATION) IV in sodium chloride 0.9% 250 mL  1-10 mcg/min Intravenous Titrated    heparin (porcine) subcutaneous injection 5,000 Units  5,000 Units Subcutaneous Q8H YENY    hydrALAZINE (APRESOLINE) injection 10 mg  10 mg Intravenous Q6H PRN    hydroCHLOROthiazide tablet 12.5 mg  12.5 mg Oral Daily    insulin lispro (HumALOG/ADMELOG) 100 units/mL subcutaneous injection 1-6 Units  1-6 Units Subcutaneous TID AC    insulin lispro (HumALOG/ADMELOG) 100 units/mL subcutaneous injection 1-6 Units  1-6 Units Subcutaneous HS    insulin regular 100 units in sodium chloride 0.9% 100 mL  100 Units Intravenous Titrated    lisinopril (ZESTRIL) tablet 20 mg  20 mg Oral Daily    mupirocin (BACTROBAN) 2 % nasal ointment 1 Application  1 Application Nasal Q12H YENY    niCARdipine (CARDENE) 25 mg (STANDARD CONCENTRATION) in sodium chloride 0.9% 250 mL  1-15 mg/hr Intravenous Titrated    ondansetron (ZOFRAN) injection 4 mg  4 mg Intravenous Q6H PRN    pantoprazole (PROTONIX) EC tablet 40 mg  40 mg Oral Daily    phenylephrine (MAHSA-SYNEPHRINE) 50 mg (STANDARD CONCENTRATION) in sodium chloride 0.9% 250 mL   mcg/min Intravenous Titrated    polyethylene glycol (MIRALAX) packet 17 g  17 g Oral Daily  "    EPINEPHrine 5,000 mcg (STANDARD CONCENTRATION) IV in sodium chloride 0.9% 250 mL, 1-10 mcg/min  insulin regular 100 units in sodium chloride 0.9% 100 mL, 100 Units  niCARdipine, 1-15 mg/hr, Last Rate: 1 mg/hr (24)  phenylephrine (MAHSA-SYNEPHRINE) 50 mg (STANDARD CONCENTRATION) in sodium chloride 0.9% 250 mL,  mcg/min        No Known Allergies    Objective   Vitals: Blood pressure 141/70, pulse 76, temperature 98.3 °F (36.8 °C), temperature source Oral, resp. rate 18, height 5' 9\" (1.753 m), weight 82.2 kg (181 lb 3.5 oz), SpO2 97%.,     Body mass index is 26.76 kg/m².,     Systolic (24hrs), Av , Min:108 , Max:152     Diastolic (24hrs), Av, Min:57, Max:81        Intake/Output Summary (Last 24 hours) at 2024 0848  Last data filed at 2024 0812  Gross per 24 hour   Intake 1760 ml   Output 2050 ml   Net -290 ml     Weight (last 2 days)       Date/Time Weight    24 0600 82.2 (181.22)    24 0853 81.6 (179.9)          Invasive Devices       Central Venous Catheter Line  Duration             CVC Central Lines 24 Right Internal jugular <1 day              Peripheral Intravenous Line  Duration             Peripheral IV 24 Distal;Right;Upper;Ventral (anterior) Arm 21 days    Peripheral IV 24 Right Antecubital <1 day    Peripheral IV 24 Right Wrist <1 day                  Physical Exam  Constitutional:       General: He is not in acute distress.  HENT:      Head: Normocephalic.      Mouth/Throat:      Mouth: Mucous membranes are moist.   Cardiovascular:      Rate and Rhythm: Normal rate and regular rhythm.      Pulses: Normal pulses.   Pulmonary:      Effort: Pulmonary effort is normal. No respiratory distress.      Breath sounds: Normal breath sounds.   Abdominal:      General: Bowel sounds are normal.      Palpations: Abdomen is soft.   Musculoskeletal:         General: No swelling.      Cervical back: Neck supple.   Skin:     General: Skin is warm and " dry.      Capillary Refill: Capillary refill takes less than 2 seconds.   Neurological:      Mental Status: He is alert and oriented to person, place, and time.   Psychiatric:         Mood and Affect: Mood normal.         LABORATORY RESULTS:      CBC with diff:   Results from last 7 days   Lab Units 04/17/24 0432 04/16/24  1253 04/16/24  1249 04/16/24  1216 04/16/24  1207 04/16/24  1129   WBC Thousand/uL 10.04  --   --   --   --   --    HEMOGLOBIN g/dL 13.8  --  14.4  --   --   --    I STAT HEMOGLOBIN g/dl  --   --   --  11.2* 11.9* 13.3   HEMATOCRIT % 40.8  --  41.7  --   --   --    HEMATOCRIT, ISTAT %  --   --   --  33* 35* 39   MCV fL 94  --   --   --   --   --    PLATELETS Thousands/uL 188 187 200  --   --   --    RBC Million/uL 4.32  --   --   --   --   --    MCH pg 31.9  --   --   --   --   --    MCHC g/dL 33.8  --   --   --   --   --    RDW % 12.7  --   --   --   --   --    MPV fL 11.0 10.6 10.7  --   --   --        CMP:  Results from last 7 days   Lab Units 04/17/24 0432 04/16/24  1249 04/16/24  1216 04/16/24  1207 04/16/24  1129   POTASSIUM mmol/L 3.9 3.7  --   --   --    CHLORIDE mmol/L 106 108  --   --   --    CO2 mmol/L 28 26  --   --   --    CO2, I-STAT mmol/L  --   --  24 24 29   BUN mg/dL 16 13  --   --   --    CREATININE mg/dL 0.74 0.70  --   --   --    GLUCOSE, ISTAT mg/dl  --   --  102 104 100   CALCIUM mg/dL 8.3* 8.2*  --   --   --    EGFR ml/min/1.73sq m 88 91  --   --   --        BMP:  Results from last 7 days   Lab Units 04/17/24 0432 04/16/24  1249 04/16/24  1216 04/16/24  1207 04/16/24  1129   POTASSIUM mmol/L 3.9 3.7  --   --   --    CHLORIDE mmol/L 106 108  --   --   --    CO2 mmol/L 28 26  --   --   --    CO2, I-STAT mmol/L  --   --  24 24 29   BUN mg/dL 16 13  --   --   --    CREATININE mg/dL 0.74 0.70  --   --   --    GLUCOSE, ISTAT mg/dl  --   --  102 104 100   CALCIUM mg/dL 8.3* 8.2*  --   --   --             Results from last 7 days   Lab Units 04/17/24  0432   MAGNESIUM mg/dL 2.4  "          Lipid Profile:   No results found for: \"CHOL\"  No results found for: \"HDL\"  No results found for: \"LDLCALC\"  No results found for: \"TRIG\"      Cardiac testing:   No results found for this or any previous visit.    No results found for this or any previous visit.    No valid procedures specified.  No results found for this or any previous visit.    Imaging:   XR chest portable ICU    Result Date: 4/16/2024  Narrative: XR CHEST PORTABLE ICU INDICATION: S/P Transcatheter aortic valve replacement. COMPARISON: CT from March 26, 2024 FINDINGS: TAVR in place. Right IJ catheter tip overlying the upper-mid SVC. Stable mild left hemidiaphragmatic elevation and left basilar atelectasis. No pneumothorax or pleural effusion. Normal cardiomediastinal silhouette. Bones are unremarkable for age. Normal upper abdomen.     Impression: Interval placement of TAVR. Satisfactory positioning of right IJ catheter. No acute cardiopulmonary disease. Workstation performed: [id number]     TEQUILA Anesthesia    Result Date: 4/16/2024  Narrative: Treasure Banks MD     4/16/2024 12:13 PM Procedure Performed: TEQUILA Anesthesia Start Time:  4/16/2024 11:35 AM Preanesthesia Checklist Patient identified, IV assessed, risks and benefits discussed, monitors and equipment assessed, procedure being performed at surgeon's request and anesthesia consent obtained. Procedure  Type of TEQUILA: interventional TEQUILA with real time guidance of intracardiac procedure. Images Saved: ultrasound permanent image saved. Physician Requesting Echo: Jacob Macias DO.  Location performed: OR. Intubated.  Heart visualized. Insertion of TEQUILA Probe:  Easy. Probe Type:  Epiaortic and multiplane. Modalities:  Color flow mapping, 3D, continuous wave Doppler and pulse wave Doppler. Echocardiographic and Doppler Measurements PREPROCEDURE LEFT VENTRICLE: Systolic Function: normal. Ejection Fraction: 60-65%. Cavity size: normal.   Regional Wall Motion Abnormalities: none. RIGHT " VENTRICLE: Systolic Function: normal.  Cavity size normal. No hypertrophy  AORTIC VALVE: Leaflets: trileaflet. Leaflet motions restricted. Stenosis: severe. Mean Gradient: 54 mmHg. Peak Gradient: 90 mmHg.  Area: 0.8 cm². Regurgitation: trace.  MITRAL VALVE: Leaflets: normal. Leaflet Motions: normal. Regurgitation: mild.   Stenosis: none.   TRICUSPID VALVE: Leaflets: normal. Leaflet Motions: normal. Stenosis: none. Regurgitation: mild. PULMONIC VALVE: Leaflets: normal. Regurgitation: trace. Stenosis: none. ASCENDING AORTA: Size:  normal.  Dissection not present.  AORTIC ARCH: Size:  normal.  dissection not present. Grade 2: severe intimal thickening without protruding atheroma. DESCENDING AORTA: Size: normal.  Dissection not present. Grade 3: atheroma protruding < 0.5 cm into lumen. RIGHT ATRIUM: Size:  normal. No spontaneous echo contrast. LEFT ATRIUM: Size: normal. No spontaneous echo contrast. LEFT ATRIAL APPENDAGE: Size: normal. No spontaneous echo contrast ATRIAL SEPTUM: Intra-atrial septal morphology: normal.  VENTRICULAR SEPTUM: Intra-ventricular septum morphology: normal. OTHER FINDINGS: Pericardium:  normal. Pleural Effusion:  none. POSTPROCEDURE LEFT VENTRICLE: Unchanged .    RIGHT VENTRICLE: Unchanged .  AORTIC VALVE: Leaflets: bioprosthetic. Stenosis: none. Mean Gradient: 7 mmHg. Regurgitation: 3 trace PVLs seen.Trace to mild and trace.  Valve Size: 23 mm. MITRAL VALVE: Unchanged .    TRICUSPID VALVE: Unchanged .   PULMONIC VALVE: Unchanged   ATRIA: Unchanged .   AORTA: Unchanged . REMOVAL: Probe Removal: atraumatic.      CTA chest abdomen pelvis w wo contrast TAVR    Result Date: 3/27/2024  Narrative: CT ANGIOGRAM OF THE CHEST, ABDOMEN AND PELVIS WITH [AND WITHOUT] IV CONTRAST INDICATION:  Preoperative evaluation for TAVR COMPARISON: None. TECHNIQUE:  CT angiogram examination of the chest, abdomen and pelvis was performed according to standard protocol with cardiac gating. Axial, sagittal, and coronal  reformatted images were submitted for interpretation. 3D reconstructions were performed an independent workstation, and are supplied for review. This examination, like all CT scans performed in the [facility], was performed utilizing techniques to minimize radiation dose exposure, including the use of iterative reconstruction and automated exposure control. Radiation dose length product (DLP) for this visit: 1718.76 mGy-cm . IV Contrast:  180 mL of iohexol (OMNIPAQUE) Enteric Contrast: Not given. Image Quality: Excellent. Dataset used for reformattin% VASCULAR FINDINGS: Central pulmonary artery is not abnormally enlarged. Right atrium and right ventricle are normal in size. Left atrial cavity is not abnormally dilated. Left ventricular myocardium is normal. Mild mitral annular calcification. Coronary artery origins are in typical position. Moderate coronary artery calcification. Annulus, LVOT and aorta analysis: Typical trileaflet aortic valve morphology. Optimal CT aortic valve plane angles: 3 cusp view: Danish 15, cranial 0 Cusp overlap view CHANG 6, caudal 25 Measurements: Annulus diameter (max x min): 25.5 x 20.5 mm. Annulus Area 404.3 mm2. Annulus Perimeter 72.1 mm. Annulus to left main coronary ostium: 12.3 mm. Annulus to right main coronary ostium: 16.1 mm. Sinus of Valsalva height: 12.8 mm. Aortic sinus of Valsalva diameter (max x min): 32.4 x 29.2 mm. LVOT minimal diameter: 18.9 mm. Sino-tubular junction minimal diameter: 27.2 mm. Ascending thoracic aorta maximal diameter: 35.5 mm. Valve Calcification: Aortic valve calcium score is 1828. Volume of 1069 mm3. Thoracic Aorta: Porcelain aorta = no. Aortic root and ascending thoracic aorta free of significant calcification beyond the sino-tubular junction. Aortic arch is normal in course and caliber with insignificant calcification. Descending thoracic aorta is normal in course, caliber, and contour. Abdominal aorta and pelvic artery  measurements: Abdominal aorta: Minimal diameter above aortic bifurcation: 13.6 mm Calcification: mild Tortuosity: none Right iliofemoral segment: Minimal diameter: 6.6 mm Calcification: mild Tortuosity: mild Left iliofemoral segment: Minimal diameter: 6.2 mm Calcification: mild Tortuosity: mild ADDITIONAL FINDINGS: Chest: No clinically significant findings in the lungs, pleura, mediastinum or chest wall. Abdomen: Hypodense lesions of liver likely represent cysts. No calcified gallstones. No pericholecystic inflammatory change.  No biliary dilatation.  No significant findings involving pancreas, spleen, adrenal glands, or kidneys. Pelvis: Prostate is surgically absent. No significant findings involving urinary bladder or pelvic cavity. Abdominopelvic Cavity: No ascites.  No pneumoperitoneum.  No lymphadenopathy. Osseous Structures: No acute fracture or destructive osseous lesion.     Impression: Measurements and analysis to allow for planning of Transcatheter Aortic Valve Repair as above. Workstation performed: [id number]     VAS carotid complete study    Result Date: 3/26/2024  Narrative:  [facility] REPORT CLINICAL: Indications:  Patient presents for a general health evaluation secondary to future TAVR.  Patient is asymptomatic at this time. Operative History: no reported cardiovascular surgeries Risk Factors The patient has history of HTN, Hyperlipidemia and previous smoking (quit >10yrs ago). Clinical Right Pressure:  127/70 mm Hg, Left Pressure:  122/68 mm Hg.   FINDINGS:  Right        Impression  PSV  EDV (cm/s)  Direction of Flow  Ratio  Dist. ICA                 64          20                      1.02  Mid. ICA                  80          25                      1.28  Prox. ICA    1 - 49%      57          15                      0.91  Dist CCA                  56          16                            Mid CCA                   62          15                      0.80  Prox CCA                  78           13                            Ext Carotid               89          11                      1.43  Prox Vert                 29           8  Antegrade                 Subclavian                97           0                             Left         Impression  PSV  EDV (cm/s)  Direction of Flow  Ratio  Dist. ICA                 24           7                      0.25  Mid. ICA                  67          22                      0.69  Prox. ICA    1 - 49%      44          13                      0.45  Dist CCA                  72          16                            Mid CCA                   97          17                            Ext Carotid               92          14                      0.95  Prox Vert                 65          12  Antegrade                 Subclavian                77           0                               CONCLUSION: Impression RIGHT: There is <50% stenosis noted in the internal carotid artery. Plaque is heterogenous and irregular. Vertebral artery flow is antegrade. There is no significant subclavian artery disease. LEFT: There is <50% stenosis noted in the internal carotid artery. Plaque is heterogenous and irregular. Proximal Common carotid artery was not visualized. Vertebral artery flow is antegrade. There is no significant subclavian artery disease.  There is no previous study for comparison.  Tech note: A portion of the study was performed by current DMS student under direct supervision from a registered senior vascular technologist with approval from patient.  SIGNATURE: Electronically Signed by: YANIRA MUSE DO on 2024-03-26 12:52:31 PM      Thank you for allowing us to participate in this patient's care.       Counseling / Coordination of Care  Total floor / unit time spent today 45 minutes.  Greater than 50% of total time was spent with the patient and / or family counseling and / or coordination of care.  A description of the counseling / coordination of care:  Review of history, current assessment, development of a plan.      Code Status: Level 1 - Full Code    ** Please Note: Dragon 360 Dictation voice to text software may have been used in the creation of this document. **

## 2024-04-17 NOTE — UTILIZATION REVIEW
Initial Clinical Review    Elective IP surgical procedure  Age/Sex: 78 y.o. male  Surgery Date: 4/16/2024  Procedure: Transcatheter aortic valve replacement with a 23 mm Wu REGINE 3 Ultra Resilia bioprosthetic valve via a percutaneous left transfemoral approach.   Anesthesia: General endotracheal anesthesia with transesophageal echocardiogram guidance.     POD#1 Progress Note: No events overnight. No complaints. Tolerated diet this morning no issues. Ambulated well. NSR on telemetry. Breath sounds clear b/l, groin puncture sites dressed with sterile dressing. No hematoma, erythema, or drainage.  Home meds resumed: norvasc 2.5 mg daily, HCTZ 12.5 mg daily and lisinopril 20 mg daily. ASA/Plavix. Echo to be completed today. Remove central cath today. Hep sq and SCDs. Good RA O2 sat, continue incentive spirometry/coughing/Deep breathing exercises. Cr stable. Continue to follow. Tylenol prn. Continue low sodium diet, fluid restriction. Accu-checks w/ ssi.        Admission Orders: Date/Time/Statement:   Admission Orders (From admission, onward)       Ordered        04/16/24 0902  Inpatient Admission  Once                          Orders Placed This Encounter   Procedures    Inpatient Admission     Standing Status:   Standing     Number of Occurrences:   1     Order Specific Question:   Level of Care     Answer:   Level 1 Stepdown [13]     Order Specific Question:   Estimated length of stay     Answer:   More than 2 Midnights     Order Specific Question:   Certification     Answer:   I certify that inpatient services are medically necessary for this patient for a duration of greater than two midnights. See H&P and MD Progress Notes for additional information about the patient's course of treatment.     Vital Signs:   Date/Time Temp Pulse Resp BP MAP (mmHg) Arterial Line BP MAP SpO2 Calculated FIO2 (%) - Nasal Cannula O2 Flow Rate (L/min) Nasal Cannula O2 Flow Rate (L/min) O2 Device   04/17/24 1044 98.4 °F (36.9 °C) 72  18 131/69 91 -- -- 96 % -- -- -- None (Room air)   04/17/24 1030 -- 72 -- 131/69 -- -- -- -- -- -- -- --   04/17/24 0900 -- 75 16 115/69 93 -- -- 96 % -- -- -- None (Room air)   04/17/24 0801 -- 76 -- -- -- -- -- -- -- -- -- --   04/17/24 0800 -- 74 18 132/60 -- -- -- 98 % -- -- -- None (Room air)   04/17/24 0710 98.3 °F (36.8 °C) 75 18 141/70 96 -- -- 97 % -- -- -- None (Room air)   04/17/24 0700 -- 72 16 134/68 92 -- -- 96 % -- -- -- None (Room air)   04/17/24 0600 -- 66 18 128/66 93 -- -- 98 % -- -- -- None (Room air)   04/17/24 0500 -- 64 18 129/64 94 -- -- 97 % -- -- -- None (Room air)   04/17/24 0400 -- 60 16 132/66 97 114/34 60 mmHg Abnormal  96 % -- -- -- None (Room air)   04/17/24 0301 97.8 °F (36.6 °C) -- -- -- -- -- -- -- -- -- -- --   04/17/24 0300 -- 60 16 128/67 87 116/36 62 mmHg Abnormal  95 % -- -- -- None (Room air)   04/17/24 0100 -- 64 -- 125/67 91 106/34 60 mmHg Abnormal  97 % -- -- -- --   04/17/24 0000 -- 64 -- 134/69 103 116/36 62 mmHg Abnormal  -- -- -- -- --   04/16/24 2300 97.8 °F (36.6 °C) 71 17 128/68 87 108/32 56 mmHg Abnormal  97 % 28 -- 2 L/min Nasal cannula   04/16/24 2225 -- -- -- -- -- -- -- 95 % -- -- -- None (Room air)   04/16/24 2200 -- 66 -- 126/65 89 98/30 50 mmHg Abnormal  -- -- -- -- --   04/16/24 2100 -- 72 -- 116/66 82 100/32 54 mmHg Abnormal  96 % -- -- -- --   04/16/24 2000 -- 80 -- 116/65 82 112/36 62 mmHg Abnormal  97 % -- -- -- --   04/16/24 1915 98.3 °F (36.8 °C) 81 18 113/61 83 -- -- 96 % 28 -- 2 L/min Nasal cannula   04/16/24 1900 -- 76 20 113/61 83 114/40 66 mmHg 97 % -- -- -- --   04/16/24 1800 -- 87 22 124/67 92 124/47 70 mmHg 97 % -- -- -- --   04/16/24 1700 -- 78 22 120/62 83 114/48 70 mmHg 97 % -- -- -- --   04/16/24 1630 -- 82 18 -- -- 130/52 76 mmHg 95 % -- -- -- --   04/16/24 1615 -- 73 16 119/65 85 124/47 71 mmHg 96 % -- -- -- --   04/16/24 1600 98.1 °F (36.7 °C) 78 18 125/64 86 127/58 74 mmHg 96 % 28 -- 2 L/min Nasal cannula   04/16/24 1545 -- 86 16  134/62 83 129/52 78 mmHg 97 % 28 -- 2 L/min Nasal cannula   04/16/24 1530 -- 79 21 128/67 83 109/67 77 mmHg 97 % 28 -- 2 L/min Nasal cannula   04/16/24 1515 97.9 °F (36.6 °C) 80 20 128/63 81 124/47 73 mmHg 97 % 28 -- 2 L/min Nasal cannula   04/16/24 1500 -- -- 20 -- -- -- -- 97 % 28 -- 2 L/min Nasal cannula   04/16/24 1458 -- 76 20 -- -- 120/44 68 mmHg 96 % -- -- -- --   04/16/24 1445 -- 76 20 108/57 78 114/44 66 mmHg 97 % 28 -- 2 L/min Nasal cannula   04/16/24 1430 -- 78 20 -- -- 120/44 68 mmHg 97 % 28 -- 2 L/min Nasal cannula   04/16/24 1415 -- 80 20 115/57 83 120/46 70 mmHg 97 % 28 -- 2 L/min Nasal cannula   04/16/24 1400 -- 82 20 121/64 91 126/48 72 mmHg 96 % 28 -- 2 L/min Nasal cannula   04/16/24 1345 -- 78 20 119/58 79 118/44 66 mmHg 94 % 28 -- 2 L/min Nasal cannula   04/16/24 1330 -- 78 18 118/60 75 120/44 68 mmHg 94 % 28 -- 2 L/min Nasal cannula   04/16/24 1315 -- 76 18 124/58 78 120/46 70 mmHg 94 % 28 -- 2 L/min Nasal cannula   04/16/24 1300 -- 78 18 123/61 86 128/50 74 mmHg 93 % 28 -- 2 L/min Nasal cannula   04/16/24 1245 -- 82 18 132/65 87 140/52 80 mmHg 92 % 28 -- 2 L/min Nasal cannula   04/16/24 1233 97.7 °F (36.5 °C) 88 18 141/66 81 136/52 80 mmHg 98 % -- 6 L/min -- Simple mask   04/16/24 0907 -- -- -- -- -- -- -- -- 28 -- 2 L/min Nasal cannula   04/16/24 0853 97.7 °F (36.5 °C) 67 20 152/81 -- -- -- 97 % -- -- -- None (Room air)       Pertinent Labs/Diagnostic Test Results:   XR chest portable ICU   Final Result by Yinka Maldonado MD (04/16 1654)      Interval placement of TAVR. Satisfactory positioning of right IJ catheter.   No acute cardiopulmonary disease.         XR chest portable  4/17:  line in position, stable left diaphragm elevation, bases clear           Results from last 7 days   Lab Units 04/17/24  0432 04/16/24  1253 04/16/24  1249 04/16/24  1216 04/16/24  1207 04/16/24  1129   WBC Thousand/uL 10.04  --   --   --   --   --    HEMOGLOBIN g/dL 13.8  --  14.4  --   --   --    I STAT  HEMOGLOBIN g/dl  --   --   --  11.2* 11.9* 13.3   HEMATOCRIT % 40.8  --  41.7  --   --   --    HEMATOCRIT, ISTAT %  --   --   --  33* 35* 39   PLATELETS Thousands/uL 188 187 200  --   --   --      Results from last 7 days   Lab Units 04/17/24  0432 04/16/24  1249 04/16/24  1216 04/16/24  1207 04/16/24  1129   SODIUM mmol/L 140 140  --   --   --    POTASSIUM mmol/L 3.9 3.7  --   --   --    CHLORIDE mmol/L 106 108  --   --   --    CO2 mmol/L 28 26  --   --   --    CO2, I-STAT mmol/L  --   --  24 24 29   ANION GAP mmol/L 6 6  --   --   --    BUN mg/dL 16 13  --   --   --    CREATININE mg/dL 0.74 0.70  --   --   --    EGFR ml/min/1.73sq m 88 91  --   --   --    CALCIUM mg/dL 8.3* 8.2*  --   --   --    CALCIUM, IONIZED, ISTAT mmol/L  --   --  1.14 1.15 1.20   MAGNESIUM mg/dL 2.4  --   --   --   --        Results from last 7 days   Lab Units 04/17/24  1043 04/17/24  0437 04/16/24  2056 04/16/24  1558 04/16/24  1239 04/16/24  0929   POC GLUCOSE mg/dl 137 133 168* 138 111 105     Results from last 7 days   Lab Units 04/17/24  0432 04/16/24  1249   GLUCOSE RANDOM mg/dL 134 106     Results from last 7 days   Lab Units 04/16/24  1216 04/16/24  1207 04/16/24  1129   PH, CRISTIAN I-STAT   --   --  7.347   PCO2, CRISTIAN ISTAT mm HG  --   --  50.2*   PO2, CRISTIAN ISTAT mm HG  --   --  57.0*   HCO3, CRISTIAN ISTAT mmol/L  --   --  27.5   I STAT BASE EXC mmol/L -1 -1 1   I STAT O2 SAT % 100* 99* 87*   ISTAT PH ART  7.416 7.429  --    I STAT ART PCO2 mm HG 35.5* 34.6*  --    I STAT ART PO2 mm .0* 125.0  --    I STAT ART HCO3 mmol/L 22.8 22.9  --      Results from last 7 days   Lab Units 04/17/24  0758   UNIT PRODUCT CODE  Y4529P24  A1537L13  F9362N13  E7668L41   UNIT NUMBER  [id number]  [id number]  [id number]  [id number]   UNITABO  A  A  A  A   UNITRH  POS  POS  POS  POS   CROSSMATCH  Compatible  Compatible  Compatible  Compatible   UNIT DISPENSE STATUS  Return to Inv  Return to Inv  Return to Inv  Return to  Inv   UNIT PRODUCT VOL mL 350  350  350  350     Scheduled Medications:  amLODIPine, 2.5 mg, Oral, Daily  aspirin, 81 mg, Oral, Daily  atorvastatin, 40 mg, Oral, Daily  cefazolin, 2,000 mg, Intravenous, Q8H  chlorhexidine, 15 mL, Mouth/Throat, BID  clopidogrel, 75 mg, Oral, Daily  heparin (porcine), 5,000 Units, Subcutaneous, Q8H YENY  hydroCHLOROthiazide, 12.5 mg, Oral, Daily  insulin lispro, 1-6 Units, Subcutaneous, TID AC  insulin lispro, 1-6 Units, Subcutaneous, HS  lisinopril, 20 mg, Oral, Daily  mupirocin, 1 Application, Nasal, Q12H YENY  pantoprazole, 40 mg, Oral, Daily  polyethylene glycol, 17 g, Oral, Daily    Continuous IV Infusions:  EPINEPHrine 5,000 mcg (STANDARD CONCENTRATION) IV in sodium chloride 0.9% 250 mL, 1-10 mcg/min, Intravenous, Titrated  insulin regular 100 units in sodium chloride 0.9% 100 mL, 100 Units, Intravenous, Titrated  niCARdipine, 1-15 mg/hr, Intravenous, Titrated  phenylephrine (MAHSA-SYNEPHRINE) 50 mg (STANDARD CONCENTRATION) in sodium chloride 0.9% 250 mL,  mcg/min, Intravenous, Titrated    PRN Meds:  acetaminophen, 650 mg, Rectal, Q4H PRN  acetaminophen, 650 mg, Oral, Q4H PRN  bisacodyl, 10 mg, Rectal, Daily PRN  hydrALAZINE, 10 mg, Intravenous, Q6H PRN  ondansetron, 4 mg, Intravenous, Q6H PRN        Network Utilization Review Department  ATTENTION: Please call with any questions or concerns to [phone] and carefully listen to the prompts so that you are directed to the right person. All voicemails are confidential.   For Discharge needs, contact Care Management DC Support Team at [phone] opt. [phone]  Send all requests for admission clinical reviews, approved or denied determinations and any other requests to dedicated fax number below belonging to the campus where the patient is receiving treatment. List of dedicated fax numbers for the Facilities:  FACILITY NAME UR FAX NUMBER   ADMISSION DENIALS (Administrative/Medical Necessity) [phone]   DISCHARGE SUPPORT TEAM  (NETWORK) [phone]   PARENT CHILD HEALTH (Maternity/NICU/Pediatrics) [phone]   [facility] [phone]   [facility] [phone]   [facility] [phone]   [facility] [phone]   [facility] [phone]   [facility] [phone]   [facility] [phone]   [facility] [phone]   [facility] [phone]   [facility] [phone]   [facility] [phone]   [facility] [phone]

## 2024-04-17 NOTE — PHYSICAL THERAPY NOTE
Physical Therapy Evaluation     Patient's Name: Song Camargo    Admitting Diagnosis  Nonrheumatic aortic valve stenosis [I35.0]    Problem List  Patient Active Problem List   Diagnosis    Aortic valve stenosis    Benign essential hypertension    Family history of ischemic heart disease (IHD)    History of prostate cancer    Mixed hyperlipidemia    Nonrheumatic aortic valve stenosis    Coronary artery disease involving native coronary artery    Lumbar radiculopathy    S/P TAVR (transcatheter aortic valve replacement)    Daily consumption of alcohol       Past Medical History  Past Medical History:   Diagnosis Date    Arthritis     Basal cell carcinoma (BCC)     Coronary artery disease     High blood pressure     High cholesterol     Lumbar radiculopathy 4/15/2024    Prostate CA (HCC)        Past Surgical History  Past Surgical History:   Procedure Laterality Date    APPENDECTOMY      CARDIAC CATHETERIZATION N/A 3/13/2024    Procedure: Cardiac RHC/LHC;  Surgeon: Mir Sharpe MD;  Location: [facility];  Service: Cardiology    COLONOSCOPY  2014    MOHS SURGERY  2004    PROSTATECTOMY  2010    SHOULDER SURGERY Right     TONSILLECTOMY      VASECTOMY          04/17/24 0858   PT Last Visit   PT Visit Date 04/17/24   Note Type   Note type Evaluation  (and Tx)   Pain Assessment   Pain Assessment Tool 0-10   Pain Score No Pain   Restrictions/Precautions   Braces or Orthoses   (reports wearing the shoe w/ a steel plate but able to amb w/o it)   Other Precautions Cardiac/sternal;Telemetry   Home Living   Type of Home House   Home Layout One level  (3 JESSE w/ a handle for support)   Prior Function   Level of [place] Independent with functional mobility  (amb w/o AD)   Lives With Alone   Receives Help From Family;Friend(s)   General   Additional Pertinent History cleared for assessment by dolores   Cognition   Overall Cognitive Status WFL   Arousal/Participation Alert   Attention Within functional limits    Orientation Level Oriented to person;Oriented to place;Oriented to situation   Memory Within functional limits   Following Commands Follows one step commands without difficulty   Subjective   Subjective Alert; in the chair; agreeable to mobilize   RUE Assessment   RUE Assessment WFL  (AROM)   LUE Assessment   LUE Assessment WFL  (AROM)   RLE Assessment   RLE Assessment WFL  (AROM)   Strength RLE   RLE Overall Strength   (good -)   LLE Assessment   LLE Assessment WFL  (AROM)   Strength LLE   LLE Overall Strength   (good -)   Transfers   Sit to Stand 6  Modified independent   Stand to Sit 6  Modified independent   Ambulation/Elevation   Gait pattern Short stride;Inconsistent reddy  (no overt LOB, knee buckling or swaying)   Gait Assistance 6  Modified independent   Assistive Device None   Distance 50 ft + 230 ft w/ steps negotiation in between   Stair Management Assistance 5  Supervision   Additional items Verbal cues  (reviewed sequencing)   Stair Management Technique One rail R;Foreward;Backward;Nonreciprocal   Number of Stairs 4  (1 step x 2 trials + 2 steps)   Balance   Static Sitting Good   Dynamic Sitting Fair +   Static Standing Fair +   Dynamic Standing Fair   Ambulatory Fair   Activity Tolerance   Activity Tolerance Patient tolerated treatment well   Medical Staff Made Aware Co-eval performed w/ OTR due to complexity of medical status   Nurse Made Aware spoke to SHANNAN Matta   Assessment   Prognosis Good   Problem List Decreased strength;Decreased endurance;Impaired balance;Decreased mobility   Assessment Pt is 78 y.o. male admitted with Dx of Nonrheumatic aortic valve stenosis and underwent Transcatheter aortic valve replacement with a 23 mm Wu REGINE 3 Ultra Resilia bioprosthetic valve via a percutaneous left transfemoral approach on 4/16/2024. Pt 's comorbidities affecting POC include: Arthritis, Coronary artery disease, High blood pressure, and Lumbar radiculopathy and personal factors of: living  alone and JESSE. Pt's clinical presentation is currently  unstable/unpredictable which is evident in ongoing telem monitoring and ongoing postop management. Pt presents w/ min postop guarding, min overall weakness, min decreased endurance and min inconsistent amb balance and gait patterns w/ overall observed mobility status one level surfaces and elevations appearing to be near premorbid level. Will cont to follow pt in PT for progressive mobilization to max level of endurance and safety. D/C recommendations are outlined below. Will cont to follow until then.   Goals   Patient Goals to return home   STG Expiration Date 04/22/24   Short Term Goal #1 3-5 days. Pt will amb 400 ft w/o assistive device, (I) in order to facilitate safe return to community amb status. Pt will negotiate 3 steps w/ hand rail, mod (I) in order to assure safe navigation in and out of the premorbid living environment. Pt will achieve mod (I) level w/ bed mob in order to facilitate safety with OOB and back to bed transitions in own living environment. Pt will participate in LE therex and balance activities to max progression w/ mobility skills.   PT Treatment Day 0   Plan   Treatment/Interventions LE strengthening/ROM;Elevations;Therapeutic exercise;Endurance training;Bed mobility;Gait training;Spoke to nursing;Spoke to case management;OT   PT Frequency 3-5x/wk   Discharge Recommendation   Rehab Resource Intensity Level, PT No post-acute rehabilitation needs   AM-PAC Basic Mobility Inpatient   Turning in Flat Bed Without Bedrails 4   Lying on Back to Sitting on Edge of Flat Bed Without Bedrails 4   Moving Bed to Chair 4   Standing Up From Chair Using Arms 4   Walk in Room 4   Climb 3-5 Stairs With Railing 3   Basic Mobility Inpatient Raw Score 23   Basic Mobility Standardized Score 50.88   [facility] Highest Level Of Mobility   -HLM Goal 7: Walk 25 feet or more   -HLM Achieved 8: Walk 250 feet ot more   Modified [place] Scale   Modified  [place] Scale 2   Additional Treatment Session   Start Time 0900   End Time 0909   Treatment Assessment Additional follow up consecutive session performed to initiate LE therex in order to max strength and to facilitate progression w/ functional mobility skills and overall level of (I) and for HEP instructions.   Additional Treatment Day 1   Exercises   Knee AROM Long Arc Quad Sitting;10 reps;AROM;Bilateral  (2 sets)   Ankle Pumps Sitting;10 reps;AROM;Bilateral  (2 sets)   Marching Sitting;AROM;Bilateral;5 reps  (2 sets)   End of Consult   Patient Position at End of Consult Bedside chair;All needs within reach           Wm العراقي, PT

## 2024-04-17 NOTE — PROGRESS NOTES
"Patient bladder scanned with slight urge to urinate but unable.  Patient stated \"You are not putting a catheter back in me.  I will go eventually.  I have a history of prostate troubles.\"  "

## 2024-04-17 NOTE — PLAN OF CARE
Problem: Prexisting or High Potential for Compromised Skin Integrity  Goal: Skin integrity is maintained or improved  Description: INTERVENTIONS:  - Identify patients at risk for skin breakdown  - Assess and monitor skin integrity  - Assess and monitor nutrition and hydration status  - Monitor labs   - Assess for incontinence   - Turn and reposition patient  - Assist with mobility/ambulation  - Relieve pressure over bony prominences  - Avoid friction and shearing  - Provide appropriate hygiene as needed including keeping skin clean and dry  - Evaluate need for skin moisturizer/barrier cream  - Collaborate with interdisciplinary team   - Patient/family teaching  - Consider wound care consult   4/17/2024 1310 by Jacinta Funes RN  Outcome: Adequate for Discharge  4/17/2024 0756 by Jacinta Funes, RN  Outcome: Progressing

## 2024-04-17 NOTE — CONSULTS
Consultation - Geriatrics   Song Camargo 78 y.o. male MRN: [id number]  Unit/Bed#: [facility] 409-01 Encounter: [id number]      Assessment/Plan  Aortic stenosis  S/p TAVR  CT surgery managing    Frailty   Clinical Frail Scale: 4- Vulnerable  Not dependent for daily help, symptoms limit activity  PT/OT  Albumin 4.0 (4/9/24)  Keep physically, mentally and socially active    Hypertension  Taking amlodipine 2.5 mg PO daily, hydrochlorothiazide 12.5 mg PO daily, lisinopril 20 mg PO daily  BP controlled at 115/69    Coronary Artery Disease  Taking aspirin 81 mg PO daily and atorvastatin 40 mg PO daily    Cognitive screening  No reported memory loss  Recommend check TSH and Vitamin B12  maintain neuro protective lifestyle :   Keep physically, mentally and socially active   Lower BMI   Increase physical exercise   Recommend Mediterranean/MIND diet   Monitor good control of blood pressure, blood sugar and cholestrerol    Fall prevention  At risk for falls secondary to age, medications, vision impairment  Fall precautions  Keep physically active  Recommend fall prevention/ balance training such as Matter of Balance or Donato Chi or yoga  Fall prevention handout given from cdc.gov/steadi    Insomnia  Related to hospitalization  Maintain circadian rhythm     Delirium precautions  Alert and oriented x 3  At risk secondary to age, hospitalization, medications, insomnia  Avoid deliriogenic medications such as tramadol, benzodiazepines, anticholinergics,  Benadryl  Treat pain, See geriatric pain protocol  Monitor for constipation and urinary retention  Encourage early and frequent moblization, OOB  Encourage Hydration/ Nutrition  Implement sleep hygiene, limit night time interuptions, group activities    Advanced care planning  Full code                  History of Present Illness   Physician Requesting Consult: Jacob Macias DO  Reason for Consult / Principal Problem: aortic stenosis  Hx and PE limited by: NA  HPI: Song Camargo is a  78 y.o. year old male who presents with aortic stenosis. He is admitted for transcatheter aortic stenosis.     He has arthritis, CAD, HTN, hypercholesterolemia, hx of BCC and prostate cancer.    Prior to arrival he lives alone at home. He has supportive sons who he can reach out to when needed. He currently drives. He wears glasses. He does not use hearing aids or dentures. He reports no falls in the last six months. He does not use any assistive device to ambulate.     Today, he is alert and awake. He is laying in bed. He is active and engaging in conversation. He slept poorly last night due to overnight interruptions. He does not report fatigue or sleepiness. He ambulated with PT today, without reports of dizziness, lightheadedness, SOB with exertion, activity intolerance, or chest pain. He has been urinating to day. His last BM was yesterday. His appetite is good.    Inpatient consult to Gerontology  Consult performed by: NIR Small  Consult ordered by: Devin Cassidy PA-C          Review of Systems   Constitutional:  Negative for activity change and appetite change.   HENT:  Negative for hearing loss, sinus pain and sore throat.    Eyes:  Negative for pain and redness.   Respiratory:  Negative for cough, chest tightness, shortness of breath and wheezing.    Cardiovascular:  Negative for chest pain, palpitations and leg swelling.   Gastrointestinal:  Positive for abdominal distention. Negative for abdominal pain and constipation.   Endocrine: Negative for polydipsia and polyphagia.   Genitourinary:  Negative for dysuria, frequency and hematuria.   Musculoskeletal:  Negative for arthralgias, back pain and gait problem.   Neurological:  Negative for dizziness, weakness, light-headedness and headaches.   Psychiatric/Behavioral:  Negative for behavioral problems, confusion and decreased concentration.        Historical Information   Past Medical History:   Diagnosis Date    Arthritis     Basal cell  carcinoma (BCC)     Coronary artery disease     High blood pressure     High cholesterol     Lumbar radiculopathy 4/15/2024    Prostate CA (HCC)      Past Surgical History:   Procedure Laterality Date    APPENDECTOMY      CARDIAC CATHETERIZATION N/A 3/13/2024    Procedure: Cardiac RHC/LHC;  Surgeon: Mir Sharpe MD;  Location: [facility];  Service: Cardiology    COLONOSCOPY  2014    MOHS SURGERY  2004    PROSTATECTOMY  2010    SHOULDER SURGERY Right     TONSILLECTOMY      VASECTOMY       Social History   Social History     Substance and Sexual Activity   Alcohol Use Yes    Alcohol/week: 8.0 standard drinks of alcohol    Types: 8 Glasses of wine per week    Comment: drinks one glass of wine on the weekdays and maybe two on the weekend     Social History     Substance and Sexual Activity   Drug Use Never     Social History     Tobacco Use   Smoking Status Former    Current packs/day: 0.00    Types: Cigarettes    Quit date: 2004    Years since quittin.0   Smokeless Tobacco Never         Family History:   Family History   Problem Relation Age of Onset    Sudden death Mother         cardiac    Hypertension Father     Coronary artery disease Father     Cancer Father     Hyperlipidemia Father        Meds/Allergies   Current meds:   Current Facility-Administered Medications   Medication Dose Route Frequency    acetaminophen (TYLENOL) rectal suppository 650 mg  650 mg Rectal Q4H PRN    acetaminophen (TYLENOL) tablet 650 mg  650 mg Oral Q4H PRN    amLODIPine (NORVASC) tablet 2.5 mg  2.5 mg Oral Daily    aspirin chewable tablet 81 mg  81 mg Oral Daily    atorvastatin (LIPITOR) tablet 40 mg  40 mg Oral Daily    bisacodyl (DULCOLAX) rectal suppository 10 mg  10 mg Rectal Daily PRN    ceFAZolin (ANCEF) IVPB (premix in dextrose) 2,000 mg 50 mL  2,000 mg Intravenous Q8H    chlorhexidine (PERIDEX) 0.12 % oral rinse 15 mL  15 mL Mouth/Throat BID    clopidogrel (PLAVIX) tablet 75 mg  75 mg Oral Daily     "EPINEPHrine 5,000 mcg (STANDARD CONCENTRATION) IV in sodium chloride 0.9% 250 mL  1-10 mcg/min Intravenous Titrated    heparin (porcine) subcutaneous injection 5,000 Units  5,000 Units Subcutaneous Q8H YENY    hydrALAZINE (APRESOLINE) injection 10 mg  10 mg Intravenous Q6H PRN    hydroCHLOROthiazide tablet 12.5 mg  12.5 mg Oral Daily    insulin lispro (HumALOG/ADMELOG) 100 units/mL subcutaneous injection 1-6 Units  1-6 Units Subcutaneous TID AC    insulin lispro (HumALOG/ADMELOG) 100 units/mL subcutaneous injection 1-6 Units  1-6 Units Subcutaneous HS    insulin regular 100 units in sodium chloride 0.9% 100 mL  100 Units Intravenous Titrated    lisinopril (ZESTRIL) tablet 20 mg  20 mg Oral Daily    mupirocin (BACTROBAN) 2 % nasal ointment 1 Application  1 Application Nasal Q12H YENY    niCARdipine (CARDENE) 25 mg (STANDARD CONCENTRATION) in sodium chloride 0.9% 250 mL  1-15 mg/hr Intravenous Titrated    ondansetron (ZOFRAN) injection 4 mg  4 mg Intravenous Q6H PRN    pantoprazole (PROTONIX) EC tablet 40 mg  40 mg Oral Daily    phenylephrine (MAHSA-SYNEPHRINE) 50 mg (STANDARD CONCENTRATION) in sodium chloride 0.9% 250 mL   mcg/min Intravenous Titrated    polyethylene glycol (MIRALAX) packet 17 g  17 g Oral Daily      Current PTA meds:  Medications Prior to Admission   Medication    amLODIPine (NORVASC) 2.5 mg tablet    aspirin 81 mg chewable tablet    atorvastatin (LIPITOR) 40 mg tablet    hydroCHLOROthiazide 12.5 mg tablet    lisinopril (ZESTRIL) 20 mg tablet    mupirocin (BACTROBAN) 2 % ointment        No Known Allergies    Objective   Vitals: Blood pressure 115/69, pulse 75, temperature 98.3 °F (36.8 °C), temperature source Oral, resp. rate 16, height 5' 9\" (1.753 m), weight 82.2 kg (181 lb 3.5 oz), SpO2 96%.,Body mass index is 26.76 kg/m².      Physical Exam  Vitals and nursing note reviewed.   Constitutional:       General: He is not in acute distress.     Appearance: Normal appearance. He is not " ill-appearing.   HENT:      Head: Normocephalic and atraumatic.      Right Ear: External ear normal.      Left Ear: External ear normal.      Nose: Nose normal. No congestion or rhinorrhea.      Mouth/Throat:      Mouth: Mucous membranes are moist.      Pharynx: Oropharynx is clear.   Eyes:      General:         Right eye: No discharge.         Left eye: No discharge.      Extraocular Movements: Extraocular movements intact.      Conjunctiva/sclera: Conjunctivae normal.      Pupils: Pupils are equal, round, and reactive to light.   Cardiovascular:      Rate and Rhythm: Normal rate and regular rhythm.      Heart sounds: No murmur heard.     No gallop.   Pulmonary:      Effort: Pulmonary effort is normal. No respiratory distress.      Breath sounds: Normal breath sounds. No rhonchi or rales.   Chest:      Chest wall: No tenderness.   Abdominal:      General: Bowel sounds are normal. There is distension.      Palpations: Abdomen is soft.      Tenderness: There is no abdominal tenderness.   Musculoskeletal:         General: No tenderness. Normal range of motion.      Cervical back: Normal range of motion.      Right lower leg: No edema.      Left lower leg: No edema.   Skin:     General: Skin is warm and dry.      Capillary Refill: Capillary refill takes less than 2 seconds.      Coloration: Skin is not pale.   Neurological:      General: No focal deficit present.      Mental Status: He is alert and oriented to person, place, and time.      Gait: Gait normal.   Psychiatric:         Mood and Affect: Mood normal.         Behavior: Behavior normal.         Thought Content: Thought content normal.         Judgment: Judgment normal.         Lab Results:   Results from last 7 days   Lab Units 04/17/24  0432   WBC Thousand/uL 10.04   HEMOGLOBIN g/dL 13.8   HEMATOCRIT % 40.8   PLATELETS Thousands/uL 188        Results from last 7 days   Lab Units 04/17/24  0432 04/16/24  1249 04/16/24  1216   POTASSIUM mmol/L 3.9   < >  --     CHLORIDE mmol/L 106   < >  --    CO2 mmol/L 28   < >  --    CO2, I-STAT mmol/L  --   --  24   BUN mg/dL 16   < >  --    CREATININE mg/dL 0.74   < >  --    CALCIUM mg/dL 8.3*   < >  --    GLUCOSE, ISTAT mg/dl  --   --  102    < > = values in this interval not displayed.       Imaging Studies: I have personally reviewed pertinent films in PACS  EKG, Pathology, and Other Studies: I have personally reviewed pertinent films in PACS  VTE Prophylaxis: Sequential compression device (Venodyne)     Code Status: Level 1 - Full Code

## 2024-04-17 NOTE — INCIDENTAL FINDINGS
The following findings require follow up:  Non-Radiographic finding   Finding: friable posterior tongue during anesthesia    Follow up required: yes   Follow up should be done with ENT for evaluation    Please notify the following clinician to assist with the follow up: ENT outpatient

## 2024-04-17 NOTE — DISCHARGE INSTR - AVS FIRST PAGE
Take Plavix 75 mg daily and Aspirin 81 mg daily x 90 days then take aspirin 81 mg daily thereafter.       Transcatheter Aortic Valve Replacement (TAVR)    What you need to know about a TAVR:     A TAVR is a procedure to replace your aortic valve. It is done without removing your old valve. The aortic valve opens and closes to let blood flow from your heart to the rest of your body.   Your valve has been replaced with a tissue valve. The tissue has been made from the lining that surrounds the heart of a cow.    Recovering from surgery:     There may be bruising or pain in your groin, where the valve was inserted. You may take over the counter acetaminophen (Tylenol) as needed for discomfort.  Your incision is sealed with surgical glue. This will fall off after a few weeks. Do not pick this off.   Do not drive for two weeks  Do not lift over 25 pounds for two weeks.   Shower every day. Keep your incision dry. Do not apply lotions, powders, or ointments to your incision.    Blood thinners after surgery:     You have been prescribed blood thinners to help prevent blood clots. Blood clots can cause strokes, heart attacks, and death.   While taking any blood thinner, watch for bleeding and bruising. Watch for blood in your urine and bowel movements. Use a soft washcloth on your skin, and a soft toothbrush to brush your teeth. If you shave, use an electric shaver. Do not play contact sports.    Do not start or stop any other medicines unless your healthcare provider tells you to do so. (Many medicines cannot be used with blood thinners)    Take your blood thinner exactly as prescribed by your healthcare provider. Do not skip a dose or take less than prescribed. Tell your provider right away if you forget to take your blood thinner, or if you took too much.    Call your doctor if:     You develop any bleeding from the incisions in your groin.   Your leg feels numb, cool, or looks pale.   You feel dizzy or lightheaded  Your  groin puncture is red, swollen, or draining pus.  Your groin puncture looks more bruised/swollen or you have new bruising on the side of your leg.   You have nausea or are vomiting.    Antibiotic Prophylaxis:     After TAVR, you are at an increased risk for developing a valve infection with many common dental procedures. Bacteria that normally lives in your mouth can cross into your bloodstream with any dental work (even cleanings). The immune system normally kills these bacteria, but antibiotic prophylaxis provides extra protection.   Inform your dentist that you have had a TAVR  Do not schedule routine dental cleaning for six months following surgery.   Antibiotics will be prescribed by your dentist, prior to your procedure

## 2024-04-17 NOTE — PROGRESS NOTES
Progress Note - Cardiothoracic Surgery   Song Camargo 78 y.o. male MRN: [id number]  Unit/Bed#: [facility] 409-01 Encounter: [id number]    Aortic stenosis, Non-Rheumatic. S/P transfemoral transcatheter aortic valve replacement; POD # 1    24 Hour Events: No events overnight.  No complaints.  Tolerated diet this morning no issues.  Ambulated well.     Medications:   Scheduled Meds:  Current Facility-Administered Medications   Medication Dose Route Frequency Provider Last Rate    acetaminophen  650 mg Rectal Q4H PRN Devin Cassidy PA-C      acetaminophen  650 mg Oral Q4H PRN Devin Cassidy PA-C      amLODIPine  2.5 mg Oral Daily Devin Cassidy PA-C      aspirin  81 mg Oral Daily Devin Cassidy PA-C      atorvastatin  40 mg Oral Daily Devin Cassidy PA-C      bisacodyl  10 mg Rectal Daily PRN Devin Cassidy PA-C      cefazolin  2,000 mg Intravenous Q8H Devin Cassidy PA-C Stopped (04/17/24 0405)    chlorhexidine  15 mL Mouth/Throat BID Devin Cassidy PA-C      clopidogrel  75 mg Oral Daily Devin Cassidy PA-C      EPINEPHrine 5,000 mcg (STANDARD CONCENTRATION) IV in sodium chloride 0.9% 250 mL  1-10 mcg/min Intravenous Titrated Jacob Macias DO      heparin (porcine)  5,000 Units Subcutaneous Q8H [facility] Devin Cassidy PA-C      hydrALAZINE  10 mg Intravenous Q6H PRN Devin Cassidy PA-C      hydroCHLOROthiazide  12.5 mg Oral Daily Devin Cassidy PA-C      insulin lispro  1-6 Units Subcutaneous TID AC Devin Cassidy PA-C      insulin lispro  1-6 Units Subcutaneous HS Devin Cassidy PA-C      insulin regular 100 units in sodium chloride 0.9% 100 mL  100 Units Intravenous Titrated Jacob Macias DO      lisinopril  20 mg Oral Daily Devin Cassidy PA-C      mupirocin  1 Application Nasal Q12H [facility] Devin Cassidy PA-C      niCARdipine  1-15 mg/hr Intravenous Titrated Devin Cassidy PA-C 1 mg/hr (04/16/24 1915)    ondansetron  4 mg Intravenous Q6H PRN Devin Cassidy PA-C      pantoprazole  40 mg Oral Daily Devin  GIULIANO Cassidy      phenylephrine (MAHSA-SYNEPHRINE) 50 mg (STANDARD CONCENTRATION) in sodium chloride 0.9% 250 mL   mcg/min Intravenous Titrated Jacob Macias DO      polyethylene glycol  17 g Oral Daily Devin Cassidy PA-C       Continuous Infusions:EPINEPHrine 5,000 mcg (STANDARD CONCENTRATION) IV in sodium chloride 0.9% 250 mL, 1-10 mcg/min  insulin regular 100 units in sodium chloride 0.9% 100 mL, 100 Units  niCARdipine, 1-15 mg/hr, Last Rate: 1 mg/hr (04/16/24 1915)  phenylephrine (MAHSA-SYNEPHRINE) 50 mg (STANDARD CONCENTRATION) in sodium chloride 0.9% 250 mL,  mcg/min      PRN Meds:.  acetaminophen    acetaminophen    bisacodyl    hydrALAZINE    ondansetron    Vitals:   Vitals:    04/17/24 0600 04/17/24 0700 04/17/24 0710 04/17/24 0801   BP: 128/66 134/68 141/70    BP Location: Right arm Right arm Right arm    Pulse: 66 72 75 76   Resp: 18 16 18    Temp:   98.3 °F (36.8 °C)    TempSrc:   Oral    SpO2: 98% 96% 97%    Weight: 82.2 kg (181 lb 3.5 oz)      Height:           Telemetry: NSR; Heart Rate: 66    Respiratory:   SpO2: SpO2: 97 %; Room Air    Intake/Output:     Intake/Output Summary (Last 24 hours) at 4/17/2024 0846  Last data filed at 4/17/2024 0812  Gross per 24 hour   Intake 1760 ml   Output 2050 ml   Net -290 ml        Weights:   Weight (last 2 days)       Date/Time Weight    04/17/24 0600 82.2 (181.22)    04/16/24 0853 81.6 (179.9)          Results:   Results from last 7 days   Lab Units 04/17/24  0432 04/16/24  1253 04/16/24  1249 04/16/24  1216   WBC Thousand/uL 10.04  --   --   --    HEMOGLOBIN g/dL 13.8  --  14.4  --    I STAT HEMOGLOBIN g/dl  --   --   --  11.2*   HEMATOCRIT % 40.8  --  41.7  --    HEMATOCRIT, ISTAT %  --   --   --  33*   PLATELETS Thousands/uL 188 187 200  --      Results from last 7 days   Lab Units 04/17/24  0432 04/16/24  1249 04/16/24  1216   POTASSIUM mmol/L 3.9 3.7  --    CHLORIDE mmol/L 106 108  --    CO2 mmol/L 28 26  --    CO2, I-STAT mmol/L  --   --  24    BUN mg/dL 16 13  --    CREATININE mg/dL 0.74 0.70  --    GLUCOSE, ISTAT mg/dl  --   --  102   CALCIUM mg/dL 8.3* 8.2*  --          Point of care glucose: 102-168    Studies:    ECG: NSR rate of 68    CXR: line in position, stable left diaphragm elevation, bases clear     Echocardiogram: not yet completed     I have personally reviewed pertinent reports.      Invasive Lines/Tubes:  Invasive Devices       Central Venous Catheter Line  Duration             CVC Central Lines 04/16/24 Right Internal jugular <1 day              Peripheral Intravenous Line  Duration             Peripheral IV 03/26/24 Distal;Right;Upper;Ventral (anterior) Arm 21 days    Peripheral IV 04/16/24 Right Antecubital <1 day    Peripheral IV 04/16/24 Right Wrist <1 day                    Physical Exam:    General: No acute distress, Alert, and Normal appearance  HEENT/NECK:  Normocephalic. Atraumatic.  No jugular venous distention.    Cardiac: Regular rate and rhythm  Pulmonary:  Breath sounds clear bilaterally  Abdomen:  Non-tender, Non-distended, and Normal bowel sounds  Incisions: Groin puncture sites dressed with sterile dressing.  No hematoma, erythema, or drainage  Extremities: Extremities warm/dry and DP pulses 1+   bilaterally  Neuro: Alert and oriented X 3  Skin: Warm/Dry, without rashes or lesions.    Assessment:  Principal Problem:    Nonrheumatic aortic valve stenosis  Active Problems:    Benign essential hypertension    History of prostate cancer    Mixed hyperlipidemia    Coronary artery disease involving native coronary artery    Lumbar radiculopathy    S/P TAVR (transcatheter aortic valve replacement)    Daily consumption of alcohol       Aortic stenosis, Non-Rheumatic. S/P transfemoral transcatheter aortic valve replacement; POD # 1    Plan:    Cardiac:     Normal ventricular systolic function, EF 60%    NSR; HR well-controlled   Home medications resumed: norvasc 2.5 mg daily, HCTZ 12.5 mg daily and lisinopril 20 mg daily      TAVR anticoagulation regimen: ASA/Plavix  Postoperative transthoracic echocardiogram:  Echo to be completed today    Continue Statin therapy    Central IV access no longer required; Remove central venous catheter today    Continue Subcutaneous Heparin for DVT prophylaxis    Pulmonary:     Good Room air oxygen saturation; Continue incentive spirometry/Coughing/Deep breathing exercises    Renal:     Post op Creatinine stable; Follow up labs prn     Intake/Output net: -290 mL/24 hours  Low dose of diuretics for a few days     Neuro:    Neurologically intact; No active issues     Incisional pain well-controlled; Continue prn Tylenol    GI:  Tolerating diet without complaint    Continue stool softeners and prn suppository    Continue GI prophylaxis    Endo:     Glucose well-controlled with insulin sliding scale coverage    7.  Hematology:     Post-operative blood count acceptable; Trend prn    8.  Disposition:    Gerontology consultation ordered for routine assessment of TAVR patient over 65 years old    Anticipated discharge date: potentially today pending echo report        VTE Pharmacologic Prophylaxis: Heparin  VTE Mechanical Prophylaxis: sequential compression device    Collaborative rounds completed with supervising physician  Plan of care discussed with bedside nurse    SIGNATURE: Jessy Wan PA-C  DATE: April 17, 2024  TIME: 8:46 AM

## 2024-04-17 NOTE — OCCUPATIONAL THERAPY NOTE
Occupational Therapy Evaluation     Patient Name: Song Camargo  Today's Date: 4/17/2024  Problem List  Principal Problem:    Nonrheumatic aortic valve stenosis  Active Problems:    Benign essential hypertension    History of prostate cancer    Mixed hyperlipidemia    Coronary artery disease involving native coronary artery    Lumbar radiculopathy    S/P TAVR (transcatheter aortic valve replacement)    Daily consumption of alcohol    Past Medical History  Past Medical History:   Diagnosis Date    Arthritis     Basal cell carcinoma (BCC)     Coronary artery disease     High blood pressure     High cholesterol     Lumbar radiculopathy 4/15/2024    Prostate CA (HCC)      Past Surgical History  Past Surgical History:   Procedure Laterality Date    APPENDECTOMY      CARDIAC CATHETERIZATION N/A 3/13/2024    Procedure: Cardiac RHC/LHC;  Surgeon: Mir Sharpe MD;  [facility];  Service: Cardiology    COLONOSCOPY  2014    MOHS SURGERY  2004    PROSTATECTOMY  2010    SHOULDER SURGERY Right     TONSILLECTOMY      VASECTOMY           04/17/24 0859   OT Last Visit   OT Visit Date 04/17/24   Note Type   Note type Evaluation   Additional Comments Pt seen w/ PT to increase safety, decrease fall risk, and maximize functional/occupational performance 2* medical complecxity which is a regression from pt's functional baseline.   Pain Assessment   Pain Assessment Tool 0-10   Pain Score No Pain   Hospital Pain Intervention(s) Repositioned;Ambulation/increased activity   Restrictions/Precautions   Weight Bearing Precautions Per Order No   Other Precautions Cardiac/sternal;Multiple lines;Telemetry   Home Living   Type of Home House  (1 story home with 3 JESSE)   Home Layout One level;Performs ADLs on one level;Able to live on main level with bedroom/bathroom;Access   Bathroom Accessibility Accessible   Home Equipment Other (Comment)  (No DME)   Additional Comments Pt reports living alone in a 1 story home with 3  "JESSE in [place] community; no DME PTA   Prior Function   Level of [place] Independent with ADLs;Independent with functional mobility;Independent with IADLS   Lives With Alone   Receives Help From Family;Friend(s)   IADLs Independent with driving;Independent with meal prep;Independent with medication management   Falls in the last 6 months 0   Vocational Retired   Comments (+) driving   Lifestyle   Autonomy PTA, pt was independent in ADLs/IADLs and uses no DME for functional mobility; (+) driving   Reciprocal Relationships Lives alone; supportive friends nearby   Service to Others Retired; reports previously working in [employer]   Intrinsic Gratification Enjoys [place] [employer] and learning about ancestory   Subjective   Subjective \"I have friends nearby that can help out.\"   ADL   Where Assessed Chair   Eating Assistance 7  Independent   Grooming Assistance 7  Independent   UB Bathing Assistance 5  Supervision/Setup   LB Bathing Assistance 5  Supervision/Setup   UB Dressing Assistance 5  Supervision/Setup   LB Dressing Assistance 5  Supervision/Setup   Toileting Assistance  5  Supervision/Setup   Functional Assistance 5  Supervision/Setup   Bed Mobility   Supine to Sit Unable to assess   Sit to Supine Unable to assess   Additional Comments Upon arrival, pt found sitting upright in recliner; @ end of session, pt left sitting upright in recliner with all functional needs in reach   Transfers   Sit to Stand 5  Supervision   Additional items Increased time required   Stand to Sit 5  Supervision   Additional items Increased time required   Additional Comments w/ no DME   Functional Mobility   Functional Mobility 5  Supervision   Additional Comments Pt completed household functional mobility distances @ supervision level w/ no DME   Balance   Static Sitting Normal   Dynamic Sitting Good   Static Standing Fair +   Dynamic Standing Fair   Ambulatory Fair   Activity Tolerance   Activity Tolerance Patient tolerated " "treatment well   Medical Staff Made Aware MODESTO Burk   Nurse Made Aware RN cleared   RUE Assessment   RUE Assessment WFL   LUE Assessment   LUE Assessment WFL   Hand Function   Gross Motor Coordination Functional   Fine Motor Coordination Functional   Cognition   Overall Cognitive Status WFL   Arousal/Participation Alert;Responsive;Arousable;Cooperative   Attention Within functional limits   Orientation Level Oriented X4   Memory Within functional limits   Following Commands Follows all commands and directions without difficulty   Comments Pt pleasant and cooperative   Assessment   Prognosis Fair   Assessment Pt is a 77 yo male admitted to [facility] s/p \"TAVR\" on 4/16 2* nonrheumatic aortic valve stenosis. Pt  has a past medical history of Arthritis, Basal cell carcinoma (BCC), Coronary artery disease, High blood pressure, High cholesterol, Lumbar radiculopathy (4/15/2024), and Prostate CA (HCC). Pt with active OT orders and OT consulted to assess pt's functional status and occupational performance to determine safe d/c needs. Pt seen with PT to increase safety, decrease fall risk, and maximize functional/occupational performance 2* medical complexity which is a regression from pt's functional baseline. Pt lives alone in a 1 story home with 3 JESSE. PTA, pt was independent in ADLs/IADLs and uses no DME for functional mobility. (+) driving. Discussed role and scope of OT in which pt was receptive. At this time, pt is not demonstrating any significant occupational deficits and is functioning at a level of supervision for functional transfers/mobility w/ no DME and UB/LB ADLs. From an OT standpoint, recommend discharge to home with increased support once medically stable. Pt was receptive regarding cardiac education on returning home safely with energy conservation techniques and demonstrated good carryover during occupational/functional performance. At this time, pt demonstrates good insight/safety awareness and does not " express any concerns regarding performing ADL/IADL/functional mobility tasks. No further skilled acute care OT services are needed at this time. The patient's raw score on the AM-PAC Daily Activity Inpatient Short Form is 20. A raw score of greater than or equal to 19 suggests the patient may benefit from discharge to home. Please refer to the recommendation of the Occupational Therapist for safe discharge planning.  Recommend continued engagement in ADL/functional mobility tasks with nursing and restorative therapy staff as appropriate to promote the highest level of independence prior to discharge. OT is discharging pt from caseload at this time, please reconsult if needed.   Goals   Patient Goals To go home   Plan   OT Frequency Eval only   Discharge Recommendation   Rehab Resource Intensity Level, OT No post-acute rehabilitation needs   AM-PAC Daily Activity Inpatient   Lower Body Dressing 3   Bathing 3   Toileting 3   Upper Body Dressing 3   Grooming 4   Eating 4   Daily Activity Raw Score 20   Daily Activity Standardized Score (Calc for Raw Score >=11) 42.03   AM-PAC Applied Cognition Inpatient   Following a Speech/Presentation 4   Understanding Ordinary Conversation 4   Taking Medications 4   Remembering Where Things Are Placed or Put Away 4   Remembering List of 4-5 Errands 4   Taking Care of Complicated Tasks 4   Applied Cognition Raw Score 24   Applied Cognition Standardized Score 62.21   End of Consult   Education Provided Yes   Patient Position at End of Consult Bedside chair;All needs within reach   Nurse Communication Nurse aware of consult       Linnette Mcgarry MS, OTR/L

## 2024-04-17 NOTE — CONSULTS
Consultation - Geriatrics   Song Camargo 78 y.o. male MRN: [id number]  Unit/Bed#: [facility] 409-01 Encounter: [id number]      Assessment/Plan  Aortic stenosis  S/p TAVR  CT surgery managing    Frailty   Clinical Frail Scale: 4- Vulnerable  Not dependent for daily help, symptoms limit activity  PT/OT  Albumin 4.0 (4/9/24), maintain protein in diet  Keep physically, mentally and socially active    Hypertension  Taking amlodipine 2.5 mg PO daily, hydrochlorothiazide 12.5 mg PO daily, lisinopril 20 mg PO daily  BP controlled at 115/69     Coronary Artery Disease  Taking aspirin 81 mg PO daily and atorvastatin 40 mg PO daily     Cognitive screening  No reported memory loss  Recommend check TSH and Vitamin B12  maintain neuro protective lifestyle :   Keep physically, mentally and socially active   Lower BMI   Increase physical exercise   Recommend Mediterranean/MIND diet   Monitor good control of blood pressure, blood sugar and cholestrerol    Fall prevention  At risk for falls secondary to age, medications, vision impairment  Fall precautions  Keep physically active  Recommend fall prevention/ balance training such as Matter of Balance or Donato Chi or yoga  Fall prevention handout given from cdc.gov/steadi    Insomnia  Related to hospitalization  Maintain circadian rhythm     Delirium precautions  Alert and oriented x 3  At risk secondary to age, hospitalization, medications, insomnia   Avoid deliriogenic medications such as tramadol, benzodiazepines, anticholinergics,  Benadryl  Treat pain, See geriatric pain protocol  Monitor for constipation and urinary retention  Encourage early and frequent moblization, OOB  Encourage Hydration/ Nutrition  Implement sleep hygiene, limit night time interuptions, group activities    Advanced care planning  Full code                  History of Present Illness   Physician Requesting Consult: Jacob Macias DO  Reason for Consult / Principal Problem: aortic stenosis  Hx and PE limited by:  NA  HPI: Song Camargo is a 78 y.o. year old male who presents with aortic stenosis. He is admitted for transcatheter aortic stenosis.      He has arthritis, CAD, HTN, hypercholesterolemia, hx of BCC and prostate cancer.     Prior to arrival he lives alone at home. He has supportive sons who he can reach out to when needed. He currently drives. He wears glasses. He does not use hearing aids or dentures. He reports no falls in the last six months. He does not use any assistive device to ambulate.      Today, he is alert and awake. He is laying in bed. He is active and engaging in conversation. He slept poorly last night due to overnight interruptions. He does not report fatigue or sleepiness. He ambulated with PT today, without reports of dizziness, lightheadedness, SOB with exertion, activity intolerance, or chest pain. He has been urinating to day. His last BM was yesterday. His appetite is good.    Inpatient consult to Gerontology  Consult performed by: NIR Small  Consult ordered by: Devin Cassidy PA-C          Review of Systems   Constitutional:  Negative for activity change, appetite change and fatigue.   HENT:  Negative for hearing loss, sinus pain and sore throat.    Eyes:  Negative for pain and redness.   Respiratory:  Negative for cough, chest tightness and shortness of breath.    Cardiovascular:  Negative for chest pain, palpitations and leg swelling.   Gastrointestinal:  Positive for abdominal distention. Negative for abdominal pain and nausea.   Endocrine: Negative for polydipsia and polyphagia.   Genitourinary:  Negative for difficulty urinating, dysuria, frequency and hematuria.   Musculoskeletal:  Negative for arthralgias, back pain and gait problem.   Skin:  Negative for color change.   Neurological:  Negative for dizziness, weakness, light-headedness and headaches.   Psychiatric/Behavioral:  Negative for behavioral problems, confusion and decreased concentration.        Historical  Information   Past Medical History:   Diagnosis Date    Arthritis     Basal cell carcinoma (BCC)     Coronary artery disease     High blood pressure     High cholesterol     Lumbar radiculopathy 4/15/2024    Prostate CA (HCC)      Past Surgical History:   Procedure Laterality Date    APPENDECTOMY      CARDIAC CATHETERIZATION N/A 3/13/2024    Procedure: Cardiac RHC/LHC;  Surgeon: Mir Sharpe MD;  [facility];  Service: Cardiology    COLONOSCOPY      MOHS SURGERY  2004    PROSTATECTOMY  2010    SHOULDER SURGERY Right     TONSILLECTOMY      VASECTOMY       Social History   Social History     Substance and Sexual Activity   Alcohol Use Yes    Alcohol/week: 8.0 standard drinks of alcohol    Types: 8 Glasses of wine per week    Comment: drinks one glass of wine on the weekdays and maybe two on the weekend     Social History     Substance and Sexual Activity   Drug Use Never     Social History     Tobacco Use   Smoking Status Former    Current packs/day: 0.00    Types: Cigarettes    Quit date: 2004    Years since quittin.0   Smokeless Tobacco Never         Family History:   Family History   Problem Relation Age of Onset    Sudden death Mother         cardiac    Hypertension Father     Coronary artery disease Father     Cancer Father     Hyperlipidemia Father        Meds/Allergies   Current meds:   Current Facility-Administered Medications   Medication Dose Route Frequency    acetaminophen (TYLENOL) rectal suppository 650 mg  650 mg Rectal Q4H PRN    acetaminophen (TYLENOL) tablet 650 mg  650 mg Oral Q4H PRN    amLODIPine (NORVASC) tablet 2.5 mg  2.5 mg Oral Daily    aspirin chewable tablet 81 mg  81 mg Oral Daily    atorvastatin (LIPITOR) tablet 40 mg  40 mg Oral Daily    bisacodyl (DULCOLAX) rectal suppository 10 mg  10 mg Rectal Daily PRN    chlorhexidine (PERIDEX) 0.12 % oral rinse 15 mL  15 mL Mouth/Throat BID    clopidogrel (PLAVIX) tablet 75 mg  75 mg Oral Daily    EPINEPHrine 5,000  "mcg (STANDARD CONCENTRATION) IV in sodium chloride 0.9% 250 mL  1-10 mcg/min Intravenous Titrated    heparin (porcine) subcutaneous injection 5,000 Units  5,000 Units Subcutaneous Q8H YENY    hydrALAZINE (APRESOLINE) injection 10 mg  10 mg Intravenous Q6H PRN    hydroCHLOROthiazide tablet 12.5 mg  12.5 mg Oral Daily    insulin lispro (HumALOG/ADMELOG) 100 units/mL subcutaneous injection 1-6 Units  1-6 Units Subcutaneous TID AC    insulin lispro (HumALOG/ADMELOG) 100 units/mL subcutaneous injection 1-6 Units  1-6 Units Subcutaneous HS    insulin regular 100 units in sodium chloride 0.9% 100 mL  100 Units Intravenous Titrated    lisinopril (ZESTRIL) tablet 20 mg  20 mg Oral Daily    mupirocin (BACTROBAN) 2 % nasal ointment 1 Application  1 Application Nasal Q12H YENY    niCARdipine (CARDENE) 25 mg (STANDARD CONCENTRATION) in sodium chloride 0.9% 250 mL  1-15 mg/hr Intravenous Titrated    ondansetron (ZOFRAN) injection 4 mg  4 mg Intravenous Q6H PRN    pantoprazole (PROTONIX) EC tablet 40 mg  40 mg Oral Daily    phenylephrine (MAHSA-SYNEPHRINE) 50 mg (STANDARD CONCENTRATION) in sodium chloride 0.9% 250 mL   mcg/min Intravenous Titrated    polyethylene glycol (MIRALAX) packet 17 g  17 g Oral Daily      Current PTA meds:  Medications Prior to Admission   Medication    amLODIPine (NORVASC) 2.5 mg tablet    aspirin 81 mg chewable tablet    atorvastatin (LIPITOR) 40 mg tablet    hydroCHLOROthiazide 12.5 mg tablet    lisinopril (ZESTRIL) 20 mg tablet    mupirocin (BACTROBAN) 2 % ointment        No Known Allergies    Objective   Vitals: Blood pressure 131/69, pulse 72, temperature 98.4 °F (36.9 °C), temperature source Oral, resp. rate 18, height 5' 9\" (1.753 m), weight 82.1 kg (181 lb), SpO2 96%.,Body mass index is 26.73 kg/m².      Physical Exam  Vitals and nursing note reviewed.   Constitutional:       General: He is not in acute distress.     Appearance: Normal appearance. He is not ill-appearing.   HENT:      Head: " Normocephalic and atraumatic.      Right Ear: External ear normal.      Left Ear: External ear normal.      Nose: No congestion or rhinorrhea.      Mouth/Throat:      Mouth: Mucous membranes are moist.      Pharynx: Oropharynx is clear.   Eyes:      General:         Right eye: No discharge.         Left eye: No discharge.      Extraocular Movements: Extraocular movements intact.      Conjunctiva/sclera: Conjunctivae normal.      Pupils: Pupils are equal, round, and reactive to light.   Cardiovascular:      Rate and Rhythm: Normal rate and regular rhythm.      Pulses: Normal pulses.      Heart sounds: Normal heart sounds. No murmur heard.     No gallop.   Pulmonary:      Effort: Pulmonary effort is normal. No respiratory distress.      Breath sounds: Normal breath sounds. No wheezing or rhonchi.   Chest:      Chest wall: No tenderness.   Abdominal:      General: Abdomen is flat. There is distension.      Palpations: Abdomen is soft.      Tenderness: There is no abdominal tenderness.   Musculoskeletal:         General: No tenderness. Normal range of motion.      Cervical back: Normal range of motion.      Right lower leg: No edema.      Left lower leg: No edema.   Skin:     General: Skin is warm and dry.      Capillary Refill: Capillary refill takes less than 2 seconds.      Coloration: Skin is not pale.   Neurological:      General: No focal deficit present.      Mental Status: He is alert and oriented to person, place, and time.      Motor: No weakness.      Gait: Gait normal.   Psychiatric:         Mood and Affect: Mood normal.         Behavior: Behavior normal.         Thought Content: Thought content normal.         Judgment: Judgment normal.         Lab Results:   Results from last 7 days   Lab Units 04/17/24  0432   WBC Thousand/uL 10.04   HEMOGLOBIN g/dL 13.8   HEMATOCRIT % 40.8   PLATELETS Thousands/uL 188        Results from last 7 days   Lab Units 04/17/24  0432 04/16/24  1249 04/16/24  1216   POTASSIUM  mmol/L 3.9   < >  --    CHLORIDE mmol/L 106   < >  --    CO2 mmol/L 28   < >  --    CO2, I-STAT mmol/L  --   --  24   BUN mg/dL 16   < >  --    CREATININE mg/dL 0.74   < >  --    CALCIUM mg/dL 8.3*   < >  --    GLUCOSE, ISTAT mg/dl  --   --  102    < > = values in this interval not displayed.       Imaging Studies: I have personally reviewed pertinent reports.    EKG, Pathology, and Other Studies: I have personally reviewed pertinent reports.    VTE Prophylaxis: Sequential compression device (Venodyne)     Code Status: Level 1 - Full Code

## 2024-04-18 NOTE — UTILIZATION REVIEW
NOTIFICATION OF ADMISSION DISCHARGE   This is a Notification of Discharge from [facility]. Please be advised that this patient has been discharge from our facility. Below you will find the admission and discharge date and time including the patient’s disposition.   UTILIZATION REVIEW CONTACT:  Jana Vazquez  Utilization   Network Utilization Review Department  Phone: [phone] x carefully listen to the prompts. All voicemails are confidential.  Email: [email or web address]     ADMISSION INFORMATION  PRESENTATION DATE: 4/16/2024  8:06 AM  OBERVATION ADMISSION DATE:   INPATIENT ADMISSION DATE: 4/16/24  9:02 AM   DISCHARGE DATE: 4/17/2024  2:07 PM   DISPOSITION:Home/Self Care    Network Utilization Review Department  ATTENTION: Please call with any questions or concerns to [phone] and carefully listen to the prompts so that you are directed to the right person. All voicemails are confidential.   For Discharge needs, contact Care Management DC Support Team at [phone] opt. 2  Send all requests for admission clinical reviews, approved or denied determinations and any other requests to dedicated fax number below belonging to the campus where the patient is receiving treatment. List of dedicated fax numbers for the Facilities:  FACILITY NAME UR FAX NUMBER   ADMISSION DENIALS (Administrative/Medical Necessity) [phone]   DISCHARGE SUPPORT TEAM ([facility]) [phone]   PARENT CHILD HEALTH (Maternity/NICU/Pediatrics) [phone]   [facility] [phone]   [facility] [phone]   Critical access hospital [phone]   [facility] [phone]   [facility] [phone]   [facility] [phone]   [facility] [phone]   [facility] [phone]    [facility] [phone]   [facility] [phone]   [facility] [phone]   [facility] [phone]

## 2024-04-24 ENCOUNTER — TELEPHONE (OUTPATIENT)
Dept: CARDIAC SURGERY | Facility: CLINIC | Age: 79
End: 2024-04-24

## 2024-04-24 NOTE — TELEPHONE ENCOUNTER
Called patient to perform routine follow-up after hospital discharge s/p TAVR last week. No answer, left voicemail to call office with questions or concerns.

## 2024-05-02 ENCOUNTER — OFFICE VISIT (OUTPATIENT)
Dept: CARDIOLOGY CLINIC | Facility: CLINIC | Age: 79
End: 2024-05-02
Payer: COMMERCIAL

## 2024-05-02 VITALS
BODY MASS INDEX: 25.77 KG/M2 | HEART RATE: 63 BPM | WEIGHT: 174 LBS | DIASTOLIC BLOOD PRESSURE: 72 MMHG | HEIGHT: 69 IN | SYSTOLIC BLOOD PRESSURE: 120 MMHG

## 2024-05-02 DIAGNOSIS — I35.0 NONRHEUMATIC AORTIC VALVE STENOSIS: Primary | ICD-10-CM

## 2024-05-02 DIAGNOSIS — Z95.2 S/P TAVR (TRANSCATHETER AORTIC VALVE REPLACEMENT): ICD-10-CM

## 2024-05-02 DIAGNOSIS — I73.9 PERIPHERAL VASCULAR DISEASE, UNSPECIFIED (HCC): ICD-10-CM

## 2024-05-02 PROCEDURE — 99214 OFFICE O/P EST MOD 30 MIN: CPT | Performed by: PHYSICIAN ASSISTANT

## 2024-05-02 NOTE — PROGRESS NOTES
"Cardiology Office Follow Up  Song Camargo  1945  [id number]      ASSESSMENT:  Severe AS status post TAVR 2024  Wu REGINE 3 ultra Resilia bioprosthetic valve via percutaneous left transfemoral approach with final TEQUILA showing well-positioned bioprosthetic transcatheter aortic valve with normal function and trace paravalvular leak  Hypertension  Nonobstructive CAD  Preserved LV function on echo  Peripheral vascular disease, mild on CTA chest abdomen pelvis    PLAN/ DISCUSSION:  Doing well today in the aftermath of TAVR.  Having no symptoms of angina nor CHF.  Heart rate and blood pressure well-controlled.  Scheduled for cardiac rehab and follow-up echocardiogram later this month.  No changes were made to medications today.    Interval History/ HPI:   78-year-old gentleman with severe aortic stenosis recently underwent TAVR several weeks ago.  He is here today for posthospital follow-up.    Today comes the office feeling generally well.  He has no chest pain or chest pressure.  Breathing is stable.  No episodes of dizziness or passing out.  Scheduled for cardiac rehab and follow-up echocardiogram later this month.     Vitals:  /72 (BP Location: Left arm, Patient Position: Sitting, Cuff Size: Standard)   Pulse 63   Ht 5' 9\" (1.753 m)   Wt 78.9 kg (174 lb)   BMI 25.70 kg/m²      Past Medical History:   Diagnosis Date    Arthritis     Basal cell carcinoma (BCC)     Coronary artery disease     High blood pressure     High cholesterol     Lumbar radiculopathy 4/15/2024    Prostate CA (HCC)      Social History     Socioeconomic History    Marital status: /Civil Union     Spouse name: Not on file    Number of children: Not on file    Years of education: Not on file    Highest education level: Not on file   Occupational History    Not on file   Tobacco Use    Smoking status: Former     Current packs/day: 0.00     Types: Cigarettes     Quit date: 2004     Years since quittin.0    " Smokeless tobacco: Never   Vaping Use    Vaping status: Never Used   Substance and Sexual Activity    Alcohol use: Yes     Alcohol/week: 8.0 standard drinks of alcohol     Types: 8 Glasses of wine per week     Comment: drinks one glass of wine on the weekdays and maybe two on the weekend    Drug use: Never    Sexual activity: Not on file   Other Topics Concern    Not on file   Social History Narrative    Not on file     Social Determinants of Health     Financial Resource Strain: Not on file   Food Insecurity: No Food Insecurity (4/17/2024)    Hunger Vital Sign     Worried About Running Out of Food in the Last Year: Never true     Ran Out of Food in the Last Year: Never true   Transportation Needs: No Transportation Needs (4/17/2024)    PRAPARE - Transportation     Lack of Transportation (Medical): No     Lack of Transportation (Non-Medical): No   Physical Activity: Not on file   Stress: Not on file   Social Connections: Not on file   Intimate Partner Violence: Not on file   Housing Stability: Low Risk  (4/17/2024)    Housing Stability Vital Sign     Unable to Pay for Housing in the Last Year: No     Number of Places Lived in the Last Year: 1     Unstable Housing in the Last Year: No      Family History   Problem Relation Age of Onset    Sudden death Mother         cardiac    Hypertension Father     Coronary artery disease Father     Cancer Father     Hyperlipidemia Father      Past Surgical History:   Procedure Laterality Date    APPENDECTOMY      CARDIAC CATHETERIZATION N/A 3/13/2024    Procedure: Cardiac RHC/LHC;  Surgeon: Mir Sharpe MD;  Location: [facility];  Service: Cardiology    CARDIAC CATHETERIZATION N/A 4/16/2024    Procedure: Cardiac tavr;  Surgeon: Mir Sharpe MD;  Location: [facility];  Service: Cardiology    COLONOSCOPY  2014    MOHS SURGERY  2004    [place] REPLACE AORTIC VALVE OPENFEMORAL ARTERY APPROACH N/A 4/16/2024    Procedure: REPLACEMENT AORTIC VALVE TRANSCATHETER (TAVR)  TRANSFEMORAL W/ 23MM OLIVEIRA REGINE S3 UR VALVE(ACCESS ON LEFT) TEQUILA;  Surgeon: Jacob Macias DO;  Location: [facility];  Service: Cardiac Surgery    PROSTATECTOMY  2010    SHOULDER SURGERY Right     TONSILLECTOMY      VASECTOMY         Current Outpatient Medications:     acetaminophen (TYLENOL) 325 mg tablet, Take 2 tablets (650 mg total) by mouth every 4 (four) hours as needed for fever, mild pain, moderate pain or headaches (temperature greater than 101 F), Disp: , Rfl:     amLODIPine (NORVASC) 2.5 mg tablet, Take 2.5 mg by mouth in the morning., Disp: , Rfl:     aspirin 81 mg chewable tablet, Chew 1 tablet (81 mg total) daily, Disp: , Rfl:     atorvastatin (LIPITOR) 40 mg tablet, Take 1 tablet (40 mg total) by mouth daily, Disp: 90 tablet, Rfl: 3    clopidogrel (PLAVIX) 75 mg tablet, Take 1 tablet (75 mg total) by mouth daily, Disp: 90 tablet, Rfl: 0    hydroCHLOROthiazide 12.5 mg tablet, Take 1 tablet (12.5 mg total) by mouth daily Do not start before March 14, 2024., Disp: , Rfl:     lisinopril (ZESTRIL) 20 mg tablet, Take 1 tablet (20 mg total) by mouth daily Do not start before March 14, 2024., Disp: , Rfl:     pantoprazole (PROTONIX) 40 mg tablet, Take 1 tablet (40 mg total) by mouth daily, Disp: 30 tablet, Rfl: 0    polyethylene glycol (MIRALAX) 17 g packet, Take 17 g by mouth daily, Disp: 510 g, Rfl: 0      Review of Systems:  Review of Systems   Constitutional:  Negative for chills and fever.   HENT:  Negative for ear pain and sore throat.    Eyes:  Negative for pain and visual disturbance.   Respiratory:  Negative for cough and shortness of breath.    Cardiovascular:  Negative for chest pain and palpitations.   Gastrointestinal:  Negative for abdominal pain and vomiting.   Genitourinary:  Negative for dysuria and hematuria.   Musculoskeletal:  Negative for arthralgias and back pain.   Skin:  Negative for color change and rash.   Neurological:  Negative for seizures and syncope.   All other systems  reviewed and are negative.        Physical Exam:  Physical Exam  Constitutional:       Appearance: He is well-developed.   HENT:      Head: Normocephalic and atraumatic.   Eyes:      Pupils: Pupils are equal, round, and reactive to light.   Cardiovascular:      Rate and Rhythm: Normal rate and regular rhythm.      Heart sounds: Murmur heard.      No friction rub. No gallop.   Pulmonary:      Effort: Pulmonary effort is normal. No respiratory distress.      Breath sounds: Normal breath sounds. No wheezing or rales.   Abdominal:      General: Bowel sounds are normal. There is no distension.      Palpations: Abdomen is soft.      Tenderness: There is no abdominal tenderness.   Musculoskeletal:         General: No deformity. Normal range of motion.      Cervical back: Normal range of motion and neck supple.   Skin:     General: Skin is warm and dry.   Neurological:      Mental Status: He is alert and oriented to person, place, and time.   Psychiatric:         Behavior: Behavior normal.         This note was completed in part utilizing M-Modal Fluency Direct Software.  Grammatical errors, random word insertions, spelling mistakes, and incomplete sentences can be an occasional consequence of this system secondary to software limitations, ambient noise, and hardware issues.  If you have any questions or concerns about the content, text, or information contained within the body of this dictation, please contact the provider for clarification.

## 2024-05-02 NOTE — PATIENT INSTRUCTIONS
No changes to medications today, please continue everything the same. If you need refills of your cardiac medications prescribed by our office, please call us ahead of time so that they can be filled.   We will see you back in the office in 3-4 months. If you have any questions or concerns in the mean time please do not hesitate to call our office.

## 2024-05-10 ENCOUNTER — APPOINTMENT (OUTPATIENT)
Dept: LAB | Facility: CLINIC | Age: 79
End: 2024-05-10
Payer: COMMERCIAL

## 2024-05-10 DIAGNOSIS — I35.0 NONRHEUMATIC AORTIC VALVE STENOSIS: ICD-10-CM

## 2024-05-10 DIAGNOSIS — Z95.2 S/P TAVR (TRANSCATHETER AORTIC VALVE REPLACEMENT): ICD-10-CM

## 2024-05-10 LAB
ANION GAP SERPL CALCULATED.3IONS-SCNC: 8 MMOL/L (ref 4–13)
BASOPHILS # BLD AUTO: 0.04 THOUSANDS/ÂΜL (ref 0–0.1)
BASOPHILS NFR BLD AUTO: 1 % (ref 0–1)
BUN SERPL-MCNC: 14 MG/DL (ref 5–25)
CALCIUM SERPL-MCNC: 8.6 MG/DL (ref 8.4–10.2)
CHLORIDE SERPL-SCNC: 107 MMOL/L (ref 96–108)
CO2 SERPL-SCNC: 28 MMOL/L (ref 21–32)
CREAT SERPL-MCNC: 0.76 MG/DL (ref 0.6–1.3)
EOSINOPHIL # BLD AUTO: 0.16 THOUSAND/ÂΜL (ref 0–0.61)
EOSINOPHIL NFR BLD AUTO: 3 % (ref 0–6)
ERYTHROCYTE [DISTWIDTH] IN BLOOD BY AUTOMATED COUNT: 12.8 % (ref 11.6–15.1)
GFR SERPL CREATININE-BSD FRML MDRD: 88 ML/MIN/1.73SQ M
GLUCOSE P FAST SERPL-MCNC: 102 MG/DL (ref 65–99)
HCT VFR BLD AUTO: 42.1 % (ref 36.5–49.3)
HGB BLD-MCNC: 13.9 G/DL (ref 12–17)
IMM GRANULOCYTES # BLD AUTO: 0.04 THOUSAND/UL (ref 0–0.2)
IMM GRANULOCYTES NFR BLD AUTO: 1 % (ref 0–2)
LYMPHOCYTES # BLD AUTO: 1.98 THOUSANDS/ÂΜL (ref 0.6–4.47)
LYMPHOCYTES NFR BLD AUTO: 32 % (ref 14–44)
MCH RBC QN AUTO: 31.6 PG (ref 26.8–34.3)
MCHC RBC AUTO-ENTMCNC: 33 G/DL (ref 31.4–37.4)
MCV RBC AUTO: 96 FL (ref 82–98)
MONOCYTES # BLD AUTO: 0.61 THOUSAND/ÂΜL (ref 0.17–1.22)
MONOCYTES NFR BLD AUTO: 10 % (ref 4–12)
NEUTROPHILS # BLD AUTO: 3.38 THOUSANDS/ÂΜL (ref 1.85–7.62)
NEUTS SEG NFR BLD AUTO: 53 % (ref 43–75)
NRBC BLD AUTO-RTO: 0 /100 WBCS
PLATELET # BLD AUTO: 260 THOUSANDS/UL (ref 149–390)
PMV BLD AUTO: 10.4 FL (ref 8.9–12.7)
POTASSIUM SERPL-SCNC: 3.7 MMOL/L (ref 3.5–5.3)
RBC # BLD AUTO: 4.4 MILLION/UL (ref 3.88–5.62)
SODIUM SERPL-SCNC: 143 MMOL/L (ref 135–147)
WBC # BLD AUTO: 6.21 THOUSAND/UL (ref 4.31–10.16)

## 2024-05-10 PROCEDURE — 85025 COMPLETE CBC W/AUTO DIFF WBC: CPT

## 2024-05-10 PROCEDURE — 36415 COLL VENOUS BLD VENIPUNCTURE: CPT

## 2024-05-10 PROCEDURE — 80048 BASIC METABOLIC PNL TOTAL CA: CPT

## 2024-05-13 ENCOUNTER — OFFICE VISIT (OUTPATIENT)
Dept: CARDIAC SURGERY | Facility: CLINIC | Age: 79
End: 2024-05-13

## 2024-05-13 ENCOUNTER — HOSPITAL ENCOUNTER (OUTPATIENT)
Dept: NON INVASIVE DIAGNOSTICS | Facility: HOSPITAL | Age: 79
Discharge: HOME | End: 2024-05-13
Payer: COMMERCIAL

## 2024-05-13 ENCOUNTER — CLINICAL SUPPORT (OUTPATIENT)
Dept: CARDIAC REHAB | Facility: HOSPITAL | Age: 79
End: 2024-05-13
Payer: COMMERCIAL

## 2024-05-13 VITALS
BODY MASS INDEX: 27.11 KG/M2 | DIASTOLIC BLOOD PRESSURE: 70 MMHG | HEART RATE: 59 BPM | SYSTOLIC BLOOD PRESSURE: 136 MMHG | HEIGHT: 69 IN | WEIGHT: 183 LBS | TEMPERATURE: 96.9 F | OXYGEN SATURATION: 99 %

## 2024-05-13 VITALS
HEIGHT: 69 IN | HEART RATE: 63 BPM | BODY MASS INDEX: 25.77 KG/M2 | SYSTOLIC BLOOD PRESSURE: 120 MMHG | WEIGHT: 174 LBS | DIASTOLIC BLOOD PRESSURE: 72 MMHG

## 2024-05-13 DIAGNOSIS — I35.0 NONRHEUMATIC AORTIC VALVE STENOSIS: ICD-10-CM

## 2024-05-13 DIAGNOSIS — Z95.2 S/P TAVR (TRANSCATHETER AORTIC VALVE REPLACEMENT): Primary | ICD-10-CM

## 2024-05-13 DIAGNOSIS — Z95.2 S/P TAVR (TRANSCATHETER AORTIC VALVE REPLACEMENT): ICD-10-CM

## 2024-05-13 LAB
AORTIC ROOT: 2.9 CM
AORTIC VALVE MEAN VELOCITY: 17.8 M/S
APICAL FOUR CHAMBER EJECTION FRACTION: 75 %
ASCENDING AORTA: 3.7 CM
ATRIAL RATE: 64 BPM
AV AREA BY CONTINUOUS VTI: 1.9 CM2
AV AREA PEAK VELOCITY: 1.7 CM2
AV LVOT MEAN GRADIENT: 4 MMHG
AV LVOT PEAK GRADIENT: 6 MMHG
AV MEAN GRADIENT: 14 MMHG
AV PEAK GRADIENT: 25 MMHG
AV VALVE AREA: 2.09 CM2
AV VELOCITY RATIO: 0.48
BSA FOR ECHO PROCEDURE: 1.95 M2
DOP CALC AO PEAK VEL: 2.48 M/S
DOP CALC AO VTI: 56.54 CM
DOP CALC LVOT AREA: 3.8 CM2
DOP CALC LVOT CARDIAC INDEX: 3.44 L/MIN/M2
DOP CALC LVOT CARDIAC OUTPUT: 6.7 L/MIN
DOP CALC LVOT DIAMETER: 2.2 CM
DOP CALC LVOT PEAK VEL VTI: 31.07 CM
DOP CALC LVOT PEAK VEL: 1.2 M/S
DOP CALC LVOT STROKE INDEX: 55.4 ML/M2
DOP CALC LVOT STROKE VOLUME: 118.05 CM3
E WAVE DECELERATION TIME: 222 MS
E/A RATIO: 1.03
FRACTIONAL SHORTENING: 33 (ref 28–44)
INTERVENTRICULAR SEPTUM IN DIASTOLE (PARASTERNAL SHORT AXIS VIEW): 1.2 CM
INTERVENTRICULAR SEPTUM: 1.2 CM (ref 0.6–1.1)
LAAS-AP2: 20.7 CM2
LAAS-AP4: 20.4 CM2
LEFT ATRIUM SIZE: 4.3 CM
LEFT ATRIUM VOLUME (MOD BIPLANE): 65 ML
LEFT ATRIUM VOLUME INDEX (MOD BIPLANE): 33.3 ML/M2
LEFT INTERNAL DIMENSION IN SYSTOLE: 2.6 CM (ref 2.1–4)
LEFT VENTRICULAR INTERNAL DIMENSION IN DIASTOLE: 3.9 CM (ref 3.5–6)
LEFT VENTRICULAR POSTERIOR WALL IN END DIASTOLE: 1.4 CM
LEFT VENTRICULAR STROKE VOLUME: 42 ML
LVSV (TEICH): 42 ML
MV E'TISSUE VEL-LAT: 12 CM/S
MV E'TISSUE VEL-SEP: 10 CM/S
MV PEAK A VEL: 1.09 M/S
MV PEAK E VEL: 112 CM/S
MV STENOSIS PRESSURE HALF TIME: 64 MS
MV VALVE AREA P 1/2 METHOD: 3.44
P AXIS: 15 DEGREES
PR INTERVAL: 150 MS
QRS AXIS: 3 DEGREES
QRSD INTERVAL: 84 MS
QT INTERVAL: 414 MS
QTC INTERVAL: 427 MS
RA PRESSURE ESTIMATED: 8 MMHG
RIGHT ATRIUM AREA SYSTOLE A4C: 13.2 CM2
RIGHT VENTRICLE ID DIMENSION: 3.8 CM
SL CV LEFT ATRIUM LENGTH A2C: 5.6 CM
SL CV LV EF: 60
SL CV PED ECHO LEFT VENTRICLE DIASTOLIC VOLUME (MOD BIPLANE) 2D: 67 ML
SL CV PED ECHO LEFT VENTRICLE SYSTOLIC VOLUME (MOD BIPLANE) 2D: 25 ML
T WAVE AXIS: 25 DEGREES
TRICUSPID ANNULAR PLANE SYSTOLIC EXCURSION: 2.7 CM
TRICUSPID VALVE PEAK REGURGITATION VELOCITY: 2.74 M/S
VENTRICULAR RATE: 64 BPM

## 2024-05-13 PROCEDURE — 93010 ELECTROCARDIOGRAM REPORT: CPT | Performed by: INTERNAL MEDICINE

## 2024-05-13 PROCEDURE — 99024 POSTOP FOLLOW-UP VISIT: CPT | Performed by: THORACIC SURGERY (CARDIOTHORACIC VASCULAR SURGERY)

## 2024-05-13 PROCEDURE — 93005 ELECTROCARDIOGRAM TRACING: CPT

## 2024-05-13 PROCEDURE — 93306 TTE W/DOPPLER COMPLETE: CPT

## 2024-05-13 PROCEDURE — 93306 TTE W/DOPPLER COMPLETE: CPT | Performed by: INTERNAL MEDICINE

## 2024-05-13 PROCEDURE — 93797 PHYS/QHP OP CAR RHAB WO ECG: CPT

## 2024-05-13 NOTE — PROGRESS NOTES
"Procedure: S/P left transfemoral transcatheter aortic valve replacement 23mm Wu S3 UR, performed on 4/16/24 by Dr. Macias    History: Song Camargo is a 78 year old male who presents to our office today for routine follow up care following transcatheter aortic valve replacement.     Patient underwent elective admission for left TF TAVR 23 mm by Dr. Macias on 4/16.  Postop course was uneventful and he was discharged on postop day 1 without any issues.     Since discharge he has progressed well.  He saw cardiology on 5/2, correspondence reviewed, he was doing well then and no changes in his medication regimen were made.     He denies fever/chills, denies shortness of breath/chest pain, denies nausea vomiting/abdominal pain.  He is ambulating well.  He walks his dog frequently for over a mile, which he was not able to do prior to TAVR. He is eating and drinking well.  Passing urine and bowels well.  Denies any drainage or issues with his groin access site.     Vital Signs:   Vitals:    05/13/24 1000 05/13/24 1002   BP: 139/66 136/70   BP Location: Left arm Right arm   Patient Position: Sitting Sitting   Cuff Size: Standard Standard   Pulse: 59    Temp: (!) 96.9 °F (36.1 °C)    TempSrc: Tympanic    SpO2: 99%    Weight: 83 kg (183 lb)    Height: 5' 9\" (1.753 m)        Home Medications:   Prior to Admission medications    Medication Sig Start Date End Date Taking? Authorizing Provider   acetaminophen (TYLENOL) 325 mg tablet Take 2 tablets (650 mg total) by mouth every 4 (four) hours as needed for fever, mild pain, moderate pain or headaches (temperature greater than 101 F) 4/17/24   Jessy Wan PA-C   amLODIPine (NORVASC) 2.5 mg tablet Take 2.5 mg by mouth in the morning. 3/12/22   Historical Provider, MD   aspirin 81 mg chewable tablet Chew 1 tablet (81 mg total) daily 3/13/24   NIR Delgado   atorvastatin (LIPITOR) 40 mg tablet Take 1 tablet (40 mg total) by mouth daily 3/13/24   Brittney Rivera" NIR   clopidogrel (PLAVIX) 75 mg tablet Take 1 tablet (75 mg total) by mouth daily 4/18/24 7/17/24  Jessy Wan PA-C   hydroCHLOROthiazide 12.5 mg tablet Take 1 tablet (12.5 mg total) by mouth daily Do not start before March 14, 2024. 3/14/24   Brittney RiveraNIR   lisinopril (ZESTRIL) 20 mg tablet Take 1 tablet (20 mg total) by mouth daily Do not start before March 14, 2024. 3/14/24   Brittney DanniellehayleyNIR   pantoprazole (PROTONIX) 40 mg tablet Take 1 tablet (40 mg total) by mouth daily 4/18/24 5/18/24  Jessy Wan PA-C   polyethylene glycol (MIRALAX) 17 g packet Take 17 g by mouth daily 4/18/24 5/18/24  Jessy Wan PA-C       Physical Exam:    HEENT/NECK:  Normocephalic. Atraumatic.  No jugular venous distention.    Cardiac: Regular rate and rhythm  Pulmonary:  Breath sounds clear bilaterally  Abdomen:  Non-tender, Non-distended, and Normal bowel sounds  Incisions: Groin puncture sites well healed, clean, dry, and intact without hematoma  Extremities: Extremities warm/dry and No edema B/L  Neuro: Alert and oriented X 3 and No focal deficits  Skin: Warm/Dry, without rashes or lesions.    Lab Results:     Results from last 7 days   Lab Units 05/10/24  1042   WBC Thousand/uL 6.21   HEMOGLOBIN g/dL 13.9   HEMATOCRIT % 42.1   PLATELETS Thousands/uL 260     Results from last 7 days   Lab Units 05/10/24  1042   POTASSIUM mmol/L 3.7   CHLORIDE mmol/L 107   CO2 mmol/L 28   BUN mg/dL 14   CREATININE mg/dL 0.76   CALCIUM mg/dL 8.6       Imaging Studies:     Transthoracic Echocardiogram: 5/13  Completed, official read pending. Surgeon reviewed, no AI/PVL.       EKG 5/13:  Normal sinus rhythm with sinus arrhythmia     I have personally reviewed pertinent reports.   and I have personally reviewed pertinent films in PACS    TAVR evaluation Assessment:     [facility]: I    Assessment:   severe AS S/P S/P left transfemoral transcatheter aortic valve replacement 23mm Wu S3 UR, performed on 4/16/24 by   Colleen    Plan:     Song Camargo continues to recover well following transcatheter aortic valve replacement.  Groin access sites are well healed. Vital signs are stable.     To date they have made progressive improvements with physical rehabilitation. At this point I have cleared them to begin outpatient cardiac rehabilitation and have encouraged them to to do so.  Song Camargo has also been cleared to resume driving at this point. I have asked that they do so in small, progressive increments.    Song Camargo has already been evaluated by their primary care physician and their cardiologist for ongoing medical care. Plavix will be continued for an additional 2 months, for a total of 3 months of therapy for TAVR prophylaxis.  Arrangements will be made for one year surveillance follow up with repeat ECG and echocardiogram.  I have advised them to notify their PCP with any new concerns that may arise in the interim. Song Camargo was comfortable with our recommendations and their questions were answered to their satisfaction.    Finally, Song Camargo was educated on their increased risk for developing a prosthetic valve infection with many common dental procedures. Subsequently, we advised them to inform their dentist that they have an implanted TAVR valve. They are not to schedule routine dental cleaning for six months following surgery and antibiotics will need to be prescribed by their dentist, prior to any dental procedures.     Endy Barrera PA-C  05/13/24  10:15 AM    * This note was completed in part utilizing batterii direct voice recognition software.   Grammatical errors, random word insertion, spelling mistakes, and incomplete sentences may be an occasional consequence of the system secondary to software limitations, ambient noise and hardware issues. At the time of dictation, efforts were made to edit, clarify and /or correct errors. Please read the chart carefully and recognize, using  context, where substitutions have occurred.  If you have any questions or concerns about the context, text or information contained within the body of this dictation, please contact myself, the provider, for further clarification.

## 2024-05-13 NOTE — PROGRESS NOTES
CARDIAC REHABILITATION ASSESSMENT AND INDIVIDUALIZED TREATMENT PLAN  INITIAL           Today's date: 2024   # of Exercise Sessions Completed: Initial Evaluation Today  Patient name: Song Camargo      : 1945  Age: 78 y.o.       MRN: [id number]  Referring Physician: Jacob Macias DO  Cardiologist: Nate Page  Provider: Juan Carlos  Clinician: Iraida Maza MS, CEP        Dr. Page,   Please review the following patient assessment for Song Camargo to begin cardiac rehabilitation post TAVR.  Please verify you agree with the outlined plan of care and exercise prescription by signing with attached order.    Thank You.      Comments: n/a        Dx:   Encounter Diagnosis   Name Primary?    S/P TAVR (transcatheter aortic valve replacement)        Description of Diagnosis: TAVR    Date of onset: 2024    Other Cardiac History: n/a          ASSESSMENT    Medical History:   Past Medical History:   Diagnosis Date    Arthritis     Basal cell carcinoma (BCC)     Coronary artery disease     High blood pressure     High cholesterol     Lumbar radiculopathy 4/15/2024    Prostate CA (HCC)        Family History:  Family History   Problem Relation Age of Onset    Sudden death Mother         cardiac    Hypertension Father     Coronary artery disease Father     Cancer Father     Hyperlipidemia Father        Allergies:   Patient has no known allergies.    Current Medications:   Current Outpatient Medications   Medication Sig Dispense Refill    acetaminophen (TYLENOL) 325 mg tablet Take 2 tablets (650 mg total) by mouth every 4 (four) hours as needed for fever, mild pain, moderate pain or headaches (temperature greater than 101 F) (Patient taking differently: Take 650 mg by mouth every 4 (four) hours as needed for fever, mild pain, moderate pain or headaches (temperature greater than 101 F) As needed)      amLODIPine (NORVASC) 2.5 mg tablet Take 2.5 mg by mouth in the morning.      aspirin 81 mg chewable tablet  Chew 1 tablet (81 mg total) daily      atorvastatin (LIPITOR) 40 mg tablet Take 1 tablet (40 mg total) by mouth daily 90 tablet 3    clopidogrel (PLAVIX) 75 mg tablet Take 1 tablet (75 mg total) by mouth daily 90 tablet 0    hydroCHLOROthiazide 12.5 mg tablet Take 1 tablet (12.5 mg total) by mouth daily Do not start before March 14, 2024.      lisinopril (ZESTRIL) 20 mg tablet Take 1 tablet (20 mg total) by mouth daily Do not start before March 14, 2024.      pantoprazole (PROTONIX) 40 mg tablet Take 1 tablet (40 mg total) by mouth daily 30 tablet 0    polyethylene glycol (MIRALAX) 17 g packet Take 17 g by mouth daily (Patient taking differently: Take 17 g by mouth daily As needed) 510 g 0     No current facility-administered medications for this visit.       Medication compliance: Yes   Comments: Pt reports to be compliant with medications    Physical Limitations: arthritis in toes, limits incline walking. Rare sciatica pain    Fall Risk: Low   Comments: Ambulates with a steady gait with no assist device    Cultural needs: n/a      CAD Risk Factors:  Cholesterol: No  HTN: Yes  DM: No  Obesity: No   Inactivity: No      EXERCISE ASSESSMENT:     Initial Fitness Assessment:   Submaximal TM ETT:  Resting:  BP: 114/76  HR: 69, Exercise:  BP: 132/72  HR: 95, METs:  4.3, and ECG Summary: NSR      Functional Status Prior to Diagnosis for Treatment:   Occupation: retired  Recreation/Physical Activity: gardening, pickleball  ADL’s: resumed all ADLs  [place]: resumed all ADLs  Home exercise equipment:  none  Home exercise: walking approx. 1.5 miles/day  Other: n/a    Current Functional Status:  Occupation: retired  Recreation/Physical Activity: gardening, pickleball  ADL’s:resumed all ADLs  [place]: resumed all ADLs  Home exercise: walking approx. 1.5 miles/day  Other: n/a    Functional Capacity Screening Tool:  [facility] Activity Status Index:  7.01 METs      PSYCHOSOCIAL ASSESSMENT:    Depression screening:  PHQ-9 = 1   "  Interpretation:  1-4 = Minimal Depression  Anxiety screening:  CALVIN-7 = 2    Interpretation: 0-4  = Not anxious    Pt self-report of depression and anxiety   Patient reports they are coping well with good social support and denies depression or anxiety  Reports sufficient emotional support       Self-reported stress level:  2   Stressors:  TAVR surgery, caused minimal stress  Stress Management Tools:  no specific stress management tools    Quality of Life Screen:  (Higher score indicates disease impact on QOL)  [facility] COOP score: 17/40        Social Support:   family  Community/Social Activities: spending time with family     Psychosocial Assessment as it relates to rehabilitation:   Patient denies issues with his/her family or home life that may affect their rehabilitation efforts.       NUTRITION ASSESSMENT:    Weight:    Wt Readings from Last 1 Encounters:   05/13/24 83 kg (183 lb)        Height:   Ht Readings from Last 1 Encounters:   05/13/24 5' 9\" (1.753 m)       Rate Your Plate Score: 65/81    Diabetes: N/A  A1c: no A1c in chart    last measured: n/a    Lipid management:  no lipid profile in chart    Current Dietary Habits:  Reports he chooses low sodium foods, reads food labels when shopping, watches fat content in foods, eats fish and tuna    Drug/Alcohol Use:   N/A      OTHER CORE COMPONENT ASSESSMENT:    Tobacco Use:     N/A: Pt has a remote history of smoking    Anginal Symptoms:  lightheadedness   NTG use: No prescription        INDIVIDUALIZED TREATMENT PLAN      Patient will attend 35 monitored exercise sessions beginning 5/16/2024.    See outlined plan of care below for specific patient goals in each component of care.        EXERCISE GOAL and PLAN      SMART Goals:   10% improvement in functional capacity based on max METs achieved in initial fitness assessment  improved DASI score by 10%  increased exercise capacity by 40% based on peak METs tolerated in cardiac rehab exercise session  maintain " > 150 minutes per week of moderate intensity exercise    Patient Specific EXERCISE GOALS:       resume ADLs with increased strength, attend rehab regularly, and resume yard work    Progress toward SMART and personal Exercise goals: Patient is agreeable to attend cardiopulmonary rehab exercise sessions 3x/wk x 36 sessions.    Plan for next 30 days:    education on home exercise guidelines, home exercise 30+ mins 2 days opposite CR, and Group class: Risk Factors for Heart Disease    The patient was counseled on exercise guidelines to achieve a minimum of 150 mins/wk of moderate intensity (RPE 4-6)   exercise and encouraged to add 1-2 days of exercise on opposite days of cardiac rehab as tolerated.     Current Aerobic Exercise Prescription:      Frequency: 3 days/week   Supplement with home exercise 2+ days/wk as tolerated       Minutes: 20 - 40         METS: 3.2 - 4.2            HR: RHR +30-40bpm   RPE: 4-6         Modalities: Treadmill, UBE, NuStep, and Recumbent bike     Exercise workloads will be progressed gradually as tolerated, within limits of patient's ability, and according to the patient's   response to the exercise program.      Aerobic Exercise Prescription - 30 day goal:   Frequency: 3 days/week of cardiac rehab       Supplement with home exercise 2+ days/wk as tolerated    Minutes: 40       >150 mins/wk of moderate intensity exercise   METS: 3.5 - 4.5   HR: RHR +30-40 bpm     RPE: 4-6   Modalities: Treadmill, UBE, NuStep, and Recumbent bike    Strength trainin-3 days / week  12-15 repetitions  1-2 sets per modality   Will be added following at least 8 weeks post surgery and 8-10 monitored sessions   Modalities: Pull Downs, Lateral Raise, Arm Extension, Arm Curl, and leg extensions    Home Exercise: Type: walking, Distance 1.5 miles, daily    Group and Individual Education: benefit of exercise for CAD risk factors and RPE scale     Readiness to change: Preparation:  (Getting ready to change)        NUTRITION GOALS AND PLAN    Weight control:    Starting weight: 182.0 lbs   Current weight:     Nutritional   Reviewed patient's Rate your Plate. Discussed key elements of heart healthy eating. Reviewed patient goals for dietary modifications and their clinical implications.  Reviewed most recent lipid profile.     SMART Goals:   eat 6 or more servings of grain products per day, eat 2 or more servings of low fat milk or yogurt a day, eat no more than 6oz of meat per day, eat red meat once a week or less, and rarely/never drink more than 1-2 alcoholic drinks per day.    Patient Specific NUTRITION GOALS:     Continue to read food labels and watch for sodium and fat content.     Patient's progress toward SMART and personal Nutrition goals:   Patient is agreeable to attend cardiopulmonary rehab exercise sessions 3x/wk x 36 sessions.    Plan for next 30 days:   group class: Reading Food Labels and group Class: Heart Healthy Eating    Measurable goals were based Rate Your Plate Dietary Self-Assessment. These are the areas in which the patient could score higher on the assessment.  Goals include recommendations for a heart healthy diet based on American Heart Association.    Group and Individual Education:   heart healthy eating principles  low sodium diet    Readiness to change: Preparation:  (Getting ready to change)       PSYCHOSOCIAL ASSESSMENT AND PLAN    Psychosocial Assessment as it relates to rehabilitation:   Patient denies issues with his/her family or home life that may affect their rehabilitation efforts.     SMART Goals:     Physical Fitness in [place] Score < 3, Overall Health in [place] Score < 3, and Change in Health in [place] Score < 3     Patient Specific PSYCHOCOSOCIAL GOALS:     spend time with family    Patient's progress toward SMART and personal Psychosocial goals:   Patient is agreeable to attend cardiopulmonary rehab exercise sessions 3x/wk x 36 sessions.    Plan for next 30 days:    Class: Stress and Your Health and Class: Relaxation    Group and Individual Education: benefits of a positive support system and stress management techniques    Information to utilize [employer] was provided as well as contact information for counseling through  Behavioral [facility] and group psychotherapy groups available.    Readiness to change: Preparation:  (Getting ready to change)       OTHER CORE COMPONENTS GOALS and PLAN      Blood Pressure will be monitored throughout the program and cardiologist will be notified of elevated trends.    Pt will be encouraged to monitor home BP if advised by cardiologist.    Tobacco Intervention:   N/A:  Pt has a remote history of smoking.      SMART Goals:   consistent, controlled resting BP < 130/80 and medication compliance    Patient Specific CORE COMPONENT GOALS:    reduced dietary sodium <2000mg and medication compliance    Progress toward SMART and personal Core Component goals:   Patient is agreeable to attend cardiopulmonary rehab exercise sessions 3x/wk x 36 sessions.    Plan for next 30 days:   group class: Understanding Heart Disease, group class: Common Heart Medications, medication compliance, and monitor home BP    Group and Individual Education:  understanding high blood pressure and it's relationship to CAD and components of blood pressure management    Readiness to change: Preparation:  (Getting ready to change)

## 2024-05-16 ENCOUNTER — CLINICAL SUPPORT (OUTPATIENT)
Dept: CARDIAC REHAB | Facility: HOSPITAL | Age: 79
End: 2024-05-16
Payer: COMMERCIAL

## 2024-05-16 DIAGNOSIS — Z95.2 S/P TAVR (TRANSCATHETER AORTIC VALVE REPLACEMENT): Primary | ICD-10-CM

## 2024-05-16 PROCEDURE — 93798 PHYS/QHP OP CAR RHAB W/ECG: CPT

## 2024-05-17 ENCOUNTER — CLINICAL SUPPORT (OUTPATIENT)
Dept: CARDIAC REHAB | Facility: HOSPITAL | Age: 79
End: 2024-05-17
Payer: COMMERCIAL

## 2024-05-17 DIAGNOSIS — Z95.2 S/P TAVR (TRANSCATHETER AORTIC VALVE REPLACEMENT): Primary | ICD-10-CM

## 2024-05-17 PROCEDURE — 93798 PHYS/QHP OP CAR RHAB W/ECG: CPT

## 2024-05-21 ENCOUNTER — CLINICAL SUPPORT (OUTPATIENT)
Dept: CARDIAC REHAB | Facility: HOSPITAL | Age: 79
End: 2024-05-21
Payer: COMMERCIAL

## 2024-05-21 DIAGNOSIS — Z95.2 S/P TAVR (TRANSCATHETER AORTIC VALVE REPLACEMENT): Primary | ICD-10-CM

## 2024-05-21 PROCEDURE — 93798 PHYS/QHP OP CAR RHAB W/ECG: CPT

## 2024-05-23 ENCOUNTER — CLINICAL SUPPORT (OUTPATIENT)
Dept: CARDIAC REHAB | Facility: HOSPITAL | Age: 79
End: 2024-05-23
Payer: COMMERCIAL

## 2024-05-23 DIAGNOSIS — Z95.2 S/P TAVR (TRANSCATHETER AORTIC VALVE REPLACEMENT): Primary | ICD-10-CM

## 2024-05-23 PROCEDURE — 93798 PHYS/QHP OP CAR RHAB W/ECG: CPT

## 2024-05-24 ENCOUNTER — CLINICAL SUPPORT (OUTPATIENT)
Dept: CARDIAC REHAB | Facility: HOSPITAL | Age: 79
End: 2024-05-24
Payer: COMMERCIAL

## 2024-05-24 DIAGNOSIS — Z95.2 S/P TAVR (TRANSCATHETER AORTIC VALVE REPLACEMENT): Primary | ICD-10-CM

## 2024-05-24 PROCEDURE — 93798 PHYS/QHP OP CAR RHAB W/ECG: CPT

## 2024-05-28 ENCOUNTER — CLINICAL SUPPORT (OUTPATIENT)
Dept: CARDIAC REHAB | Facility: HOSPITAL | Age: 79
End: 2024-05-28
Payer: COMMERCIAL

## 2024-05-28 DIAGNOSIS — Z95.2 S/P TAVR (TRANSCATHETER AORTIC VALVE REPLACEMENT): Primary | ICD-10-CM

## 2024-05-28 PROCEDURE — 93798 PHYS/QHP OP CAR RHAB W/ECG: CPT

## 2024-05-30 ENCOUNTER — CLINICAL SUPPORT (OUTPATIENT)
Dept: CARDIAC REHAB | Facility: HOSPITAL | Age: 79
End: 2024-05-30
Payer: COMMERCIAL

## 2024-05-30 DIAGNOSIS — Z95.2 S/P TAVR (TRANSCATHETER AORTIC VALVE REPLACEMENT): Primary | ICD-10-CM

## 2024-05-30 PROCEDURE — 93798 PHYS/QHP OP CAR RHAB W/ECG: CPT

## 2024-05-31 ENCOUNTER — APPOINTMENT (OUTPATIENT)
Dept: CARDIAC REHAB | Facility: HOSPITAL | Age: 79
End: 2024-05-31
Payer: COMMERCIAL

## 2024-05-31 ENCOUNTER — NURSE TRIAGE (OUTPATIENT)
Age: 79
End: 2024-05-31

## 2024-05-31 NOTE — TELEPHONE ENCOUNTER
"Reason for Disposition   Fall in systolic BP > 20 mm Hg from normal and feeling dizzy, lightheaded, or weak    Answer Assessment - Initial Assessment Questions  1. BLOOD PRESSURE: \"What is the blood pressure?\" \"Did you take at least two measurements 5 minutes apart?\"      81/50 when I returned home from the store today.  2. ONSET: \"When did you take your blood pressure?\"      This morning and this afternoon.  3. HOW: \"How did you obtain the blood pressure?\" (e.g., visiting nurse, automatic home BP monitor)      Automatic BP cuff on upper arm.  4. HISTORY: \"Do you have a history of low blood pressure?\" \"What is your blood pressure normally?\" 104-105 systolic normally        5. MEDICATIONS: \"Are you taking any medications for blood pressure?\" If Yes, ask: \"Have they been changed recently?\"      No changes. Taking Lisinopril ,HCTZ, Norvasc.  6. PULSE RATE: \"Do you know what your pulse rate is?\"      67 right now  7. OTHER SYMPTOMS: Lightheaded, dizzy.    Protocols used: Blood Pressure - Low-ADULT-OH    Patient was out today running errands and had 6- 7 episodes of feeling lightheaded, dizzy  and like he might faint.   He has been going to cardiac rehab and doing ok, last session was yesterday.     When he arrived home from running errands, his BP was 81/50.  He rechecked BP while on the phone and it was 109/57.  HR 67.  He had an ov with ENT today but no BP recorded. BP this AM at home was 95 systolic.  He took all meds this AM , Lisinopril 20 mg daily, HCTZ 12.5 mg daily, Amlodipine 2.5 mg daily.  He said he doesn't drink much water, only coffee. I advised him to hydrate.  I recommended ER but he declined. He wants to rest at home. Would come to office for BP and EKG if recommended.     "

## 2024-06-03 NOTE — TELEPHONE ENCOUNTER
Called pt to follow up on current state of overall symptoms and BP readings. Pt explained BPs have improved along with symptoms.

## 2024-06-04 ENCOUNTER — CLINICAL SUPPORT (OUTPATIENT)
Dept: CARDIAC REHAB | Facility: HOSPITAL | Age: 79
End: 2024-06-04
Payer: COMMERCIAL

## 2024-06-04 DIAGNOSIS — Z95.2 S/P TAVR (TRANSCATHETER AORTIC VALVE REPLACEMENT): Primary | ICD-10-CM

## 2024-06-04 PROCEDURE — 93798 PHYS/QHP OP CAR RHAB W/ECG: CPT

## 2024-06-06 ENCOUNTER — CLINICAL SUPPORT (OUTPATIENT)
Dept: CARDIAC REHAB | Facility: HOSPITAL | Age: 79
End: 2024-06-06
Payer: COMMERCIAL

## 2024-06-06 DIAGNOSIS — Z95.2 S/P TAVR (TRANSCATHETER AORTIC VALVE REPLACEMENT): Primary | ICD-10-CM

## 2024-06-07 ENCOUNTER — APPOINTMENT (OUTPATIENT)
Dept: CARDIAC REHAB | Facility: HOSPITAL | Age: 79
End: 2024-06-07
Payer: COMMERCIAL

## 2024-06-10 DIAGNOSIS — Z95.2 S/P TAVR (TRANSCATHETER AORTIC VALVE REPLACEMENT): ICD-10-CM

## 2024-06-10 NOTE — TELEPHONE ENCOUNTER
Patient called to request a refill for their PLAVIX 75MG . Patient verbalized understanding and is in agreement.     Pt states he has been out of medication for 2 days and concerned about a clot.

## 2024-06-10 NOTE — TELEPHONE ENCOUNTER
Reason for call:     Patient states he is out of plavix, [facility] only gave him 45 pills when script was filled 4/17/24, he should have recvd 90     [x] Refill   [] Prior Auth  [] Other:     Office:   [] PCP/Provider -   [x] Specialty/Provider - ADONAY gee    Medication:   Plavix 75 mg, 1 qd, 90    Pharmacy:   [facility], [address]    Does the patient have enough for 3 days?   [] Yes   [x] No - Send as HP to POD-out of medication

## 2024-06-11 ENCOUNTER — CLINICAL SUPPORT (OUTPATIENT)
Dept: CARDIAC REHAB | Facility: HOSPITAL | Age: 79
End: 2024-06-11
Payer: COMMERCIAL

## 2024-06-11 DIAGNOSIS — Z95.2 S/P TAVR (TRANSCATHETER AORTIC VALVE REPLACEMENT): Primary | ICD-10-CM

## 2024-06-11 PROCEDURE — 93798 PHYS/QHP OP CAR RHAB W/ECG: CPT

## 2024-06-11 RX ORDER — CLOPIDOGREL BISULFATE 75 MG/1
75 TABLET ORAL DAILY
Qty: 90 TABLET | Refills: 1 | Status: SHIPPED | OUTPATIENT
Start: 2024-06-11 | End: 2024-12-08

## 2024-06-11 NOTE — PROGRESS NOTES
CARDIAC REHABILITATION ASSESSMENT AND INDIVIDUALIZED TREATMENT PLAN  30 DAY          Today's date: 2024   # of Exercise Sessions Completed: 11  Patient name: Song Camargo      : 1945  Age: 79 y.o.       MRN: [id number]  Referring Physician: Jessy Wan PA*  Cardiologist: Nate Page  Provider: Juan Carlos  Clinician: Iraida Maza MS, CEP        Dr. Page,   Please review the following patient assessment for Song Camargo to continue cardiac rehabilitation post TAVR.  Please verify you agree with the outlined plan of care and exercise prescription by signing with attached order.    Thank You.      Comments: n/a        Dx:   Encounter Diagnosis   Name Primary?    S/P TAVR (transcatheter aortic valve replacement) Yes       Description of Diagnosis: TAVR    Date of onset: 2024    Other Cardiac History: n/a          ASSESSMENT    Medical History:   Past Medical History:   Diagnosis Date    Arthritis     Basal cell carcinoma (BCC)     Coronary artery disease     High blood pressure     High cholesterol     Lumbar radiculopathy 4/15/2024    Prostate CA (HCC)        Family History:  Family History   Problem Relation Age of Onset    Sudden death Mother         cardiac    Hypertension Father     Coronary artery disease Father     Cancer Father     Hyperlipidemia Father        Allergies:   Patient has no known allergies.    Current Medications:   Current Outpatient Medications   Medication Sig Dispense Refill    acetaminophen (TYLENOL) 325 mg tablet Take 2 tablets (650 mg total) by mouth every 4 (four) hours as needed for fever, mild pain, moderate pain or headaches (temperature greater than 101 F) (Patient taking differently: Take 650 mg by mouth every 4 (four) hours as needed for fever, mild pain, moderate pain or headaches (temperature greater than 101 F) As needed)      amLODIPine (NORVASC) 2.5 mg tablet Take 2.5 mg by mouth in the morning.      aspirin 81 mg chewable tablet Chew 1 tablet  (81 mg total) daily      atorvastatin (LIPITOR) 40 mg tablet Take 1 tablet (40 mg total) by mouth daily 90 tablet 3    clopidogrel (PLAVIX) 75 mg tablet Take 1 tablet (75 mg total) by mouth daily 90 tablet 1    hydroCHLOROthiazide 12.5 mg tablet Take 1 tablet (12.5 mg total) by mouth daily Do not start before March 14, 2024.      lisinopril (ZESTRIL) 20 mg tablet Take 1 tablet (20 mg total) by mouth daily Do not start before March 14, 2024.      pantoprazole (PROTONIX) 40 mg tablet Take 1 tablet (40 mg total) by mouth daily 30 tablet 0    polyethylene glycol (MIRALAX) 17 g packet Take 17 g by mouth daily (Patient taking differently: Take 17 g by mouth daily As needed) 510 g 0     No current facility-administered medications for this visit.       Medication compliance: Yes   Comments: Pt reports to be compliant with medications    Physical Limitations: arthritis in toes, limits incline walking. Rare sciatica pain    Fall Risk: Low   Comments: Ambulates with a steady gait with no assist device    Cultural needs: n/a      CAD Risk Factors:  Cholesterol: No  HTN: Yes  DM: No  Obesity: No   Inactivity: No      EXERCISE ASSESSMENT:     Initial Fitness Assessment:   Submaximal TM ETT:  Resting:  BP: 114/76  HR: 69, Exercise:  BP: 132/72  HR: 95, METs:  4.3, and ECG Summary: NSR      Functional Status Prior to Diagnosis for Treatment:   Occupation: retired  Recreation/Physical Activity: gardening, pickleball  ADL’s: resumed all ADLs  [place]: resumed all ADLs  Home exercise equipment:  none  Home exercise: walking approx. 1.5 miles/day  Other: n/a    Current Functional Status:  Occupation: retired  Recreation/Physical Activity: gardening, pickleball  ADL’s:resumed all ADLs  [place]: resumed all ADLs  Home exercise: walking approx. 1.5 miles/day  Other: n/a    Functional Capacity Screening Tool:  [facility] Activity Status Index:  7.01 METs      PSYCHOSOCIAL ASSESSMENT:    Depression screening:  PHQ-9 = 1   "  Interpretation:  1-4 = Minimal Depression  Anxiety screening:  CALVIN-7 = 2    Interpretation: 0-4  = Not anxious    Pt self-report of depression and anxiety   Patient reports they are coping well with good social support and denies depression or anxiety  Reports sufficient emotional support       Self-reported stress level:  2   Stressors:  TAVR surgery, caused minimal stress  Stress Management Tools:  no specific stress management tools    Quality of Life Screen:  (Higher score indicates disease impact on QOL)  [facility] COOP score: 17/40        Social Support:   family  Community/Social Activities: spending time with family     Psychosocial Assessment as it relates to rehabilitation:   Patient denies issues with his/her family or home life that may affect their rehabilitation efforts.       NUTRITION ASSESSMENT:    Weight:    Wt Readings from Last 1 Encounters:   05/31/24 79.4 kg (175 lb)        Height:   Ht Readings from Last 1 Encounters:   05/31/24 5' 9\" (1.753 m)       Rate Your Plate Score: 65/81    Diabetes: N/A  A1c: no A1c in chart    last measured: n/a    Lipid management:  no lipid profile in chart    Current Dietary Habits:  Reports he chooses low sodium foods, reads food labels when shopping, watches fat content in foods, eats fish and tuna    Drug/Alcohol Use:   N/A      OTHER CORE COMPONENT ASSESSMENT:    Tobacco Use:     N/A: Pt has a remote history of smoking    Anginal Symptoms:  lightheadedness   NTG use: No prescription        INDIVIDUALIZED TREATMENT PLAN      Patient will attend 35 monitored exercise sessions beginning 5/16/2024.    See outlined plan of care below for specific patient goals in each component of care.        EXERCISE GOAL and PLAN      SMART Goals:   10% improvement in functional capacity based on max METs achieved in initial fitness assessment  improved DASI score by 10%  increased exercise capacity by 40% based on peak METs tolerated in cardiac rehab exercise " session  maintain > 150 minutes per week of moderate intensity exercise    Patient Specific EXERCISE GOALS:       resume ADLs with increased strength, attend rehab regularly, and resume yard work    Progress toward SMART and personal Exercise goals:  resuming ADLs with increased strength, attending rehab regularly, resuming yard work, walking 1.5 miles daily, playing pickleball with his dog    Plan for next 30 days:    education on home exercise guidelines, home exercise 30+ mins 2 days opposite CR, and Group class: Risk Factors for Heart Disease    The patient was counseled on exercise guidelines to achieve a minimum of 150 mins/wk of moderate intensity (RPE 4-6)   exercise and encouraged to add 1-2 days of exercise on opposite days of cardiac rehab as tolerated.     Current Aerobic Exercise Prescription:      Frequency: 3 days/week   Supplement with home exercise 2+ days/wk as tolerated       Minutes: 20 - 40         METS: 3.2 - 4.3            HR: RHR +30-40bpm   RPE: 4-6         Modalities: Treadmill, UBE, NuStep, and Recumbent bike     Exercise workloads will be progressed gradually as tolerated, within limits of patient's ability, and according to the patient's   response to the exercise program.      Aerobic Exercise Prescription - 30 day goal:   Frequency: 3 days/week of cardiac rehab       Supplement with home exercise 2+ days/wk as tolerated    Minutes: 40       >150 mins/wk of moderate intensity exercise   METS: 3.5 - 4.8   HR: RHR +30-40 bpm     RPE: 4-6   Modalities: Treadmill, UBE, NuStep, and Recumbent bike    Strength trainin-3 days / week  12-15 repetitions  1-2 sets per modality   Will be added following at least 8 weeks post surgery and 8-10 monitored sessions   Modalities: Pull Downs, Lateral Raise, Arm Extension, Arm Curl, and leg extensions    Home Exercise: Type: walking, Distance 1.5 miles, daily    Group and Individual Education: benefit of exercise for CAD risk factors and RPE scale      Readiness to change: Action:  (Changing behavior)      NUTRITION GOALS AND PLAN    Weight control:    Starting weight: 182.0 lbs   Current weight: 182.0 lbs    Nutritional   Reviewed patient's Rate your Plate. Discussed key elements of heart healthy eating. Reviewed patient goals for dietary modifications and their clinical implications.  Reviewed most recent lipid profile.     SMART Goals:   eat 6 or more servings of grain products per day, eat 2 or more servings of low fat milk or yogurt a day, eat no more than 6oz of meat per day, eat red meat once a week or less, and rarely/never drink more than 1-2 alcoholic drinks per day.    Patient Specific NUTRITION GOALS:     Continue to read food labels and watch for sodium and fat content.     Patient's progress toward SMART and personal Nutrition goals:   Patient is agreeable to making dietary modifications.    Plan for next 30 days:   group class: Reading Food Labels and group Class: Heart Healthy Eating    Measurable goals were based Rate Your Plate Dietary Self-Assessment. These are the areas in which the patient could score higher on the assessment.  Goals include recommendations for a heart healthy diet based on American Heart Association.    Group and Individual Education:   heart healthy eating principles  low sodium diet    Readiness to change: Action:  (Changing behavior)      PSYCHOSOCIAL ASSESSMENT AND PLAN    Psychosocial Assessment as it relates to rehabilitation:   Patient denies issues with his/her family or home life that may affect their rehabilitation efforts.     SMART Goals:     Physical Fitness in [place] Score < 3, Overall Health in [place] Score < 3, and Change in Health in [place] Score < 3     Patient Specific PSYCHOCOSOCIAL GOALS:     spend time with family    Patient's progress toward SMART and personal Psychosocial goals:   No reported changes in psychosocial status    Plan for next 30 days:   Class: Stress and Your Health and  Class: Relaxation    Group and Individual Education: benefits of a positive support system and stress management techniques    Information to utilize [employer] was provided as well as contact information for counseling through  Behavioral Health and group psychotherapy groups available.    Readiness to change: Action:  (Changing behavior)      OTHER CORE COMPONENTS GOALS and PLAN      Blood Pressure will be monitored throughout the program and cardiologist will be notified of elevated trends.    Pt will be encouraged to monitor home BP if advised by cardiologist.    Tobacco Intervention:   N/A:  Pt has a remote history of smoking.      SMART Goals:   consistent, controlled resting BP < 130/80 and medication compliance    Patient Specific CORE COMPONENT GOALS:    reduced dietary sodium <2000mg and medication compliance    Progress toward SMART and personal Core Component goals:    Pt was experiencing episodes of symptomatic low BP. He was taken off of amlodipine and was encouraged to hydrate more. He reports he has been feeling better and has been getting higher BP readings since making those changes.     Plan for next 30 days:   group class: Understanding Heart Disease, group class: Common Heart Medications, medication compliance, and monitor home BP    Group and Individual Education:  understanding high blood pressure and it's relationship to CAD, components of blood pressure management, and group class:  Common Heart Medications    Readiness to change: Action:  (Changing behavior)

## 2024-06-13 ENCOUNTER — CLINICAL SUPPORT (OUTPATIENT)
Dept: CARDIAC REHAB | Facility: HOSPITAL | Age: 79
End: 2024-06-13
Payer: COMMERCIAL

## 2024-06-13 DIAGNOSIS — Z95.2 S/P TAVR (TRANSCATHETER AORTIC VALVE REPLACEMENT): Primary | ICD-10-CM

## 2024-06-13 PROCEDURE — 93798 PHYS/QHP OP CAR RHAB W/ECG: CPT

## 2024-06-14 ENCOUNTER — APPOINTMENT (OUTPATIENT)
Dept: CARDIAC REHAB | Facility: HOSPITAL | Age: 79
End: 2024-06-14
Payer: COMMERCIAL

## 2024-06-18 ENCOUNTER — CLINICAL SUPPORT (OUTPATIENT)
Dept: CARDIAC REHAB | Facility: HOSPITAL | Age: 79
End: 2024-06-18
Payer: COMMERCIAL

## 2024-06-18 DIAGNOSIS — Z95.2 S/P TAVR (TRANSCATHETER AORTIC VALVE REPLACEMENT): Primary | ICD-10-CM

## 2024-06-18 PROCEDURE — 93798 PHYS/QHP OP CAR RHAB W/ECG: CPT

## 2024-06-20 ENCOUNTER — CLINICAL SUPPORT (OUTPATIENT)
Dept: CARDIAC REHAB | Facility: HOSPITAL | Age: 79
End: 2024-06-20
Payer: COMMERCIAL

## 2024-06-20 ENCOUNTER — APPOINTMENT (OUTPATIENT)
Dept: LAB | Facility: HOSPITAL | Age: 79
End: 2024-06-20

## 2024-06-20 DIAGNOSIS — Z95.2 S/P TAVR (TRANSCATHETER AORTIC VALVE REPLACEMENT): Primary | ICD-10-CM

## 2024-06-20 DIAGNOSIS — Z00.6 ENCOUNTER FOR EXAMINATION FOR NORMAL COMPARISON OR CONTROL IN CLINICAL RESEARCH PROGRAM: ICD-10-CM

## 2024-06-20 PROCEDURE — 36415 COLL VENOUS BLD VENIPUNCTURE: CPT

## 2024-06-20 PROCEDURE — 93798 PHYS/QHP OP CAR RHAB W/ECG: CPT

## 2024-06-21 ENCOUNTER — CLINICAL SUPPORT (OUTPATIENT)
Dept: CARDIAC REHAB | Facility: HOSPITAL | Age: 79
End: 2024-06-21
Payer: COMMERCIAL

## 2024-06-21 DIAGNOSIS — Z95.2 S/P TAVR (TRANSCATHETER AORTIC VALVE REPLACEMENT): Primary | ICD-10-CM

## 2024-06-21 PROCEDURE — 93798 PHYS/QHP OP CAR RHAB W/ECG: CPT

## 2024-06-25 ENCOUNTER — CLINICAL SUPPORT (OUTPATIENT)
Dept: CARDIAC REHAB | Facility: HOSPITAL | Age: 79
End: 2024-06-25
Payer: COMMERCIAL

## 2024-06-25 DIAGNOSIS — Z95.2 S/P TAVR (TRANSCATHETER AORTIC VALVE REPLACEMENT): Primary | ICD-10-CM

## 2024-06-25 PROCEDURE — 93798 PHYS/QHP OP CAR RHAB W/ECG: CPT

## 2024-06-27 ENCOUNTER — CLINICAL SUPPORT (OUTPATIENT)
Dept: CARDIAC REHAB | Facility: HOSPITAL | Age: 79
End: 2024-06-27
Payer: COMMERCIAL

## 2024-06-27 DIAGNOSIS — Z95.2 S/P TAVR (TRANSCATHETER AORTIC VALVE REPLACEMENT): Primary | ICD-10-CM

## 2024-06-27 PROCEDURE — 93798 PHYS/QHP OP CAR RHAB W/ECG: CPT

## 2024-06-28 ENCOUNTER — CLINICAL SUPPORT (OUTPATIENT)
Dept: CARDIAC REHAB | Facility: HOSPITAL | Age: 79
End: 2024-06-28
Payer: COMMERCIAL

## 2024-06-28 DIAGNOSIS — Z95.2 S/P TAVR (TRANSCATHETER AORTIC VALVE REPLACEMENT): Primary | ICD-10-CM

## 2024-06-28 PROCEDURE — 93798 PHYS/QHP OP CAR RHAB W/ECG: CPT

## 2024-07-02 ENCOUNTER — CLINICAL SUPPORT (OUTPATIENT)
Dept: CARDIAC REHAB | Facility: HOSPITAL | Age: 79
End: 2024-07-02
Payer: COMMERCIAL

## 2024-07-02 DIAGNOSIS — Z95.2 S/P TAVR (TRANSCATHETER AORTIC VALVE REPLACEMENT): Primary | ICD-10-CM

## 2024-07-02 PROCEDURE — 93798 PHYS/QHP OP CAR RHAB W/ECG: CPT

## 2024-07-05 ENCOUNTER — CLINICAL SUPPORT (OUTPATIENT)
Dept: CARDIAC REHAB | Facility: HOSPITAL | Age: 79
End: 2024-07-05
Payer: COMMERCIAL

## 2024-07-05 DIAGNOSIS — Z95.2 S/P TAVR (TRANSCATHETER AORTIC VALVE REPLACEMENT): Primary | ICD-10-CM

## 2024-07-05 LAB
APOB+LDLR+PCSK9 GENE MUT ANL BLD/T: NOT DETECTED
BRCA1+BRCA2 DEL+DUP + FULL MUT ANL BLD/T: NOT DETECTED
MLH1+MSH2+MSH6+PMS2 GN DEL+DUP+FUL M: NOT DETECTED

## 2024-07-05 PROCEDURE — 93798 PHYS/QHP OP CAR RHAB W/ECG: CPT

## 2024-07-09 ENCOUNTER — CLINICAL SUPPORT (OUTPATIENT)
Dept: CARDIAC REHAB | Facility: HOSPITAL | Age: 79
End: 2024-07-09
Payer: COMMERCIAL

## 2024-07-09 DIAGNOSIS — Z95.2 S/P TAVR (TRANSCATHETER AORTIC VALVE REPLACEMENT): Primary | ICD-10-CM

## 2024-07-09 PROCEDURE — 93798 PHYS/QHP OP CAR RHAB W/ECG: CPT

## 2024-07-09 NOTE — PROGRESS NOTES
CARDIAC REHABILITATION ASSESSMENT AND INDIVIDUALIZED TREATMENT PLAN  60 DAY          Today's date: 2024   # of Exercise Sessions Completed: 21  Patient name: Song Camargo      : 1945  Age: 79 y.o.       MRN: [id number]  Referring Physician: Jessy Wan PA*  Cardiologist: Nate Page  Provider: Juan Carlos  Clinician: Herminia Karimi MS, CEP      Comments: n/a        Dx:   Encounter Diagnosis   Name Primary?    S/P TAVR (transcatheter aortic valve replacement) Yes       Description of Diagnosis: TAVR    Date of onset: 2024    Other Cardiac History: n/a          ASSESSMENT    Medical History:   Past Medical History:   Diagnosis Date    Arthritis     Basal cell carcinoma (BCC)     Coronary artery disease     High blood pressure     High cholesterol     Lumbar radiculopathy 4/15/2024    Prostate CA (HCC)        Family History:  Family History   Problem Relation Age of Onset    Sudden death Mother         cardiac    Hypertension Father     Coronary artery disease Father     Cancer Father     Hyperlipidemia Father        Allergies:   Patient has no known allergies.    Current Medications:   Current Outpatient Medications   Medication Sig Dispense Refill    acetaminophen (TYLENOL) 325 mg tablet Take 2 tablets (650 mg total) by mouth every 4 (four) hours as needed for fever, mild pain, moderate pain or headaches (temperature greater than 101 F) (Patient taking differently: Take 650 mg by mouth every 4 (four) hours as needed for fever, mild pain, moderate pain or headaches (temperature greater than 101 F) As needed)      amLODIPine (NORVASC) 2.5 mg tablet Take 2.5 mg by mouth in the morning.      aspirin 81 mg chewable tablet Chew 1 tablet (81 mg total) daily      atorvastatin (LIPITOR) 40 mg tablet Take 1 tablet (40 mg total) by mouth daily 90 tablet 3    clopidogrel (PLAVIX) 75 mg tablet Take 1 tablet (75 mg total) by mouth daily 90 tablet 1    hydroCHLOROthiazide 12.5 mg tablet Take 1 tablet  (12.5 mg total) by mouth daily Do not start before March 14, 2024.      lisinopril (ZESTRIL) 20 mg tablet Take 1 tablet (20 mg total) by mouth daily Do not start before March 14, 2024.      pantoprazole (PROTONIX) 40 mg tablet Take 1 tablet (40 mg total) by mouth daily 30 tablet 0    polyethylene glycol (MIRALAX) 17 g packet Take 17 g by mouth daily (Patient taking differently: Take 17 g by mouth daily As needed) 510 g 0     No current facility-administered medications for this visit.       Medication compliance: Yes   Comments: Pt reports to be compliant with medications    Physical Limitations: arthritis in toes, limits incline walking. Rare sciatica pain    Fall Risk: Low   Comments: Ambulates with a steady gait with no assist device    Cultural needs: n/a      CAD Risk Factors:  Cholesterol: No  HTN: Yes  DM: No  Obesity: No   Inactivity: No      EXERCISE ASSESSMENT:     Initial Fitness Assessment:   Submaximal TM ETT:  Resting:  BP: 114/76  HR: 69, Exercise:  BP: 132/72  HR: 95, METs:  4.3, and ECG Summary: NSR      Functional Status Prior to Diagnosis for Treatment:   Occupation: retired  Recreation/Physical Activity: gardening, pickleball  ADL’s: resumed all ADLs  [place]: resumed all ADLs  Home exercise equipment:  none  Home exercise: walking approx. 1.5 miles/day  Other: n/a    Current Functional Status:  Occupation: retired  Recreation/Physical Activity: gardening, pickleball  ADL’s:resumed all ADLs  [place]: resumed all ADLs  Home exercise: walking approx. 1.5 miles/day  Other: n/a    Functional Capacity Screening Tool:  [facility] Activity Status Index:  7.01 METs      PSYCHOSOCIAL ASSESSMENT:    Depression screening:  PHQ-9 = 1    Interpretation:  1-4 = Minimal Depression  Anxiety screening:  CALVIN-7 = 2    Interpretation: 0-4  = Not anxious    Pt self-report of depression and anxiety   Patient reports they are coping well with good social support and denies depression or anxiety  Reports sufficient  "emotional support       Self-reported stress level:  2   Stressors:  TAVR surgery, caused minimal stress  Stress Management Tools:  no specific stress management tools    Quality of Life Screen:  (Higher score indicates disease impact on QOL)  [facility] COOP score: 17/40        Social Support:   family  Community/Social Activities: spending time with family     Psychosocial Assessment as it relates to rehabilitation:   Patient denies issues with his/her family or home life that may affect their rehabilitation efforts.       NUTRITION ASSESSMENT:    Weight:    Wt Readings from Last 1 Encounters:   05/31/24 79.4 kg (175 lb)        Height:   Ht Readings from Last 1 Encounters:   05/31/24 5' 9\" (1.753 m)       Rate Your Plate Score: 65/81    Diabetes: N/A  A1c: no A1c in chart    last measured: n/a    Lipid management:  no lipid profile in chart    Current Dietary Habits:  Reports he chooses low sodium foods, reads food labels when shopping, watches fat content in foods, eats fish and tuna    Drug/Alcohol Use:   N/A      OTHER CORE COMPONENT ASSESSMENT:    Tobacco Use:     N/A: Pt has a remote history of smoking    Anginal Symptoms:  lightheadedness   NTG use: No prescription        INDIVIDUALIZED TREATMENT PLAN      Patient will attend 35 monitored exercise sessions beginning 5/16/2024.    See outlined plan of care below for specific patient goals in each component of care.        EXERCISE GOAL and PLAN      SMART Goals:   10% improvement in functional capacity based on max METs achieved in initial fitness assessment  improved DASI score by 10%  increased exercise capacity by 40% based on peak METs tolerated in cardiac rehab exercise session  maintain > 150 minutes per week of moderate intensity exercise    Patient Specific EXERCISE GOALS:       resume ADLs with increased strength, attend rehab regularly, and resume yard work    Progress toward SMART and personal Exercise goals:  resuming ADLs with increased strength, " attending rehab regularly, resuming yard work, walking .5 miles daily 3-4 x/day with his dog, playing pickleball with his dog    Plan for next 30 days:    education on home exercise guidelines, home exercise 30+ mins 2 days opposite CR, and Group class: Risk Factors for Heart Disease    The patient was counseled on exercise guidelines to achieve a minimum of 150 mins/wk of moderate intensity (RPE 4-6)   exercise and encouraged to add 1-2 days of exercise on opposite days of cardiac rehab as tolerated.     Current Aerobic Exercise Prescription:      Frequency: 3 days/week   Supplement with home exercise 2+ days/wk as tolerated       Minutes: 40         METS: 2.6            HR: RHR +30-40bpm   RPE: 4-6         Modalities: Treadmill, UBE, NuStep, and Recumbent bike     Exercise workloads will be progressed gradually as tolerated, within limits of patient's ability, and according to the patient's   response to the exercise program.      Aerobic Exercise Prescription - 30 day goal:   Frequency: 3 days/week of cardiac rehab       Supplement with home exercise 2+ days/wk as tolerated    Minutes: 40 -55      >150 mins/wk of moderate intensity exercise   METS: 2.6-3.5   HR: RHR +30-40 bpm     RPE: 4-6   Modalities: Treadmill, UBE, NuStep, and Recumbent bike    Strength trainin-3 days / week  12-15 repetitions  1-2 sets per modality   Will be added following at least 8 weeks post surgery and 8-10 monitored sessions   Modalities: Pull Downs, Lateral Raise, Arm Extension, Arm Curl, and leg extensions    Home Exercise: Type: walking, Distance 1.5 miles, daily    Group and Individual Education: benefit of exercise for CAD risk factors and RPE scale     Readiness to change: Action:  (Changing behavior)      NUTRITION GOALS AND PLAN    Weight control:    Starting weight: 182.0 lbs   Current weight: 184.0 lbs    Nutritional   Reviewed patient's Rate your Plate. Discussed key elements of heart healthy eating. Reviewed patient  goals for dietary modifications and their clinical implications.  Reviewed most recent lipid profile.     SMART Goals:   eat 6 or more servings of grain products per day, eat 2 or more servings of low fat milk or yogurt a day, eat no more than 6oz of meat per day, eat red meat once a week or less, and rarely/never drink more than 1-2 alcoholic drinks per day.    Patient Specific NUTRITION GOALS:     Continue to read food labels and watch for sodium and fat content.     Patient's progress toward SMART and personal Nutrition goals:   Patient is agreeable to making dietary modifications.    Plan for next 30 days:   group class: Reading Food Labels and group Class: Heart Healthy Eating    Measurable goals were based Rate Your Plate Dietary Self-Assessment. These are the areas in which the patient could score higher on the assessment.  Goals include recommendations for a heart healthy diet based on American Heart Association.    Group and Individual Education:   heart healthy eating principles  low sodium diet    Readiness to change: Action:  (Changing behavior)      PSYCHOSOCIAL ASSESSMENT AND PLAN    Psychosocial Assessment as it relates to rehabilitation:   Patient denies issues with his/her family or home life that may affect their rehabilitation efforts.     SMART Goals:     Physical Fitness in [place] Score < 3, Overall Health in [place] Score < 3, and Change in Health in [place] Score < 3     Patient Specific PSYCHOCOSOCIAL GOALS:     spend time with family    Patient's progress toward SMART and personal Psychosocial goals:   No reported changes in psychosocial status    Plan for next 30 days:   Class: Stress and Your Health and Class: Relaxation    Group and Individual Education: benefits of a positive support system and stress management techniques    Information to utilize [employer] was provided as well as contact information for counseling through  Behavioral Health and group psychotherapy groups  available.    Readiness to change: Action:  (Changing behavior)      OTHER CORE COMPONENTS GOALS and PLAN      Blood Pressure will be monitored throughout the program and cardiologist will be notified of elevated trends.    Pt will be encouraged to monitor home BP if advised by cardiologist.    Tobacco Intervention:   N/A:  Pt has a remote history of smoking.      SMART Goals:   consistent, controlled resting BP < 130/80 and medication compliance    Patient Specific CORE COMPONENT GOALS:    reduced dietary sodium <2000mg and medication compliance    Progress toward SMART and personal Core Component goals:    Pt was experiencing episodes of symptomatic low BP. He was taken off of amlodipine and was encouraged to hydrate more. He reports he has been feeling better and has been getting higher BP readings since making those changes.     Plan for next 30 days:   group class: Understanding Heart Disease, group class: Common Heart Medications, medication compliance, and monitor home BP    Group and Individual Education:  understanding high blood pressure and it's relationship to CAD, components of blood pressure management, and group class:  Common Heart Medications    Readiness to change: Action:  (Changing behavior)

## 2024-07-11 ENCOUNTER — CLINICAL SUPPORT (OUTPATIENT)
Dept: CARDIAC REHAB | Facility: HOSPITAL | Age: 79
End: 2024-07-11
Payer: COMMERCIAL

## 2024-07-11 DIAGNOSIS — Z95.2 S/P TAVR (TRANSCATHETER AORTIC VALVE REPLACEMENT): Primary | ICD-10-CM

## 2024-07-11 PROCEDURE — 93798 PHYS/QHP OP CAR RHAB W/ECG: CPT

## 2024-07-12 ENCOUNTER — CLINICAL SUPPORT (OUTPATIENT)
Dept: CARDIAC REHAB | Facility: HOSPITAL | Age: 79
End: 2024-07-12
Payer: COMMERCIAL

## 2024-07-12 DIAGNOSIS — Z95.2 S/P TAVR (TRANSCATHETER AORTIC VALVE REPLACEMENT): Primary | ICD-10-CM

## 2024-07-12 PROCEDURE — 93798 PHYS/QHP OP CAR RHAB W/ECG: CPT

## 2024-07-16 ENCOUNTER — CLINICAL SUPPORT (OUTPATIENT)
Dept: CARDIAC REHAB | Facility: HOSPITAL | Age: 79
End: 2024-07-16
Payer: COMMERCIAL

## 2024-07-16 DIAGNOSIS — Z95.2 S/P TAVR (TRANSCATHETER AORTIC VALVE REPLACEMENT): Primary | ICD-10-CM

## 2024-07-16 PROCEDURE — 93798 PHYS/QHP OP CAR RHAB W/ECG: CPT

## 2024-07-18 ENCOUNTER — CLINICAL SUPPORT (OUTPATIENT)
Dept: CARDIAC REHAB | Facility: HOSPITAL | Age: 79
End: 2024-07-18
Payer: COMMERCIAL

## 2024-07-18 DIAGNOSIS — Z95.2 S/P TAVR (TRANSCATHETER AORTIC VALVE REPLACEMENT): Primary | ICD-10-CM

## 2024-07-18 PROCEDURE — 93798 PHYS/QHP OP CAR RHAB W/ECG: CPT

## 2024-07-19 ENCOUNTER — CLINICAL SUPPORT (OUTPATIENT)
Dept: CARDIAC REHAB | Facility: HOSPITAL | Age: 79
End: 2024-07-19
Payer: COMMERCIAL

## 2024-07-19 DIAGNOSIS — Z95.2 S/P TAVR (TRANSCATHETER AORTIC VALVE REPLACEMENT): Primary | ICD-10-CM

## 2024-07-19 PROCEDURE — 93798 PHYS/QHP OP CAR RHAB W/ECG: CPT

## 2024-07-23 ENCOUNTER — CLINICAL SUPPORT (OUTPATIENT)
Dept: CARDIAC REHAB | Facility: HOSPITAL | Age: 79
End: 2024-07-23
Payer: COMMERCIAL

## 2024-07-23 DIAGNOSIS — Z95.2 S/P TAVR (TRANSCATHETER AORTIC VALVE REPLACEMENT): Primary | ICD-10-CM

## 2024-07-23 PROCEDURE — 93798 PHYS/QHP OP CAR RHAB W/ECG: CPT

## 2024-07-25 ENCOUNTER — NURSE TRIAGE (OUTPATIENT)
Age: 79
End: 2024-07-25

## 2024-07-25 ENCOUNTER — CLINICAL SUPPORT (OUTPATIENT)
Dept: CARDIAC REHAB | Facility: HOSPITAL | Age: 79
End: 2024-07-25
Payer: COMMERCIAL

## 2024-07-25 DIAGNOSIS — Z95.2 S/P TAVR (TRANSCATHETER AORTIC VALVE REPLACEMENT): Primary | ICD-10-CM

## 2024-07-25 PROCEDURE — 93798 PHYS/QHP OP CAR RHAB W/ECG: CPT

## 2024-07-25 NOTE — TELEPHONE ENCOUNTER
Received call from pt. He states he has been having ongoing low blood pressure. His amlodipine was discontinued, and also his HCTZ. Pt is taking Lisinopril 20 mg daily.  BP remains low, he states it has been as low as 69/39. DBP ranging 42-47 at times. Pt states when this occurs, he drinks water and eats salt.  Most recent BP today 112/54 pulse 64.      Pt has been experiencing dizziness/lightheadedness and blurry vision at times.  Pt asked to be seen for an appointment tomorrow if available.  Was able to schedule pt to see Wilberto Akins PA-C for a follow up tomorrow at 8 AM.

## 2024-07-25 NOTE — TELEPHONE ENCOUNTER
"Reason for Disposition   Wants doctor to measure BP    Answer Assessment - Initial Assessment Questions  1. BLOOD PRESSURE: \"What is the blood pressure?\" \"Did you take at least two measurements 5 minutes apart?\"      112/54 pulse 64  2. ONSET: \"When did you take your blood pressure?\"      Today   3. HOW: \"How did you obtain the blood pressure?\" (e.g., visiting nurse, automatic home BP monitor)      Automatic BP monitor   4. HISTORY: \"Do you have a history of low blood pressure?\" \"What is your blood pressure normally?\"      Yes, started about 2 weeks ago  5. MEDICATIONS: \"Are you taking any medications for blood pressure?\" If Yes, ask: \"Have they been changed recently?\"      Lisinopril 20 mg daily   6. PULSE RATE: \"Do you know what your pulse rate is?\"       64  7. OTHER SYMPTOMS: \"Have you been sick recently?\" \"Have you had a recent injury?\"      Lightheadedness, dizziness, blurry vision    Protocols used: Blood Pressure - Low-ADULT-AH    "

## 2024-07-26 ENCOUNTER — CLINICAL SUPPORT (OUTPATIENT)
Dept: CARDIAC REHAB | Facility: HOSPITAL | Age: 79
End: 2024-07-26
Payer: COMMERCIAL

## 2024-07-26 ENCOUNTER — OFFICE VISIT (OUTPATIENT)
Dept: CARDIOLOGY CLINIC | Facility: CLINIC | Age: 79
End: 2024-07-26
Payer: COMMERCIAL

## 2024-07-26 VITALS
HEART RATE: 61 BPM | SYSTOLIC BLOOD PRESSURE: 130 MMHG | BODY MASS INDEX: 26.96 KG/M2 | DIASTOLIC BLOOD PRESSURE: 72 MMHG | WEIGHT: 182 LBS | HEIGHT: 69 IN

## 2024-07-26 DIAGNOSIS — I25.10 CORONARY ARTERY DISEASE INVOLVING NATIVE CORONARY ARTERY: ICD-10-CM

## 2024-07-26 DIAGNOSIS — Z95.2 S/P TAVR (TRANSCATHETER AORTIC VALVE REPLACEMENT): Primary | ICD-10-CM

## 2024-07-26 PROCEDURE — 93798 PHYS/QHP OP CAR RHAB W/ECG: CPT

## 2024-07-26 PROCEDURE — 99214 OFFICE O/P EST MOD 30 MIN: CPT | Performed by: PHYSICIAN ASSISTANT

## 2024-07-26 RX ORDER — LISINOPRIL 10 MG/1
10 TABLET ORAL DAILY
Qty: 30 TABLET | Refills: 3 | Status: SHIPPED | OUTPATIENT
Start: 2024-07-26

## 2024-07-26 NOTE — PROGRESS NOTES
"Cardiology Office Follow Up  Song Camargo  1945  [id number]      ASSESSMENT:  Urgent follow-up for low blood pressure  Previously being treated for high blood pressure with amlodipine 2.5 daily, HCTZ 12.5 daily, and lisinopril 20 daily  Severe AS status post TAVR 4/16/2024  Wu REGINE 3 ultra Resilia bioprosthetic valve via percutaneous left transfemoral approach with final TEQUILA showing well-positioned bioprosthetic transcatheter aortic valve with normal function and trace paravalvular leak  5/13/2024 echo: EF 60%, well-seated normally functioning bioprosthetic aortic valve  Nonobstructive CAD  Preserved LV function on echo  Peripheral vascular disease, mild on CTA chest abdomen pelvis    PLAN/ DISCUSSION:  History of hypertension, now with episodes of hypotension  Amlodipine and HCTZ have been discontinued (HCTZ 2 days ago, amlodipine last month)  Currently on lisinopril 20 daily  BP today is stable but yesterday after taking his dose of lisinopril his systolic dropped down to the 80s and 90s and he was symptomatic with dizziness and presyncope  We will cut the dose of lisinopril from 20 down to 10  Agree with liberation of fluid and salt intake  Continue to monitor BP and if need be we could back off further on lisinopril  No other changes were made to medications today    Interval History/ HPI:   Patient last seen by me in early May 2024.  Currently coming in to the office for an urgent visit for low blood pressure readings at home.    He is symptomatic when his blood pressure drops with dizziness and lightheadedness  He has not taken any medications today and systolic is 130  HCTZ discontinued 2 days ago by PCP  Amlodipine discontinued last month by us  He continues with cardiac rehab  No chest pain or chest pressure  Breathing is stable     Vitals:  /72 (BP Location: Left arm, Patient Position: Sitting, Cuff Size: Standard)   Pulse 61   Ht 5' 9\" (1.753 m)   Wt 82.6 kg (182 lb)   BMI 26.88 " kg/m²      Past Medical History:   Diagnosis Date    Arthritis     Basal cell carcinoma (BCC)     Coronary artery disease     High blood pressure     High cholesterol     Lumbar radiculopathy 4/15/2024    Prostate CA (HCC)      Social History     Socioeconomic History    Marital status: /Civil Union     Spouse name: Not on file    Number of children: Not on file    Years of education: Not on file    Highest education level: Not on file   Occupational History    Not on file   Tobacco Use    Smoking status: Former     Current packs/day: 0.00     Types: Cigarettes     Quit date: 2004     Years since quittin.3    Smokeless tobacco: Never   Vaping Use    Vaping status: Never Used   Substance and Sexual Activity    Alcohol use: Yes     Alcohol/week: 8.0 standard drinks of alcohol     Types: 8 Glasses of wine per week     Comment: drinks one glass of wine on the weekdays and maybe two on the weekend    Drug use: Never    Sexual activity: Not on file   Other Topics Concern    Not on file   Social History Narrative    Not on file     Social Determinants of Health     Financial Resource Strain: Not on file   Food Insecurity: No Food Insecurity (2024)    Hunger Vital Sign     Worried About Running Out of Food in the Last Year: Never true     Ran Out of Food in the Last Year: Never true   Transportation Needs: No Transportation Needs (2024)    PRAPARE - Transportation     Lack of Transportation (Medical): No     Lack of Transportation (Non-Medical): No   Physical Activity: Not on file   Stress: Not on file   Social Connections: Not on file   Intimate Partner Violence: Not on file   Housing Stability: Low Risk  (2024)    Housing Stability Vital Sign     Unable to Pay for Housing in the Last Year: No     Number of Times Moved in the Last Year: 1     Homeless in the Last Year: No      Family History   Problem Relation Age of Onset    Sudden death Mother         cardiac    Hypertension Father      Coronary artery disease Father     Cancer Father     Hyperlipidemia Father      Past Surgical History:   Procedure Laterality Date    APPENDECTOMY      CARDIAC CATHETERIZATION N/A 3/13/2024    Procedure: Cardiac RHC/LHC;  Surgeon: Mir Sharpe MD;  [facility];  Service: Cardiology    CARDIAC CATHETERIZATION N/A 4/16/2024    Procedure: Cardiac tavr;  Surgeon: Mir Sharpe MD;  Location: [facility];  Service: Cardiology    COLONOSCOPY  2014    MOHS SURGERY  2004    [place] REPLACE AORTIC VALVE OPENFEMORAL ARTERY APPROACH N/A 4/16/2024    Procedure: REPLACEMENT AORTIC VALVE TRANSCATHETER (TAVR) TRANSFEMORAL W/ 23MM OLIVEIRA REGINE S3 UR VALVE(ACCESS ON LEFT) TEQUILA;  Surgeon: Jacob Macias DO;  Location: [facility];  Service: Cardiac Surgery    PROSTATECTOMY  2010    SHOULDER SURGERY Right     TONSILLECTOMY      VASECTOMY         Current Outpatient Medications:     acetaminophen (TYLENOL) 325 mg tablet, Take 2 tablets (650 mg total) by mouth every 4 (four) hours as needed for fever, mild pain, moderate pain or headaches (temperature greater than 101 F) (Patient taking differently: Take 650 mg by mouth every 4 (four) hours as needed for fever, mild pain, moderate pain or headaches (temperature greater than 101 F) As needed), Disp: , Rfl:     amLODIPine (NORVASC) 2.5 mg tablet, Take 2.5 mg by mouth in the morning., Disp: , Rfl:     aspirin 81 mg chewable tablet, Chew 1 tablet (81 mg total) daily, Disp: , Rfl:     atorvastatin (LIPITOR) 40 mg tablet, Take 1 tablet (40 mg total) by mouth daily, Disp: 90 tablet, Rfl: 3    clopidogrel (PLAVIX) 75 mg tablet, Take 1 tablet (75 mg total) by mouth daily, Disp: 90 tablet, Rfl: 1    hydroCHLOROthiazide 12.5 mg tablet, Take 1 tablet (12.5 mg total) by mouth daily Do not start before March 14, 2024., Disp: , Rfl:     lisinopril (ZESTRIL) 20 mg tablet, Take 1 tablet (20 mg total) by mouth daily Do not start before March 14, 2024., Disp: , Rfl:     pantoprazole  (PROTONIX) 40 mg tablet, Take 1 tablet (40 mg total) by mouth daily, Disp: 30 tablet, Rfl: 0    polyethylene glycol (MIRALAX) 17 g packet, Take 17 g by mouth daily (Patient taking differently: Take 17 g by mouth daily As needed), Disp: 510 g, Rfl: 0      Review of Systems:  Review of Systems   Constitutional:  Negative for chills and fever.   HENT:  Negative for ear pain and sore throat.    Eyes:  Negative for pain and visual disturbance.   Respiratory:  Negative for cough and shortness of breath.    Cardiovascular:  Negative for chest pain and palpitations.   Gastrointestinal:  Negative for abdominal pain and vomiting.   Genitourinary:  Negative for dysuria and hematuria.   Musculoskeletal:  Negative for arthralgias and back pain.   Skin:  Negative for color change and rash.   Neurological:  Positive for dizziness. Negative for seizures and syncope.   All other systems reviewed and are negative.        Physical Exam:  Physical Exam  Constitutional:       Appearance: He is well-developed.   HENT:      Head: Normocephalic and atraumatic.   Eyes:      Pupils: Pupils are equal, round, and reactive to light.   Cardiovascular:      Rate and Rhythm: Normal rate and regular rhythm.      Heart sounds: Murmur heard.      No friction rub. No gallop.   Pulmonary:      Effort: Pulmonary effort is normal. No respiratory distress.      Breath sounds: Normal breath sounds. No wheezing or rales.   Abdominal:      General: Bowel sounds are normal. There is no distension.      Palpations: Abdomen is soft.      Tenderness: There is no abdominal tenderness.   Musculoskeletal:         General: No deformity. Normal range of motion.      Cervical back: Normal range of motion and neck supple.   Skin:     General: Skin is warm and dry.   Neurological:      Mental Status: He is alert and oriented to person, place, and time.   Psychiatric:         Behavior: Behavior normal.         This note was completed in part utilizing M-Modal Fluency  Direct Software.  Grammatical errors, random word insertions, spelling mistakes, and incomplete sentences can be an occasional consequence of this system secondary to software limitations, ambient noise, and hardware issues.  If you have any questions or concerns about the content, text, or information contained within the body of this dictation, please contact the provider for clarification.

## 2024-07-26 NOTE — PATIENT INSTRUCTIONS
Please decrease lisinopril down to 10 mg daily   Continue to monitor your blood pressure and call us if there are any issues

## 2024-07-30 ENCOUNTER — CLINICAL SUPPORT (OUTPATIENT)
Dept: CARDIAC REHAB | Facility: HOSPITAL | Age: 79
End: 2024-07-30
Payer: COMMERCIAL

## 2024-07-30 DIAGNOSIS — Z95.2 S/P TAVR (TRANSCATHETER AORTIC VALVE REPLACEMENT): Primary | ICD-10-CM

## 2024-07-30 PROCEDURE — 93798 PHYS/QHP OP CAR RHAB W/ECG: CPT

## 2024-08-01 ENCOUNTER — CLINICAL SUPPORT (OUTPATIENT)
Dept: CARDIAC REHAB | Facility: HOSPITAL | Age: 79
End: 2024-08-01
Payer: COMMERCIAL

## 2024-08-01 DIAGNOSIS — Z95.2 S/P TAVR (TRANSCATHETER AORTIC VALVE REPLACEMENT): Primary | ICD-10-CM

## 2024-08-01 PROCEDURE — 93798 PHYS/QHP OP CAR RHAB W/ECG: CPT

## 2024-08-02 ENCOUNTER — CLINICAL SUPPORT (OUTPATIENT)
Dept: CARDIAC REHAB | Facility: HOSPITAL | Age: 79
End: 2024-08-02
Payer: COMMERCIAL

## 2024-08-02 DIAGNOSIS — Z95.2 S/P TAVR (TRANSCATHETER AORTIC VALVE REPLACEMENT): Primary | ICD-10-CM

## 2024-08-02 PROCEDURE — 93798 PHYS/QHP OP CAR RHAB W/ECG: CPT

## 2024-08-06 ENCOUNTER — CLINICAL SUPPORT (OUTPATIENT)
Dept: CARDIAC REHAB | Facility: HOSPITAL | Age: 79
End: 2024-08-06
Payer: COMMERCIAL

## 2024-08-06 DIAGNOSIS — Z95.2 S/P TAVR (TRANSCATHETER AORTIC VALVE REPLACEMENT): Primary | ICD-10-CM

## 2024-08-06 PROCEDURE — 93798 PHYS/QHP OP CAR RHAB W/ECG: CPT

## 2024-08-07 ENCOUNTER — OFFICE VISIT (OUTPATIENT)
Dept: OBGYN CLINIC | Facility: CLINIC | Age: 79
End: 2024-08-07
Payer: COMMERCIAL

## 2024-08-07 ENCOUNTER — APPOINTMENT (OUTPATIENT)
Dept: RADIOLOGY | Facility: CLINIC | Age: 79
End: 2024-08-07
Payer: COMMERCIAL

## 2024-08-07 VITALS
DIASTOLIC BLOOD PRESSURE: 65 MMHG | SYSTOLIC BLOOD PRESSURE: 129 MMHG | WEIGHT: 185 LBS | HEIGHT: 69 IN | BODY MASS INDEX: 27.4 KG/M2

## 2024-08-07 DIAGNOSIS — M18.11 ARTHRITIS OF CARPOMETACARPAL (CMC) JOINT OF RIGHT THUMB: Primary | ICD-10-CM

## 2024-08-07 DIAGNOSIS — M18.11 ARTHRITIS OF CARPOMETACARPAL (CMC) JOINT OF RIGHT THUMB: ICD-10-CM

## 2024-08-07 DIAGNOSIS — M65.311 TRIGGER THUMB OF RIGHT HAND: ICD-10-CM

## 2024-08-07 PROCEDURE — 99203 OFFICE O/P NEW LOW 30 MIN: CPT | Performed by: ORTHOPAEDIC SURGERY

## 2024-08-07 PROCEDURE — 73140 X-RAY EXAM OF FINGER(S): CPT

## 2024-08-07 NOTE — PROGRESS NOTES
Assessment/Plan:  1. Arthritis of carpometacarpal (CMC) joint of right thumb  XR thumb right first digit-min 2v      2. Trigger thumb of right hand            Subjective history, physical examination performed, diagnostic imaging reviewed at today's visit  Diagnosis was discussed  Treatment options were discussed which included nonoperative treatment.    Risks, benefits, and realistic expectations for treatment options were discussed.    The patient was given an opportunity to ask questions.  Questions were answered to the patient's satisfaction.    Through shared decision making, the patient decided to move forward with conservative treatment. I provided him with a comfort cool thumb spica at today's visit. He can continue to use Voltaren gel.   We discussed if his symptoms associated with trigger thumb fail to improve or get worse, we can consider a cortisone injection in the future.  Follow up as needed      cc: right thumb pain    Subjective:   Song Camargo is a right hand dominant 79 y.o. male who presents for right thumb pain. He has had pain in the thumb for the last 3+ weeks. He notes having pain over the CMC joint of the right thumb. He described tenderness to palpation over the A1 pulley of the right thumb. He notes having an occasional triggering event, where his thumb will lock up on him. He notes this pain is starting to affect his .      Review of Systems   Constitutional:  Negative for chills and fever.   HENT:  Negative for ear pain and sore throat.    Eyes:  Negative for pain and visual disturbance.   Respiratory:  Negative for cough and shortness of breath.    Cardiovascular:  Negative for chest pain and palpitations.   Gastrointestinal:  Negative for abdominal pain and vomiting.   Genitourinary:  Negative for dysuria and hematuria.   Musculoskeletal:  Negative for arthralgias and back pain.   Skin:  Negative for color change and rash.   Neurological:  Negative for seizures and syncope.    All other systems reviewed and are negative.        Past Medical History:   Diagnosis Date    Arthritis     Basal cell carcinoma (BCC)     Coronary artery disease     High blood pressure     High cholesterol     Lumbar radiculopathy 4/15/2024    Prostate CA (HCC)        Past Surgical History:   Procedure Laterality Date    APPENDECTOMY      CARDIAC CATHETERIZATION N/A 3/13/2024    Procedure: Cardiac RHC/LHC;  Surgeon: Mir Sharpe MD;  [facility];  Service: Cardiology    CARDIAC CATHETERIZATION N/A 2024    Procedure: Cardiac tavr;  Surgeon: Mir Sharpe MD;  [facility];  Service: Cardiology    COLONOSCOPY  2014    [facility]      [place] REPLACE AORTIC VALVE OPENFEMORAL ARTERY APPROACH N/A 2024    Procedure: REPLACEMENT AORTIC VALVE TRANSCATHETER (TAVR) TRANSFEMORAL W/ 23MM OLIVEIRA REGINE S3 UR VALVE(ACCESS ON LEFT) TEQUILA;  Surgeon: Jacob Macias DO;  [facility];  Service: Cardiac Surgery    PROSTATECTOMY  2010    SHOULDER SURGERY Right     TONSILLECTOMY      VASECTOMY         Family History   Problem Relation Age of Onset    Sudden death Mother         cardiac    Hypertension Father     Coronary artery disease Father     Cancer Father     Hyperlipidemia Father        Social History     Occupational History    Not on file   Tobacco Use    Smoking status: Former     Current packs/day: 0.00     Types: Cigarettes     Quit date: 2004     Years since quittin.3    Smokeless tobacco: Never   Vaping Use    Vaping status: Never Used   Substance and Sexual Activity    Alcohol use: Yes     Alcohol/week: 8.0 standard drinks of alcohol     Types: 8 Glasses of wine per week     Comment: drinks one glass of wine on the weekdays and maybe two on the weekend    Drug use: Never    Sexual activity: Not on file         Current Outpatient Medications:     acetaminophen (TYLENOL) 325 mg tablet, Take 2 tablets (650 mg total) by mouth every 4 (four) hours as needed for  fever, mild pain, moderate pain or headaches (temperature greater than 101 F) (Patient taking differently: Take 650 mg by mouth every 4 (four) hours as needed for fever, mild pain, moderate pain or headaches (temperature greater than 101 F) As needed), Disp: , Rfl:     aspirin 81 mg chewable tablet, Chew 1 tablet (81 mg total) daily, Disp: , Rfl:     atorvastatin (LIPITOR) 40 mg tablet, Take 1 tablet (40 mg total) by mouth daily, Disp: 90 tablet, Rfl: 3    clopidogrel (PLAVIX) 75 mg tablet, Take 1 tablet (75 mg total) by mouth daily (Patient not taking: Reported on 7/26/2024), Disp: 90 tablet, Rfl: 1    lisinopril (ZESTRIL) 10 mg tablet, Take 1 tablet (10 mg total) by mouth daily, Disp: 30 tablet, Rfl: 3    pantoprazole (PROTONIX) 40 mg tablet, Take 1 tablet (40 mg total) by mouth daily (Patient not taking: Reported on 7/26/2024), Disp: 30 tablet, Rfl: 0    polyethylene glycol (MIRALAX) 17 g packet, Take 17 g by mouth daily (Patient taking differently: Take 17 g by mouth daily As needed), Disp: 510 g, Rfl: 0    No Known Allergies    Objective:  Vitals:    08/07/24 1331   BP: 129/65       The patient was awake, alert, and oriented to person, place, and time.  No acute distress.  Normocephalic.  EOMI.  Mucous membranes moist.  Normal respiratory effort.    Examination of the affected extremity was compared to the unaffected extremity.  Skin was intact.  No swelling or ecchymosis.  No deformity.  Hand and fingers were warm and well-perfused.  Capillary refill was brisk.  Full active range of motion of the elbows, forearms, wrists, and fingers.  5/5 elbow flexors/extensors, wrist flexor/extensors, and .       TTP over A1 pulley of  right thumb  Locking/catching not observed during the exam  MCP of right thumb stable at full extension and at 30 degrees of flexion    Negative shoulder sign. Negative thumb metacarpal adduction.  Negative compensatory MCP joint hyperextension. There was tenderness at the thumb CMC  joint. Grind test was Negative. Axial loading of the thumb produced No Pain.     Imaging/Diagnostic Studies:    I reviewed imaging studies dated 8/7/2024 which included right thumb.  These images studies demonstrated mild degenerative changes of CMC joint.        This document was created using speech voice recognition software.   Grammatical errors, random word insertions, pronoun errors, and incomplete sentences are an occasional consequence of this system due to software limitations, ambient noise, and hardware issues.   Any formal questions or concerns about content, text, or information contained within the body of this dictation should be directly addressed to the provider for clarification.

## 2024-08-08 ENCOUNTER — CLINICAL SUPPORT (OUTPATIENT)
Dept: CARDIAC REHAB | Facility: HOSPITAL | Age: 79
End: 2024-08-08
Payer: COMMERCIAL

## 2024-08-08 DIAGNOSIS — I48.0 PAF (PAROXYSMAL ATRIAL FIBRILLATION) (HCC): Primary | ICD-10-CM

## 2024-08-08 DIAGNOSIS — Z95.2 S/P TAVR (TRANSCATHETER AORTIC VALVE REPLACEMENT): Primary | ICD-10-CM

## 2024-08-08 PROCEDURE — 93798 PHYS/QHP OP CAR RHAB W/ECG: CPT

## 2024-08-08 RX ORDER — METOPROLOL SUCCINATE 25 MG/1
25 TABLET, EXTENDED RELEASE ORAL DAILY
Qty: 90 TABLET | Refills: 3 | Status: SHIPPED | OUTPATIENT
Start: 2024-08-08

## 2024-08-08 NOTE — PROGRESS NOTES
Patient noted to be in Afib at today's exercise sessions. Dr. Page made aware and he sent metoprolol and Xarelto to pharmacy. Exercise deferred for today. Dr. Page will call patient later today.  Exercise session detail attached.

## 2024-08-09 ENCOUNTER — APPOINTMENT (OUTPATIENT)
Dept: CARDIAC REHAB | Facility: HOSPITAL | Age: 79
End: 2024-08-09
Payer: COMMERCIAL

## 2024-08-09 ENCOUNTER — TELEPHONE (OUTPATIENT)
Dept: CARDIOLOGY CLINIC | Facility: CLINIC | Age: 79
End: 2024-08-09

## 2024-08-09 DIAGNOSIS — I48.0 PAF (PAROXYSMAL ATRIAL FIBRILLATION) (HCC): ICD-10-CM

## 2024-08-09 NOTE — TELEPHONE ENCOUNTER
Xarelto 20mg  [facility]  [place]    **Pt requested RX to be filled through here instead**

## 2024-08-12 ENCOUNTER — NURSE TRIAGE (OUTPATIENT)
Age: 79
End: 2024-08-12

## 2024-08-13 ENCOUNTER — APPOINTMENT (OUTPATIENT)
Dept: CARDIAC REHAB | Facility: HOSPITAL | Age: 79
End: 2024-08-13
Payer: COMMERCIAL

## 2024-08-13 ENCOUNTER — CLINICAL SUPPORT (OUTPATIENT)
Dept: CARDIAC REHAB | Facility: HOSPITAL | Age: 79
End: 2024-08-13
Payer: COMMERCIAL

## 2024-08-13 DIAGNOSIS — Z95.2 S/P TAVR (TRANSCATHETER AORTIC VALVE REPLACEMENT): Primary | ICD-10-CM

## 2024-08-13 PROCEDURE — 93798 PHYS/QHP OP CAR RHAB W/ECG: CPT

## 2024-08-15 ENCOUNTER — APPOINTMENT (OUTPATIENT)
Dept: CARDIAC REHAB | Facility: HOSPITAL | Age: 79
End: 2024-08-15
Payer: COMMERCIAL

## 2024-08-15 ENCOUNTER — CLINICAL SUPPORT (OUTPATIENT)
Dept: CARDIAC REHAB | Facility: HOSPITAL | Age: 79
End: 2024-08-15
Payer: COMMERCIAL

## 2024-08-15 DIAGNOSIS — I25.10 CORONARY ARTERY DISEASE INVOLVING NATIVE CORONARY ARTERY: ICD-10-CM

## 2024-08-15 DIAGNOSIS — Z95.2 S/P TAVR (TRANSCATHETER AORTIC VALVE REPLACEMENT): Primary | ICD-10-CM

## 2024-08-15 PROCEDURE — 93798 PHYS/QHP OP CAR RHAB W/ECG: CPT

## 2024-08-15 RX ORDER — LISINOPRIL 5 MG/1
5 TABLET ORAL DAILY
Qty: 30 TABLET | Refills: 3 | Status: SHIPPED | OUTPATIENT
Start: 2024-08-15

## 2024-08-15 NOTE — PROGRESS NOTES
CARDIAC REHABILITATION ASSESSMENT AND INDIVIDUALIZED TREATMENT PLAN  DISCHARGE            Today's date: 8/15/2024   # of Exercise Sessions Completed: 36  Patient name: Song Camargo      : 1945  Age: 79 y.o.       MRN: [id number]  Referring Physician: Jessy Wan PA*  Cardiologist: Nate Page  Provider: Juan Carlos  Clinician: Iraida Maza, MS, CEP      Comments: Darian has attended 36 sessions of CR and is being discharged at this time. He has been able to increase his peak METs in CR by 43.3%. He requested not to do the treadmill today due to foot pain, so his discharge ETT was not completed. He reports that he will continue his exercise post-discharge at his local gym utilizing the same machines that he has been using in rehab. He recently has has medication changes so he will continue to monitor his BP at home and reports any changes to hi cardiologist office.         Dx:   Encounter Diagnosis   Name Primary?    S/P TAVR (transcatheter aortic valve replacement) Yes       Description of Diagnosis: TAVR    Date of onset: 2024    Other Cardiac History: n/a          ASSESSMENT    Medical History:   Past Medical History:   Diagnosis Date    Arthritis     Basal cell carcinoma (BCC)     Coronary artery disease     High blood pressure     High cholesterol     Lumbar radiculopathy 4/15/2024    Prostate CA (HCC)        Family History:  Family History   Problem Relation Age of Onset    Sudden death Mother         cardiac    Hypertension Father     Coronary artery disease Father     Cancer Father     Hyperlipidemia Father        Allergies:   Patient has no known allergies.    Current Medications:   Current Outpatient Medications   Medication Sig Dispense Refill    acetaminophen (TYLENOL) 325 mg tablet Take 2 tablets (650 mg total) by mouth every 4 (four) hours as needed for fever, mild pain, moderate pain or headaches (temperature greater than 101 F) (Patient taking differently: Take 650 mg by mouth  every 4 (four) hours as needed for fever, mild pain, moderate pain or headaches (temperature greater than 101 F) As needed)      aspirin 81 mg chewable tablet Chew 1 tablet (81 mg total) daily      atorvastatin (LIPITOR) 40 mg tablet Take 1 tablet (40 mg total) by mouth daily 90 tablet 3    clopidogrel (PLAVIX) 75 mg tablet Take 1 tablet (75 mg total) by mouth daily (Patient not taking: Reported on 7/26/2024) 90 tablet 1    lisinopril (ZESTRIL) 5 mg tablet Take 1 tablet (5 mg total) by mouth daily 30 tablet 3    metoprolol succinate (TOPROL-XL) 25 mg 24 hr tablet Take 1 tablet (25 mg total) by mouth daily 90 tablet 3    pantoprazole (PROTONIX) 40 mg tablet Take 1 tablet (40 mg total) by mouth daily (Patient not taking: Reported on 7/26/2024) 30 tablet 0    polyethylene glycol (MIRALAX) 17 g packet Take 17 g by mouth daily (Patient taking differently: Take 17 g by mouth daily As needed) 510 g 0    rivaroxaban (Xarelto) 20 mg tablet Take 1 tablet (20 mg total) by mouth daily with breakfast 90 tablet 3     No current facility-administered medications for this visit.       Medication compliance: Yes   Comments: Pt reports to be compliant with medications    Physical Limitations: arthritis in toes, limits incline walking. Rare sciatica pain    Fall Risk: Low   Comments: Ambulates with a steady gait with no assist device    Cultural needs: n/a      CAD Risk Factors:  Cholesterol: No  HTN: Yes  DM: No  Obesity: No   Inactivity: No      EXERCISE ASSESSMENT:     Initial Fitness Assessment: was not completed at discharge due to patient request not to use TM d/t foot pain   Submaximal TM ETT:  Resting:  BP: 114/76  HR: 69, Exercise:  BP: 132/72  HR: 95, METs:  4.3, and ECG Summary: NSR      Functional Status Prior to Diagnosis for Treatment:   Occupation: retired  Recreation/Physical Activity: gardening, pickleball  ADL’s: resumed all ADLs  [place]: resumed all ADLs  Home exercise equipment:  none  Home exercise: walking  "approx. 1.5 miles/day  Other: n/a    Current Functional Status:  Occupation: retired  Recreation/Physical Activity: gardening, pickleball  ADL’s:resumed all ADLs  [place]: resumed all ADLs  Home exercise: walking approx. 1.5 miles/day  Other: n/a    Functional Capacity Screening Tool:  [facility] Activity Status Index:  7.99 METs      PSYCHOSOCIAL ASSESSMENT:    Last assessed 8/15/2024  Depression screening:  PHQ-9 = 1    Interpretation:  1-4 = Minimal Depression  Anxiety screening:  CALVIN-7 = 21   Interpretation: 0-4  = Not anxious    Pt self-report of depression and anxiety   Patient reports they are coping well with good social support and denies depression or anxiety  Reports sufficient emotional support       Self-reported stress level:  2   Stressors:  TAVR surgery, caused minimal stress  Stress Management Tools:  no specific stress management tools    Quality of Life Screen:  (Higher score indicates disease impact on QOL)  [facility] score: 17/40        Social Support:   family  Community/Social Activities: spending time with family     Psychosocial Assessment as it relates to rehabilitation:   Patient denies issues with his/her family or home life that may affect their rehabilitation efforts.       NUTRITION ASSESSMENT:    Weight:  187 lb       Height:   Ht Readings from Last 1 Encounters:   08/07/24 5' 9\" (1.753 m)       Rate Your Plate Score: 65/81    Diabetes: N/A  A1c: no A1c in chart    last measured: n/a    Lipid management:  no lipid profile in chart    Current Dietary Habits:  Reports he chooses low sodium foods, reads food labels when shopping, watches fat content in foods, eats fish and tuna    Drug/Alcohol Use:   N/A      OTHER CORE COMPONENT ASSESSMENT:    Tobacco Use:     N/A: Pt has a remote history of smoking    Anginal Symptoms:  lightheadedness   NTG use: No prescription        INDIVIDUALIZED TREATMENT PLAN      Patient has attended 36 sessions of CR and is being discharged at this time. "       EXERCISE GOAL and PLAN      SMART Goals:   10% improvement in functional capacity based on max METs achieved in initial fitness assessment  improved DASI score by 10%  increased exercise capacity by 40% based on peak METs tolerated in cardiac rehab exercise session  maintain > 150 minutes per week of moderate intensity exercise    Patient Specific EXERCISE GOALS:       resume ADLs with increased strength, attend rehab regularly, and resume yard work    Progress toward SMART and personal Exercise goals:  resuming ADLs with increased strength, attending rehab regularly, resuming yard work, walking .5 miles daily 3-4 x/day with his dog, playing pickleball with his dog. Peak METs in rehab increased by 43.3%, maintaining >150 min/wk of moderate intensity exercise, DASI score increased by 23.1%    Plan for next 30 days:    After discharge patient will continue to follow a regular exercise regimen with the goal of a minimum of 150 minutes per week of moderate intensity exercise.      The patient was counseled on exercise guidelines to achieve a minimum of 150 mins/wk of moderate intensity (RPE 4-6)   exercise and encouraged to add 1-2 days of exercise on opposite days of cardiac rehab as tolerated.     Current Aerobic Exercise Prescription:      Frequency: 3 days/week   Supplement with home exercise 2+ days/wk as tolerated       Minutes: 40-45         METS: 2.9-4.3            HR: RHR +30-40bpm   RPE: 4-6         Modalities: Treadmill, UBE, NuStep, and Recumbent bike     Exercise workloads will be progressed gradually as tolerated, within limits of patient's ability, and according to the patient's   response to the exercise program.    Strength trainin-3 days / week  12-15 repetitions  1-2 sets per modality    Modalities: Pull Downs, Lateral Raise, Arm Extension, Arm Curl, and leg extensions    Home Exercise: Type: walking, Distance 1.5 miles, daily    Group and Individual Education: benefit of exercise for CAD  risk factors, home exercise guidelines, AHA guidelines to achieve >150 mins/wk of moderate exercise, RPE scale, Group class: Risk Factors for Heart Disease, and exercise instructions/guidelines for discharge      Readiness to change: Maintenance: (Maintaining the behavior change)      NUTRITION GOALS AND PLAN    Weight control:    Starting weight: 182.0 lbs   Current weight: 187.0 lbs    Nutritional   Reviewed patient's Rate your Plate. Discussed key elements of heart healthy eating. Reviewed patient goals for dietary modifications and their clinical implications.  Reviewed most recent lipid profile.     SMART Goals:   eat 6 or more servings of grain products per day, eat 2 or more servings of low fat milk or yogurt a day, eat no more than 6oz of meat per day, eat red meat once a week or less, and rarely/never drink more than 1-2 alcoholic drinks per day.    Patient Specific NUTRITION GOALS:     Continue to read food labels and watch for sodium and fat content.     Patient's progress toward SMART and personal Nutrition goals:   Patient is agreeable to making dietary modifications. and has made heart healthy diet changes reading food labels and watching for sodium and fat content.    Plan for next 30 days:   After discharge patient will continue to maintain healthy lifestyle habits and focus on risk factor modification.    Measurable goals were based Rate Your Plate Dietary Self-Assessment. These are the areas in which the patient could score higher on the assessment.  Goals include recommendations for a heart healthy diet based on American Heart Association.    Group and Individual Education:   heart healthy eating principles  low sodium diet  group class: Heart Healthy Eating  group class:  Label Reading    Readiness to change: Maintenance: (Maintaining the behavior change)      PSYCHOSOCIAL ASSESSMENT AND PLAN    Psychosocial Assessment as it relates to rehabilitation:   Patient denies issues with his/her family  or home life that may affect their rehabilitation efforts.     SMART Goals:     Physical Fitness in [place] Score < 3, Overall Health in [place] Score < 3, and Change in Health in [place] Score < 3     Patient Specific PSYCHOCOSOCIAL GOALS:     spend time with family    Patient's progress toward SMART and personal Psychosocial goals:   Patient will be encouraged to maintain lifestyle modifications following discharge.  and physical fitness in [place] score =2, overall health in [place] score =3, change in health in [place] score =2    Plan for next 30 days:   After discharge patient will continue to maintain healthy lifestyle habits and focus on risk factor modification.    Group and Individual Education: benefits of a positive support system and stress management techniques    Information to utilize [employer] was provided as well as contact information for counseling through  Behavioral Health and group psychotherapy groups available.    Readiness to change: Maintenance: (Maintaining the behavior change)      OTHER CORE COMPONENTS GOALS and PLAN      Blood Pressure will be monitored throughout the program and cardiologist will be notified of elevated trends.    Pt will be encouraged to monitor home BP if advised by cardiologist.    Tobacco Intervention:   N/A:  Pt has a remote history of smoking.      SMART Goals:   consistent, controlled resting BP < 130/80 and medication compliance    Patient Specific CORE COMPONENT GOALS:    reduced dietary sodium <2000mg and medication compliance    Progress toward SMART and personal Core Component goals:   Patient will be encouraged to maintain lifestyle modifications following discharge.  and Pt has recently started lisinopril and will continue to monitor his BP at home. He will notify cardiologist if there are any abnormal trends. Medication compliance reported     Plan for next 30 days:   After discharge patient will continue to maintain healthy lifestyle  habits and focus on risk factor modification.    Group and Individual Education:  understanding high blood pressure and it's relationship to CAD, components of blood pressure management, and group class:  Common Heart Medications    Readiness to change: Maintenance: (Maintaining the behavior change)

## 2024-08-20 ENCOUNTER — APPOINTMENT (OUTPATIENT)
Dept: CARDIAC REHAB | Facility: HOSPITAL | Age: 79
End: 2024-08-20
Payer: COMMERCIAL

## 2024-09-11 ENCOUNTER — OFFICE VISIT (OUTPATIENT)
Dept: CARDIOLOGY CLINIC | Facility: CLINIC | Age: 79
End: 2024-09-11
Payer: COMMERCIAL

## 2024-09-11 VITALS
DIASTOLIC BLOOD PRESSURE: 62 MMHG | HEART RATE: 57 BPM | HEIGHT: 69 IN | SYSTOLIC BLOOD PRESSURE: 106 MMHG | WEIGHT: 184 LBS | BODY MASS INDEX: 27.25 KG/M2

## 2024-09-11 DIAGNOSIS — I25.10 CORONARY ARTERY DISEASE INVOLVING NATIVE CORONARY ARTERY OF NATIVE HEART WITHOUT ANGINA PECTORIS: ICD-10-CM

## 2024-09-11 DIAGNOSIS — I48.0 PAF (PAROXYSMAL ATRIAL FIBRILLATION) (HCC): Primary | ICD-10-CM

## 2024-09-11 DIAGNOSIS — Z95.2 S/P TAVR (TRANSCATHETER AORTIC VALVE REPLACEMENT): ICD-10-CM

## 2024-09-11 DIAGNOSIS — E78.2 MIXED HYPERLIPIDEMIA: ICD-10-CM

## 2024-09-11 DIAGNOSIS — I10 BENIGN ESSENTIAL HYPERTENSION: ICD-10-CM

## 2024-09-11 PROCEDURE — 99214 OFFICE O/P EST MOD 30 MIN: CPT | Performed by: INTERNAL MEDICINE

## 2024-09-11 PROCEDURE — G2211 COMPLEX E/M VISIT ADD ON: HCPCS | Performed by: INTERNAL MEDICINE

## 2024-09-11 RX ORDER — LISINOPRIL 20 MG/1
TABLET ORAL
COMMUNITY
Start: 2024-08-18

## 2024-09-11 RX ORDER — AMOXICILLIN 500 MG/1
2000 CAPSULE ORAL ONCE
Qty: 4 CAPSULE | Refills: 3 | Status: SHIPPED | OUTPATIENT
Start: 2024-09-11 | End: 2024-09-11

## 2024-09-11 RX ORDER — FLECAINIDE ACETATE 50 MG/1
50 TABLET ORAL 2 TIMES DAILY
Qty: 180 TABLET | Refills: 3 | Status: SHIPPED | OUTPATIENT
Start: 2024-09-11

## 2024-09-11 RX ORDER — AMLODIPINE BESYLATE 2.5 MG/1
TABLET ORAL
COMMUNITY
Start: 2024-08-28 | End: 2024-09-11 | Stop reason: ALTCHOICE

## 2024-09-11 NOTE — PROGRESS NOTES
Cardiology Follow Up    Song Camargo  1945  [id number]  [facility] [place] [id number]  [facility] [address]  [facility] [id number] [phone]    1. PAF (paroxysmal atrial fibrillation) (HCC)  AMB extended holter monitor    flecainide (TAMBOCOR) 50 mg tablet      2. S/P TAVR (transcatheter aortic valve replacement)  amoxicillin (AMOXIL) 500 mg capsule      3. Benign essential hypertension        4. Coronary artery disease involving native coronary artery of native heart without angina pectoris        5. Mixed hyperlipidemia            Interval History: Followup for PAF, TAVR.    No chest pain or dyspnea. He has occasional palpitations c/w PAF.     Medical Problems       Problem List       Aortic valve stenosis    Benign essential hypertension    Overview Signed 3/4/2024  2:22 PM by Jessy Wan PA-C     Last Assessment & Plan:    Formatting of this note might be different from the original.   Stable on home anti-HTN medications.         Family history of ischemic heart disease (IHD)    History of prostate cancer    Mixed hyperlipidemia    Overview Signed 3/4/2024  2:22 PM by Jessy Wan PA-C     Last Assessment & Plan:    Formatting of this note might be different from the original.   Continue statin therapy home dose.         Nonrheumatic aortic valve stenosis    Coronary artery disease involving native coronary artery    Lumbar radiculopathy    S/P TAVR (transcatheter aortic valve replacement)    Daily consumption of alcohol    Peripheral vascular disease, unspecified (HCC)        Past Medical History:   Diagnosis Date    Arthritis     Basal cell carcinoma (BCC)     Coronary artery disease     High blood pressure     High cholesterol     Lumbar radiculopathy 4/15/2024    Prostate CA (HCC)      Social History     Socioeconomic History    Marital status: /Civil Union     Spouse name: Not on file    Number of  children: Not on file    Years of education: Not on file    Highest education level: Not on file   Occupational History    Not on file   Tobacco Use    Smoking status: Former     Current packs/day: 0.00     Types: Cigarettes     Quit date: 2004     Years since quittin.4    Smokeless tobacco: Never   Vaping Use    Vaping status: Never Used   Substance and Sexual Activity    Alcohol use: Yes     Alcohol/week: 8.0 standard drinks of alcohol     Types: 8 Glasses of wine per week     Comment: drinks one glass of wine on the weekdays and maybe two on the weekend    Drug use: Never    Sexual activity: Not on file   Other Topics Concern    Not on file   Social History Narrative    Not on file     Social Determinants of Health     Financial Resource Strain: Not on file   Food Insecurity: No Food Insecurity (2024)    Hunger Vital Sign     Worried About Running Out of Food in the Last Year: Never true     Ran Out of Food in the Last Year: Never true   Transportation Needs: No Transportation Needs (2024)    PRAPARE - Transportation     Lack of Transportation (Medical): No     Lack of Transportation (Non-Medical): No   Physical Activity: Not on file   Stress: Not on file   Social Connections: Not on file   Intimate Partner Violence: Not on file   Housing Stability: Low Risk  (2024)    Housing Stability Vital Sign     Unable to Pay for Housing in the Last Year: No     Number of Times Moved in the Last Year: 1     Homeless in the Last Year: No      Family History   Problem Relation Age of Onset    Sudden death Mother         cardiac    Hypertension Father     Coronary artery disease Father     Cancer Father     Hyperlipidemia Father      Past Surgical History:   Procedure Laterality Date    APPENDECTOMY      CARDIAC CATHETERIZATION N/A 3/13/2024    Procedure: Cardiac RHC/LHC;  Surgeon: Mir Sharpe MD;  Location: [facility];  Service: Cardiology    CARDIAC CATHETERIZATION N/A 2024     Procedure: Cardiac tavr;  Surgeon: Mir Sharpe MD;  Location: [facility]IN [facility];  Service: Cardiology    [facility]  [facility]    NM REPLACE AORTIC VALVE OPENFEMORAL ARTERY APPROACH N/A 4/16/2024    Procedure: REPLACEMENT AORTIC VALVE TRANSCATHETER (TAVR) TRANSFEMORAL W/ 23MM OLIVEIRA REGINE S3 UR VALVE(ACCESS ON LEFT) TEQUILA;  Surgeon: Jacob Macias DO;  Location: [facility] MA[facility];  Service: Cardiac Surgery    PROSTATECTOMY  2010    SHOULDER SURGERY Right     TONSILLECTOMY      VASECTOMY         Current Outpatient Medications:     amoxicillin (AMOXIL) 500 mg capsule, Take 4 capsules (2,000 mg total) by mouth 1 (one) time for 1 dose, Disp: 4 capsule, Rfl: 3    aspirin 81 mg chewable tablet, Chew 1 tablet (81 mg total) daily, Disp: , Rfl:     atorvastatin (LIPITOR) 40 mg tablet, Take 1 tablet (40 mg total) by mouth daily, Disp: 90 tablet, Rfl: 3    flecainide (TAMBOCOR) 50 mg tablet, Take 1 tablet (50 mg total) by mouth 2 (two) times a day, Disp: 180 tablet, Rfl: 3    lisinopril (ZESTRIL) 20 mg tablet, , Disp: , Rfl:     lisinopril (ZESTRIL) 5 mg tablet, Take 1 tablet (5 mg total) by mouth daily, Disp: 30 tablet, Rfl: 3    metoprolol succinate (TOPROL-XL) 25 mg 24 hr tablet, Take 1 tablet (25 mg total) by mouth daily, Disp: 90 tablet, Rfl: 3    polyethylene glycol (MIRALAX) 17 g packet, Take 17 g by mouth daily (Patient taking differently: Take 17 g by mouth daily As needed), Disp: 510 g, Rfl: 0    rivaroxaban (Xarelto) 20 mg tablet, Take 1 tablet (20 mg total) by mouth daily with breakfast, Disp: 90 tablet, Rfl: 3    acetaminophen (TYLENOL) 325 mg tablet, Take 2 tablets (650 mg total) by mouth every 4 (four) hours as needed for fever, mild pain, moderate pain or headaches (temperature greater than 101 F) (Patient not taking: Reported on 9/11/2024), Disp: , Rfl:   No Known Allergies    Labs:     Chemistry        Component Value Date/Time    K 3.7 05/10/2024 1042     05/10/2024 1042    CO2 28 05/10/2024  "1042    CO2 24 04/16/2024 1216    BUN 14 05/10/2024 1042    CREATININE 0.76 05/10/2024 1042        Component Value Date/Time    CALCIUM 8.6 05/10/2024 1042    ALKPHOS 40 04/09/2024 1310    AST 19 04/09/2024 1310    ALT 24 04/09/2024 1310            No results found for: \"CHOL\"  No results found for: \"HDL\"  No results found for: \"LDLCALC\"  No results found for: \"TRIG\"  No results found for: \"CHOLHDL\"    Imaging: No results found.    EKG: .    Review of Systems   Constitutional: Negative.   HENT: Negative.     Eyes: Negative.    Cardiovascular:  Positive for palpitations.   Respiratory: Negative.     Endocrine: Negative.    Hematologic/Lymphatic: Negative.    Skin: Negative.    Musculoskeletal: Negative.    Gastrointestinal: Negative.    Genitourinary: Negative.    Neurological: Negative.    Psychiatric/Behavioral: Negative.     Allergic/Immunologic: Negative.        Vitals:    09/11/24 1357   BP: 106/62   Pulse: 57           Physical Exam  Vitals reviewed.   Constitutional:       Appearance: Normal appearance.   HENT:      Head: Normocephalic.      Nose: Nose normal.      Mouth/Throat:      Mouth: Mucous membranes are moist.      Pharynx: Oropharynx is clear.   Eyes:      General: No scleral icterus.     Conjunctiva/sclera: Conjunctivae normal.   Cardiovascular:      Rate and Rhythm: Normal rate and regular rhythm.      Heart sounds: No murmur heard.     No friction rub. No gallop.   Pulmonary:      Effort: Pulmonary effort is normal. No respiratory distress.      Breath sounds: Normal breath sounds. No wheezing or rales.   Abdominal:      General: Abdomen is flat. Bowel sounds are normal. There is no distension.      Palpations: Abdomen is soft.      Tenderness: There is no abdominal tenderness. There is no guarding.   Musculoskeletal:      Cervical back: Normal range of motion and neck supple.      Right lower leg: No edema.      Left lower leg: No edema.   Skin:     General: Skin is warm and dry.   Neurological: "      General: No focal deficit present.      Mental Status: He is alert and oriented to person, place, and time.   Psychiatric:         Mood and Affect: Mood normal.         Behavior: Behavior normal.         Discussion/Summary:    PAF: Found in cardiac rehab. Remains symptomatic intermittently. Start flecainide. Check zio. Continue metoprolol and xarelto.    Coronary Atherosclerosis: nonobstructive on left heart cath. Continue with medical management.      Hypertension: His BP Is currently well controlled on current medical therapy.      Hyperlipidemia: Continue atorvastatin.     Severe AS S/P TAVR.       The patient was counseled regarding diagnostic results, instructions for management, risk factor reductions, impressions. total time of encounter was 25 minutes and 15 minutes was spent counseling.

## 2024-09-27 ENCOUNTER — NURSE TRIAGE (OUTPATIENT)
Age: 79
End: 2024-09-27

## 2024-09-27 NOTE — TELEPHONE ENCOUNTER
"Answer Assessment - Initial Assessment Questions  1. REASON FOR CALL or QUESTION: \"What is your reason for calling today?\" or \"How can I best help you?\" or \"What question do you have that I can help answer?\"      Song called has a dental appt coming up and is asking if Xarelto needs to be held    Asked Song if the dentist is requesting, per Song no he has not spoken to them about this.     Advised would speak to the dental office, cause it all depends on the appointment and what is happening at that visit.   From a cardiology standpoint they don't want you stopping unless it is necessary.    Advised to call the denial office and find out if they are requesting Xarelto to be held, if they are let us know and then we can speak to provider.     Pt verbally understood    Protocols used: Information Only Call - No Triage-ADULT-[place]    "   Electronically signed by Kathi Ludwig MD          ED Course and MDM:  Patient presents to the emergency department by squad with complaints of elevated blood pressure and confusion.  Stroke alert was called on this patient.  She went directly to CAT scan after initial evaluation in the room.  The patient had a CT of her head which showed no acute findings.  A CTA also showed no evidence of vascular occlusion or stenosis.  On her CTA there was possibility of pulmonary edema seen in her upper lung fields versus groundglass opacities by the radiologist reading.  The patient has been complaining of some shortness of breath and did require 2 L of oxygen.  She was given 5 mg of Valium because she was fairly anxious during the initial workup regarding stroke.  CC/HPI Summary, DDx, ED Course, and Reassessment: Dr. Kumar the stroke neurologist from [facility] evaluated the patient around 2215 to determine that she was not a candidate for thrombolytics.  He rated her as a \"0\" on stroke scale.  A Cardene drip was initiated because patient's blood pressure was extremely elevated upon arrival.  She was unable to tell us her medication list.  Dr. Kumar recommended keeping her blood pressure between 160 and 180.  Currently 163/56.  Patient's workup did show CHF and she has received 40 of Lasix.  Urinalysis showed no evidence of infection chest x-ray shows no pneumonia.  Chest x-ray is read as possible pulmonary edema    History from : Patient and Family daughter    Limitations to history : None    Patient was given the following medications:  Medications   niCARdipine (CARDENE) 20 mg in 0.86 % sodium chloride 200 mL infusion (7.5 mg/hr IntraVENous Rate/Dose Change 3/3/24 2259)   iopamidol (ISOVUE-370) 76 % injection 75 mL (75 mLs IntraVENous Given 3/3/24 2220)   diazePAM (VALIUM) injection 5 mg (5 mg IntraVENous Given 3/3/24 2243)   magnesium sulfate 2000 mg in 50 mL IVPB premix (2,000 mg IntraVENous New Bag 3/3/24 2331)

## 2024-09-30 ENCOUNTER — TELEPHONE (OUTPATIENT)
Dept: CARDIOLOGY CLINIC | Facility: CLINIC | Age: 79
End: 2024-09-30

## 2024-09-30 ENCOUNTER — CLINICAL SUPPORT (OUTPATIENT)
Dept: CARDIOLOGY CLINIC | Facility: CLINIC | Age: 79
End: 2024-09-30

## 2024-09-30 DIAGNOSIS — I48.0 PAF (PAROXYSMAL ATRIAL FIBRILLATION) (HCC): ICD-10-CM

## 2024-09-30 NOTE — TELEPHONE ENCOUNTER
Irhythmn calling with abnormal results, pt  was in rapid aflutter at 131 BMP lasting 60 seconds on pg 14 strip 12.

## 2024-11-06 NOTE — PROGRESS NOTES
CARDIAC REHABILITATION ASSESSMENT AND INDIVIDUALIZED TREATMENT PLAN  90 DAY          Today's date: 2024   # of Exercise Sessions Completed: 33  Patient name: Song Camargo      : 1945  Age: 79 y.o.       MRN: [id number]  Referring Physician: Jessy Wan PA*  Cardiologist: Nate Page  Provider: Juan Carlos  Clinician: Herminia Karimi MS, CEP      Comments: Darian has done well in his cardiac rehab program. He will be completing his program after 3 more sessions.        Dx:   Encounter Diagnosis   Name Primary?    S/P TAVR (transcatheter aortic valve replacement) Yes       Description of Diagnosis: TAVR    Date of onset: 2024    Other Cardiac History: n/a          ASSESSMENT    Medical History:   Past Medical History:   Diagnosis Date    Arthritis     Basal cell carcinoma (BCC)     Coronary artery disease     High blood pressure     High cholesterol     Lumbar radiculopathy 4/15/2024    Prostate CA (HCC)        Family History:  Family History   Problem Relation Age of Onset    Sudden death Mother         cardiac    Hypertension Father     Coronary artery disease Father     Cancer Father     Hyperlipidemia Father        Allergies:   Patient has no known allergies.    Current Medications:   Current Outpatient Medications   Medication Sig Dispense Refill    acetaminophen (TYLENOL) 325 mg tablet Take 2 tablets (650 mg total) by mouth every 4 (four) hours as needed for fever, mild pain, moderate pain or headaches (temperature greater than 101 F) (Patient taking differently: Take 650 mg by mouth every 4 (four) hours as needed for fever, mild pain, moderate pain or headaches (temperature greater than 101 F) As needed)      aspirin 81 mg chewable tablet Chew 1 tablet (81 mg total) daily      atorvastatin (LIPITOR) 40 mg tablet Take 1 tablet (40 mg total) by mouth daily 90 tablet 3    clopidogrel (PLAVIX) 75 mg tablet Take 1 tablet (75 mg total) by mouth daily (Patient not taking: Reported on  7/26/2024) 90 tablet 1    lisinopril (ZESTRIL) 10 mg tablet Take 1 tablet (10 mg total) by mouth daily 30 tablet 3    pantoprazole (PROTONIX) 40 mg tablet Take 1 tablet (40 mg total) by mouth daily (Patient not taking: Reported on 7/26/2024) 30 tablet 0    polyethylene glycol (MIRALAX) 17 g packet Take 17 g by mouth daily (Patient taking differently: Take 17 g by mouth daily As needed) 510 g 0     No current facility-administered medications for this visit.       Medication compliance: Yes   Comments: Pt reports to be compliant with medications    Physical Limitations: arthritis in toes, limits incline walking. Rare sciatica pain    Fall Risk: Low   Comments: Ambulates with a steady gait with no assist device    Cultural needs: n/a      CAD Risk Factors:  Cholesterol: No  HTN: Yes  DM: No  Obesity: No   Inactivity: No      EXERCISE ASSESSMENT:     Initial Fitness Assessment:   Submaximal TM ETT:  Resting:  BP: 114/76  HR: 69, Exercise:  BP: 132/72  HR: 95, METs:  4.3, and ECG Summary: NSR      Functional Status Prior to Diagnosis for Treatment:   Occupation: retired  Recreation/Physical Activity: gardening, pickleball  ADL’s: resumed all ADLs  [place]: resumed all ADLs  Home exercise equipment:  none  Home exercise: walking approx. 1.5 miles/day  Other: n/a    Current Functional Status:  Occupation: retired  Recreation/Physical Activity: gardening, pickleball  ADL’s:resumed all ADLs  [place]: resumed all ADLs  Home exercise: walking approx. 1.5 miles/day  Other: n/a    Functional Capacity Screening Tool:  [facility] Activity Status Index:  7.01 METs      PSYCHOSOCIAL ASSESSMENT:    Depression screening:  PHQ-9 = 1    Interpretation:  1-4 = Minimal Depression  Anxiety screening:  CALVIN-7 = 2    Interpretation: 0-4  = Not anxious    Pt self-report of depression and anxiety   Patient reports they are coping well with good social support and denies depression or anxiety  Reports sufficient emotional support  "      Self-reported stress level:  2   Stressors:  TAVR surgery, caused minimal stress  Stress Management Tools:  no specific stress management tools    Quality of Life Screen:  (Higher score indicates disease impact on QOL)  [facility] COOP score: 17/40        Social Support:   family  Community/Social Activities: spending time with family     Psychosocial Assessment as it relates to rehabilitation:   Patient denies issues with his/her family or home life that may affect their rehabilitation efforts.       NUTRITION ASSESSMENT:    Weight:  187 lb       Height:   Ht Readings from Last 1 Encounters:   07/26/24 5' 9\" (1.753 m)       Rate Your Plate Score: 65/81    Diabetes: N/A  A1c: no A1c in chart    last measured: n/a    Lipid management:  no lipid profile in chart    Current Dietary Habits:  Reports he chooses low sodium foods, reads food labels when shopping, watches fat content in foods, eats fish and tuna    Drug/Alcohol Use:   N/A      OTHER CORE COMPONENT ASSESSMENT:    Tobacco Use:     N/A: Pt has a remote history of smoking    Anginal Symptoms:  lightheadedness   NTG use: No prescription        INDIVIDUALIZED TREATMENT PLAN      Patient will attend 35 monitored exercise sessions beginning 5/16/2024.    See outlined plan of care below for specific patient goals in each component of care.        EXERCISE GOAL and PLAN      SMART Goals:   10% improvement in functional capacity based on max METs achieved in initial fitness assessment  improved DASI score by 10%  increased exercise capacity by 40% based on peak METs tolerated in cardiac rehab exercise session  maintain > 150 minutes per week of moderate intensity exercise    Patient Specific EXERCISE GOALS:       resume ADLs with increased strength, attend rehab regularly, and resume yard work    Progress toward SMART and personal Exercise goals:  resuming ADLs with increased strength, attending rehab regularly, resuming yard work, walking .5 miles daily 3-4 " x/day with his dog, playing pickleball with his dog    Plan for next 30 days:    education on home exercise guidelines, home exercise 30+ mins 2 days opposite CR, and Group class: Risk Factors for Heart Disease    The patient was counseled on exercise guidelines to achieve a minimum of 150 mins/wk of moderate intensity (RPE 4-6)   exercise and encouraged to add 1-2 days of exercise on opposite days of cardiac rehab as tolerated.     Current Aerobic Exercise Prescription:      Frequency: 3 days/week   Supplement with home exercise 2+ days/wk as tolerated       Minutes: 40-45         METS: 2.9            HR: RHR +30-40bpm   RPE: 4-6         Modalities: Treadmill, UBE, NuStep, and Recumbent bike     Exercise workloads will be progressed gradually as tolerated, within limits of patient's ability, and according to the patient's   response to the exercise program.      Aerobic Exercise Prescription - 30 day goal:   Frequency: 3 days/week of cardiac rehab       Supplement with home exercise 2+ days/wk as tolerated    Minutes: 40 -55      >150 mins/wk of moderate intensity exercise   METS: 2.9-3.5   HR: RHR +30-40 bpm     RPE: 4-6   Modalities: Treadmill, UBE, NuStep, and Recumbent bike    Strength trainin-3 days / week  12-15 repetitions  1-2 sets per modality   Will be added following at least 8 weeks post surgery and 8-10 monitored sessions   Modalities: Pull Downs, Lateral Raise, Arm Extension, Arm Curl, and leg extensions    Home Exercise: Type: walking, Distance 1.5 miles, daily    Group and Individual Education: benefit of exercise for CAD risk factors and RPE scale     Readiness to change: Action:  (Changing behavior)      NUTRITION GOALS AND PLAN    Weight control:    Starting weight: 182.0 lbs   Current weight: 187.0 lbs    Nutritional   Reviewed patient's Rate your Plate. Discussed key elements of heart healthy eating. Reviewed patient goals for dietary modifications and their clinical  implications.  Reviewed most recent lipid profile.     SMART Goals:   eat 6 or more servings of grain products per day, eat 2 or more servings of low fat milk or yogurt a day, eat no more than 6oz of meat per day, eat red meat once a week or less, and rarely/never drink more than 1-2 alcoholic drinks per day.    Patient Specific NUTRITION GOALS:     Continue to read food labels and watch for sodium and fat content.     Patient's progress toward SMART and personal Nutrition goals:   Patient is agreeable to making dietary modifications.    Plan for next 30 days:   group class: Reading Food Labels and group Class: Heart Healthy Eating    Measurable goals were based Rate Your Plate Dietary Self-Assessment. These are the areas in which the patient could score higher on the assessment.  Goals include recommendations for a heart healthy diet based on American Heart Association.    Group and Individual Education:   heart healthy eating principles  low sodium diet    Readiness to change: Action:  (Changing behavior)      PSYCHOSOCIAL ASSESSMENT AND PLAN    Psychosocial Assessment as it relates to rehabilitation:   Patient denies issues with his/her family or home life that may affect their rehabilitation efforts.     SMART Goals:     Physical Fitness in [place] Score < 3, Overall Health in [place] Score < 3, and Change in Health in [place] Score < 3     Patient Specific PSYCHOCOSOCIAL GOALS:     spend time with family    Patient's progress toward SMART and personal Psychosocial goals:   No reported changes in psychosocial status    Plan for next 30 days:   Class: Stress and Your Health and Class: Relaxation    Group and Individual Education: benefits of a positive support system and stress management techniques    Information to utilize [employer] was provided as well as contact information for counseling through  Behavioral Health and group psychotherapy groups available.    Readiness to change: Action:  (Changing  behavior)      OTHER CORE COMPONENTS GOALS and PLAN      Blood Pressure will be monitored throughout the program and cardiologist will be notified of elevated trends.    Pt will be encouraged to monitor home BP if advised by cardiologist.    Tobacco Intervention:   N/A:  Pt has a remote history of smoking.      SMART Goals:   consistent, controlled resting BP < 130/80 and medication compliance    Patient Specific CORE COMPONENT GOALS:    reduced dietary sodium <2000mg and medication compliance    Progress toward SMART and personal Core Component goals:    Pt was experiencing episodes of symptomatic low BP. He was taken off of amlodipine and was encouraged to hydrate more. He reports he has been feeling better and has been getting higher BP readings since making those changes.     Plan for next 30 days:   group class: Understanding Heart Disease, group class: Common Heart Medications, medication compliance, and monitor home BP    Group and Individual Education:  understanding high blood pressure and it's relationship to CAD, components of blood pressure management, and group class:  Common Heart Medications    Readiness to change: Action:  (Changing behavior)   atorvastatin 80 mg oral tablet: 1 tab(s) orally once a day (at bedtime)  ciprofloxacin 500 mg oral tablet: 1 tab(s) orally every 12 hours through 2024  clopidogrel 75 mg oral tablet: 1 tab(s) orally once a day  Ecotrin Adult Low Strength 81 mg oral delayed release tablet: 1 tab(s) orally once a day  latanoprost 0.005% ophthalmic solution: 1 drop(s) in each eye once a day (at bedtime)  lisinopril 10 mg oral tablet: 1 tab(s) orally once a day  metoprolol succinate 25 mg oral tablet, extended release: 1 tab(s) orally once a day  Mounjaro 10 mg/0.5 mL subcutaneous solution: 10 milligram(s) subcutaneously  ondansetron 4 mg oral tablet, disintegratin tab(s) orally prn up to 3 times a day for nausea  Tadalafil (Eqv-Cialis) 20 mg oral tablet: 1 tab(s) orally once a day as needed for take as instructed

## 2024-11-08 ENCOUNTER — NURSE TRIAGE (OUTPATIENT)
Age: 79
End: 2024-11-08

## 2024-11-08 ENCOUNTER — OFFICE VISIT (OUTPATIENT)
Dept: URGENT CARE | Facility: CLINIC | Age: 79
End: 2024-11-08
Payer: COMMERCIAL

## 2024-11-08 ENCOUNTER — APPOINTMENT (EMERGENCY)
Dept: NON INVASIVE DIAGNOSTICS | Facility: HOSPITAL | Age: 79
End: 2024-11-08
Payer: COMMERCIAL

## 2024-11-08 ENCOUNTER — HOSPITAL ENCOUNTER (EMERGENCY)
Facility: HOSPITAL | Age: 79
Discharge: HOME | End: 2024-11-08
Attending: EMERGENCY MEDICINE
Payer: COMMERCIAL

## 2024-11-08 VITALS
RESPIRATION RATE: 18 BRPM | SYSTOLIC BLOOD PRESSURE: 166 MMHG | DIASTOLIC BLOOD PRESSURE: 77 MMHG | OXYGEN SATURATION: 97 % | TEMPERATURE: 98.2 F | HEART RATE: 51 BPM

## 2024-11-08 VITALS
OXYGEN SATURATION: 98 % | HEIGHT: 69 IN | TEMPERATURE: 97 F | HEART RATE: 54 BPM | BODY MASS INDEX: 27.25 KG/M2 | DIASTOLIC BLOOD PRESSURE: 80 MMHG | WEIGHT: 184 LBS | SYSTOLIC BLOOD PRESSURE: 132 MMHG | RESPIRATION RATE: 16 BRPM

## 2024-11-08 DIAGNOSIS — R22.41 LOCALIZED SWELLING OF RIGHT LOWER LEG: Primary | ICD-10-CM

## 2024-11-08 DIAGNOSIS — S80.11XA: Primary | ICD-10-CM

## 2024-11-08 LAB
ALBUMIN SERPL BCG-MCNC: 4.2 G/DL (ref 3.5–5)
ALP SERPL-CCNC: 47 U/L (ref 34–104)
ALT SERPL W P-5'-P-CCNC: 15 U/L (ref 7–52)
ANION GAP SERPL CALCULATED.3IONS-SCNC: 4 MMOL/L (ref 4–13)
APTT PPP: 33 SECONDS (ref 23–34)
AST SERPL W P-5'-P-CCNC: 15 U/L (ref 13–39)
BASOPHILS # BLD AUTO: 0.04 THOUSANDS/ÂΜL (ref 0–0.1)
BASOPHILS NFR BLD AUTO: 1 % (ref 0–1)
BILIRUB SERPL-MCNC: 0.82 MG/DL (ref 0.2–1)
BUN SERPL-MCNC: 9 MG/DL (ref 5–25)
CALCIUM SERPL-MCNC: 8.9 MG/DL (ref 8.4–10.2)
CHLORIDE SERPL-SCNC: 106 MMOL/L (ref 96–108)
CO2 SERPL-SCNC: 32 MMOL/L (ref 21–32)
CREAT SERPL-MCNC: 0.92 MG/DL (ref 0.6–1.3)
EOSINOPHIL # BLD AUTO: 0.15 THOUSAND/ÂΜL (ref 0–0.61)
EOSINOPHIL NFR BLD AUTO: 3 % (ref 0–6)
ERYTHROCYTE [DISTWIDTH] IN BLOOD BY AUTOMATED COUNT: 12.5 % (ref 11.6–15.1)
GFR SERPL CREATININE-BSD FRML MDRD: 79 ML/MIN/1.73SQ M
GLUCOSE SERPL-MCNC: 96 MG/DL (ref 65–140)
HCT VFR BLD AUTO: 46.8 % (ref 36.5–49.3)
HGB BLD-MCNC: 15.6 G/DL (ref 12–17)
IMM GRANULOCYTES # BLD AUTO: 0.02 THOUSAND/UL (ref 0–0.2)
IMM GRANULOCYTES NFR BLD AUTO: 0 % (ref 0–2)
INR PPP: 2.33 (ref 0.85–1.19)
LYMPHOCYTES # BLD AUTO: 2.15 THOUSANDS/ÂΜL (ref 0.6–4.47)
LYMPHOCYTES NFR BLD AUTO: 36 % (ref 14–44)
MCH RBC QN AUTO: 32.1 PG (ref 26.8–34.3)
MCHC RBC AUTO-ENTMCNC: 33.3 G/DL (ref 31.4–37.4)
MCV RBC AUTO: 96 FL (ref 82–98)
MONOCYTES # BLD AUTO: 0.57 THOUSAND/ÂΜL (ref 0.17–1.22)
MONOCYTES NFR BLD AUTO: 10 % (ref 4–12)
NEUTROPHILS # BLD AUTO: 3.09 THOUSANDS/ÂΜL (ref 1.85–7.62)
NEUTS SEG NFR BLD AUTO: 50 % (ref 43–75)
NRBC BLD AUTO-RTO: 0 /100 WBCS
PLATELET # BLD AUTO: 206 THOUSANDS/UL (ref 149–390)
PMV BLD AUTO: 10.1 FL (ref 8.9–12.7)
POTASSIUM SERPL-SCNC: 4.2 MMOL/L (ref 3.5–5.3)
PROT SERPL-MCNC: 6.7 G/DL (ref 6.4–8.4)
PROTHROMBIN TIME: 25.9 SECONDS (ref 12.3–15)
RBC # BLD AUTO: 4.86 MILLION/UL (ref 3.88–5.62)
SODIUM SERPL-SCNC: 142 MMOL/L (ref 135–147)
WBC # BLD AUTO: 6.02 THOUSAND/UL (ref 4.31–10.16)

## 2024-11-08 PROCEDURE — 93971 EXTREMITY STUDY: CPT | Performed by: SURGERY

## 2024-11-08 PROCEDURE — 99284 EMERGENCY DEPT VISIT MOD MDM: CPT | Performed by: PHYSICIAN ASSISTANT

## 2024-11-08 PROCEDURE — 85730 THROMBOPLASTIN TIME PARTIAL: CPT | Performed by: PHYSICIAN ASSISTANT

## 2024-11-08 PROCEDURE — 99213 OFFICE O/P EST LOW 20 MIN: CPT | Performed by: PHYSICIAN ASSISTANT

## 2024-11-08 PROCEDURE — 85610 PROTHROMBIN TIME: CPT | Performed by: PHYSICIAN ASSISTANT

## 2024-11-08 PROCEDURE — 80053 COMPREHEN METABOLIC PANEL: CPT | Performed by: PHYSICIAN ASSISTANT

## 2024-11-08 PROCEDURE — 99284 EMERGENCY DEPT VISIT MOD MDM: CPT

## 2024-11-08 PROCEDURE — 85025 COMPLETE CBC W/AUTO DIFF WBC: CPT | Performed by: PHYSICIAN ASSISTANT

## 2024-11-08 PROCEDURE — 36415 COLL VENOUS BLD VENIPUNCTURE: CPT | Performed by: PHYSICIAN ASSISTANT

## 2024-11-08 PROCEDURE — 93971 EXTREMITY STUDY: CPT

## 2024-11-08 NOTE — PROGRESS NOTES
[facility]        NAME: Song Camargo is a 79 y.o. male  : 1945    MRN: [id number]  DATE: 2024  TIME: 11:35 AM    Assessment and Plan   Localized swelling of right lower leg [R22.41]  1. Localized swelling of right lower leg  Transfer to other facility            Patient Instructions       Follow up with PCP in 3-5 days.  Proceed to  ER if symptoms worsen.    If tests have been performed at [facility], our office will contact you with results if changes need to be made to the care plan discussed with you at the visit.  [facility]    Chief Complaint     Chief Complaint   Patient presents with    Leg Pain     Pt presents with right posterior calf bruising with skin lump. States tenderness to the touch. States first noticed area last night. Denies any known injury. Denies any other symptoms.          History of Present Illness       Patient is a 79-year-old male who presents today complaining of right calf pain and ecchymosis.  Patient reports he noticed the right calf pain last night.  Denies any injury or trauma.  Denies any pain with ambulation. Patient had a TAVR in 2024.  Patient is currently taking Xarelto and baby aspirin daily.    Patient reports he did call his cardiologist this morning and they wanted him evaluated for a DVT of right lower leg.    Leg Pain         Review of Systems   Review of Systems   Constitutional: Negative.    Respiratory: Negative.     Cardiovascular: Negative.    Musculoskeletal:         Right calf pain   Psychiatric/Behavioral: Negative.           Current Medications       Current Outpatient Medications:     acetaminophen (TYLENOL) 325 mg tablet, Take 2 tablets (650 mg total) by mouth every 4 (four) hours as needed for fever, mild pain, moderate pain or headaches (temperature greater than 101 F), Disp: , Rfl:     aspirin 81 mg chewable tablet, Chew 1 tablet (81 mg total) daily, Disp: , Rfl:      atorvastatin (LIPITOR) 40 mg tablet, Take 1 tablet (40 mg total) by mouth daily, Disp: 90 tablet, Rfl: 3    flecainide (TAMBOCOR) 50 mg tablet, Take 1 tablet (50 mg total) by mouth 2 (two) times a day, Disp: 180 tablet, Rfl: 3    lisinopril (ZESTRIL) 5 mg tablet, Take 1 tablet (5 mg total) by mouth daily, Disp: 30 tablet, Rfl: 3    metoprolol succinate (TOPROL-XL) 25 mg 24 hr tablet, Take 1 tablet (25 mg total) by mouth daily, Disp: 90 tablet, Rfl: 3    polyethylene glycol (MIRALAX) 17 g packet, Take 17 g by mouth daily (Patient taking differently: Take 17 g by mouth daily As needed), Disp: 510 g, Rfl: 0    rivaroxaban (Xarelto) 20 mg tablet, Take 1 tablet (20 mg total) by mouth daily with breakfast, Disp: 90 tablet, Rfl: 3    lisinopril (ZESTRIL) 20 mg tablet, , Disp: , Rfl:     Current Allergies     Allergies as of 11/08/2024    (No Known Allergies)            The following portions of the patient's history were reviewed and updated as appropriate: allergies, current medications, past family history, past medical history, past social history, past surgical history and problem list.     Past Medical History:   Diagnosis Date    Arthritis     Basal cell carcinoma (BCC)     Coronary artery disease     High blood pressure     High cholesterol     Lumbar radiculopathy 4/15/2024    Prostate CA (HCC)        Past Surgical History:   Procedure Laterality Date    APPENDECTOMY      CARDIAC CATHETERIZATION N/A 3/13/2024    Procedure: Cardiac RHC/LHC;  Surgeon: Mir Sharpe MD;  Location: [facility];  Service: Cardiology    CARDIAC CATHETERIZATION N/A 4/16/2024    Procedure: Cardiac tavr;  Surgeon: Mir Sharpe MD;  Location: [facility];  Service: Cardiology    [facility]    DE REPLACE AORTIC VALVE OPENFEMORAL ARTERY APPROACH N/A 4/16/2024    Procedure: REPLACEMENT AORTIC VALVE TRANSCATHETER (TAVR) TRANSFEMORAL W/ 23MM OLIVEIRA REGINE S3 UR VALVE(ACCESS ON LEFT) TEQUILA;  Surgeon: Jacob  "DO Colleen;  Location: [facility];  Service: Cardiac Surgery    PROSTATECTOMY  2010    SHOULDER SURGERY Right     TONSILLECTOMY      VASECTOMY         Family History   Problem Relation Age of Onset    Sudden death Mother         cardiac    Hypertension Father     Coronary artery disease Father     Cancer Father     Hyperlipidemia Father          Medications have been verified.        Objective   /80 (BP Location: Left arm, Patient Position: Sitting)   Pulse (!) 54   Temp (!) 97 °F (36.1 °C)   Resp 16   Ht 5' 9\" (1.753 m)   Wt 83.5 kg (184 lb)   SpO2 98%   BMI 27.17 kg/m²   No LMP for male patient.       Physical Exam     Physical Exam  Vitals and nursing note reviewed.   Constitutional:       Appearance: Normal appearance.   HENT:      Head: Normocephalic.   Cardiovascular:      Rate and Rhythm: Normal rate and regular rhythm.      Heart sounds: Normal heart sounds.   Pulmonary:      Breath sounds: Normal breath sounds. No wheezing.   Musculoskeletal:      Comments: Small quarter size \"mass\" felt with palpation of mid right calf.    Skin:     Comments: Right calf swelling with ecchymosis. No redness or warmth on posterior right calf   Neurological:      General: No focal deficit present.      Mental Status: He is alert and oriented to person, place, and time.   Psychiatric:         Mood and Affect: Mood normal.         Behavior: Behavior normal.                   "

## 2024-11-08 NOTE — DISCHARGE INSTRUCTIONS
Rest, ice, elevate leg.  Tylenol for discomfort.  Follow up with PCP in 1 week for recheck as needed.

## 2024-11-08 NOTE — ED PROVIDER NOTES
"Time reflects when diagnosis was documented in both MDM as applicable and the Disposition within this note       Time User Action Codes Description Comment    11/8/2024 11:20 AM Ana Luisa Bustamante Add [S80.11XA] Traumatic ecchymosis of right lower leg           ED Disposition       ED Disposition   Discharge    Condition   Stable    Date/Time   Fri Nov 8, 2024 11:20 AM    Comment   Song Camargo discharge to home/self care.                   Assessment & Plan       Medical Decision Making  Patient with right calf pain, bruising, will order doppler to r/o DVT. Will order labs to r/o anemia, thrombocytopenia.  No acute abnormalities on u/s or labs, most likely traumatic bruise.  Advised f/u with PCP as needed.      Amount and/or Complexity of Data Reviewed  Labs: ordered.        ED Course as of 11/08/24 1130   Fri Nov 08, 2024   1056 Doppler negative per Sigrid from vascular.        Medications - No data to display    ED Risk Strat Scores                                               History of Present Illness       Chief Complaint   Patient presents with    Bleeding/Bruising     Pt states \"Мария noticed this bump on my right leg and today it looks yellow around it. Just concerned because I'm on blood thinners\" Denies any trauma or injury. Denies any change of pain with movement.        Past Medical History:   Diagnosis Date    Arthritis     Basal cell carcinoma (BCC)     Coronary artery disease     High blood pressure     High cholesterol     Lumbar radiculopathy 4/15/2024    Prostate CA (HCC)       Past Surgical History:   Procedure Laterality Date    APPENDECTOMY      CARDIAC CATHETERIZATION N/A 3/13/2024    Procedure: Cardiac RHC/LHC;  Surgeon: Mir Sharpe MD;  [facility];  Service: Cardiology    CARDIAC CATHETERIZATION N/A 4/16/2024    Procedure: Cardiac tavr;  Surgeon: Mir Sharpe MD;  [facility];  Service: Cardiology    [place]    [facility] [place]    [place] REPLACE " AORTIC VALVE OPENFEMORAL ARTERY APPROACH N/A 2024    Procedure: REPLACEMENT AORTIC VALVE TRANSCATHETER (TAVR) TRANSFEMORAL W/ 23MM OLIVEIRA REGINE S3 UR VALVE(ACCESS ON LEFT) TEQUILA;  Surgeon: Jacob Macias DO;  Location[facility];  Service: Cardiac Surgery    PROSTATECTOMY  2010    SHOULDER SURGERY Right     TONSILLECTOMY      VASECTOMY        Family History   Problem Relation Age of Onset    Sudden death Mother         cardiac    Hypertension Father     Coronary artery disease Father     Cancer Father     Hyperlipidemia Father       Social History     Tobacco Use    Smoking status: Former     Current packs/day: 0.00     Types: Cigarettes     Quit date: 2004     Years since quittin.6    Smokeless tobacco: Never   Vaping Use    Vaping status: Never Used   Substance Use Topics    Alcohol use: Yes     Alcohol/week: 8.0 standard drinks of alcohol     Types: 8 Glasses of wine per week     Comment: drinks one glass of wine on the weekdays and maybe two on the weekend    Drug use: Never      E-Cigarette/Vaping    E-Cigarette Use Never User       E-Cigarette/Vaping Substances    Nicotine No     THC No     CBD No     Flavoring No       I have reviewed and agree with the history as documented.     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  He denies recent trauma.  NO chest pain or SOB.  No blood in stool or urine.  He has been compliant with his xarelto.      Review of Systems   Constitutional:  Negative for chills and fever.   Respiratory:  Negative for cough and shortness of breath.    Cardiovascular:  Negative for chest pain and leg swelling.   Gastrointestinal: Negative.    Musculoskeletal:         Right calf pain   Skin:         Bruising right calf.    Neurological:  Negative for dizziness, weakness and numbness.   Psychiatric/Behavioral:  Negative for confusion.    All other systems reviewed and are  negative.          Objective       ED Triage Vitals [11/08/24 1021]   Temperature Pulse Blood Pressure Respirations SpO2 Patient Position - Orthostatic VS   98.2 °F (36.8 °C) (!) 53 166/88 20 97 % Sitting      Temp Source Heart Rate Source BP Location FiO2 (%) Pain Score    Temporal Monitor Right arm -- No Pain      Vitals      Date and Time Temp Pulse SpO2 Resp BP Pain Score FACES Pain Rating User   11/08/24 1100 -- 51 97 % 18 166/77 -- -- RN   11/08/24 1021 98.2 °F (36.8 °C) 53 97 % 20 166/88 No Pain --             Physical Exam  Vitals and nursing note reviewed.   Constitutional:       General: He is not in acute distress.     Appearance: Normal appearance. He is well-developed and well-groomed. He is not ill-appearing or diaphoretic.   HENT:      Head: Normocephalic and atraumatic.      Right Ear: Hearing normal.      Left Ear: Hearing normal.      Nose: Nose normal.   Eyes:      Conjunctiva/sclera: Conjunctivae normal.   Cardiovascular:      Rate and Rhythm: Regular rhythm. Bradycardia present.      Pulses:           Dorsalis pedis pulses are 2+ on the right side and 2+ on the left side.   Pulmonary:      Effort: Pulmonary effort is normal.      Breath sounds: Normal breath sounds.   Musculoskeletal:      Cervical back: Normal range of motion.      Right knee: Normal.      Right lower leg: Tenderness (right calf with yellowish bruising) present.      Right ankle: Normal.      Right foot: Normal.   Skin:     General: Skin is warm and dry.      Findings: Bruising (right calf) present.   Neurological:      Mental Status: He is alert and oriented to person, place, and time.      Sensory: Sensation is intact.      Motor: Motor function is intact.   Psychiatric:         Behavior: Behavior is cooperative.         Results Reviewed       Procedure Component Value Units Date/Time    Protime-INR [[id number]]  (Abnormal) Collected: 11/08/24 1041    Lab Status: Final result Specimen: Blood from Arm, Right Updated: 11/08/24  1130     Protime 25.9 seconds      INR 2.33    Narrative:      INR Therapeutic Range    Indication                                             INR Range      Atrial Fibrillation                                               2.0-3.0  Hypercoagulable State                                    2.0.2.3  Left Ventricular Asist Device                            2.0-3.0  Mechanical Heart Valve                                  -    Aortic(with afib, MI, embolism, HF, LA enlargement,    and/or coagulopathy)                                     2.0-3.0 (2.5-3.5)     Mitral                                                             2.5-3.5  Prosthetic/Bioprosthetic Heart Valve               2.0-3.0  Venous thromboembolism (VTE: VT, PE        2.0-3.0    APTT [[id number]]  (Normal) Collected: 11/08/24 1041    Lab Status: Final result Specimen: Blood from Arm, Right Updated: 11/08/24 1130     PTT 33 seconds     Comprehensive metabolic panel [[id number]] Collected: 11/08/24 1041    Lab Status: Final result Specimen: Blood from Arm, Right Updated: 11/08/24 1110     Sodium 142 mmol/L      Potassium 4.2 mmol/L      Chloride 106 mmol/L      CO2 32 mmol/L      ANION GAP 4 mmol/L      BUN 9 mg/dL      Creatinine 0.92 mg/dL      Glucose 96 mg/dL      Calcium 8.9 mg/dL      AST 15 U/L      ALT 15 U/L      Alkaline Phosphatase 47 U/L      Total Protein 6.7 g/dL      Albumin 4.2 g/dL      Total Bilirubin 0.82 mg/dL      eGFR 79 ml/min/1.73sq m     Narrative:      National Kidney Disease Foundation guidelines for Chronic Kidney Disease (CKD):     Stage 1 with normal or high GFR (GFR > 90 mL/min/1.73 square meters)    Stage 2 Mild CKD (GFR = 60-89 mL/min/1.73 square meters)    Stage 3A Moderate CKD (GFR = 45-59 mL/min/1.73 square meters)    Stage 3B Moderate CKD (GFR = 30-44 mL/min/1.73 square meters)    Stage 4 Severe CKD (GFR = 15-29 mL/min/1.73 square meters)    Stage 5 End Stage CKD (GFR <15 mL/min/1.73 square meters)  Note: GFR calculation  is accurate only with a steady state creatinine    CBC and differential [[id number]] Collected: 11/08/24 1041    Lab Status: Final result Specimen: Blood from Arm, Right Updated: 11/08/24 1053     WBC 6.02 Thousand/uL      RBC 4.86 Million/uL      Hemoglobin 15.6 g/dL      Hematocrit 46.8 %      MCV 96 fL      MCH 32.1 pg      MCHC 33.3 g/dL      RDW 12.5 %      MPV 10.1 fL      Platelets 206 Thousands/uL      nRBC 0 /100 WBCs      Segmented % 50 %      Immature Grans % 0 %      Lymphocytes % 36 %      Monocytes % 10 %      Eosinophils Relative 3 %      Basophils Relative 1 %      Absolute Neutrophils 3.09 Thousands/µL      Absolute Immature Grans 0.02 Thousand/uL      Absolute Lymphocytes 2.15 Thousands/µL      Absolute Monocytes 0.57 Thousand/µL      Eosinophils Absolute 0.15 Thousand/µL      Basophils Absolute 0.04 Thousands/µL             VAS lower limb venous duplex study, unilateral/limited    (Results Pending)       Procedures    ED Medication and Procedure Management   Prior to Admission Medications   Prescriptions Last Dose Informant Patient Reported? Taking?   acetaminophen (TYLENOL) 325 mg tablet  Self No No   Sig: Take 2 tablets (650 mg total) by mouth every 4 (four) hours as needed for fever, mild pain, moderate pain or headaches (temperature greater than 101 F)   aspirin 81 mg chewable tablet  Self No No   Sig: Chew 1 tablet (81 mg total) daily   atorvastatin (LIPITOR) 40 mg tablet  Self No No   Sig: Take 1 tablet (40 mg total) by mouth daily   flecainide (TAMBOCOR) 50 mg tablet   No No   Sig: Take 1 tablet (50 mg total) by mouth 2 (two) times a day   lisinopril (ZESTRIL) 20 mg tablet   Yes No   lisinopril (ZESTRIL) 5 mg tablet   No No   Sig: Take 1 tablet (5 mg total) by mouth daily   metoprolol succinate (TOPROL-XL) 25 mg 24 hr tablet   No No   Sig: Take 1 tablet (25 mg total) by mouth daily   polyethylene glycol (MIRALAX) 17 g packet  Self No No   Sig: Take 17 g by mouth daily   Patient taking  differently: Take 17 g by mouth daily As needed   rivaroxaban (Xarelto) 20 mg tablet   No No   Sig: Take 1 tablet (20 mg total) by mouth daily with breakfast      Facility-Administered Medications: None     Discharge Medication List as of 11/8/2024 11:20 AM        CONTINUE these medications which have NOT CHANGED    Details   acetaminophen (TYLENOL) 325 mg tablet Take 2 tablets (650 mg total) by mouth every 4 (four) hours as needed for fever, mild pain, moderate pain or headaches (temperature greater than 101 F), Starting Wed 4/17/2024, OTC      aspirin 81 mg chewable tablet Chew 1 tablet (81 mg total) daily, Starting Wed 3/13/2024, No Print      atorvastatin (LIPITOR) 40 mg tablet Take 1 tablet (40 mg total) by mouth daily, Starting Wed 3/13/2024, Normal      flecainide (TAMBOCOR) 50 mg tablet Take 1 tablet (50 mg total) by mouth 2 (two) times a day, Starting Wed 9/11/2024, Normal      !! lisinopril (ZESTRIL) 20 mg tablet Historical Med      !! lisinopril (ZESTRIL) 5 mg tablet Take 1 tablet (5 mg total) by mouth daily, Starting Thu 8/15/2024, Normal      metoprolol succinate (TOPROL-XL) 25 mg 24 hr tablet Take 1 tablet (25 mg total) by mouth daily, Starting Thu 8/8/2024, Normal      polyethylene glycol (MIRALAX) 17 g packet Take 17 g by mouth daily, Starting Thu 4/18/2024, Until Fri 11/8/2024, Normal      rivaroxaban (Xarelto) 20 mg tablet Take 1 tablet (20 mg total) by mouth daily with breakfast, Starting Fri 8/9/2024, Normal       !! - Potential duplicate medications found. Please discuss with provider.        No discharge procedures on file.  ED SEPSIS DOCUMENTATION   Time reflects when diagnosis was documented in both MDM as applicable and the Disposition within this note       Time User Action Codes Description Comment    11/8/2024 11:20 AM Ana Luisa Bustamante [S80.11XA] Traumatic ecchymosis of right lower leg                  Ana Luisa Bustamante PA-C  11/08/24 1132

## 2024-11-08 NOTE — TELEPHONE ENCOUNTER
"Reason for Disposition   Thigh, calf, or ankle swelling in only one leg    Answer Assessment - Initial Assessment Questions  1. ONSET: \"When did the swelling start?\" (e.g., minutes, hours, days)      Noticed last night a lump/bump of fluid on the right calf, slightly below  Is noticing a yellowish color in that area, also  bruise like    2. LOCATION: \"What part of the leg is swollen?\"  \"Are both legs swollen or just one leg?\"      Right calf, slightly below calf  3. SEVERITY: \"How bad is the swelling?\" (e.g., localized; mild, moderate, severe)      Lump/bump localized  4. REDNESS: \"Does the swelling look red or infected?\"      No redness, but itching slightly, unable to look and see completely but does think   5. PAIN: \"Is the swelling painful to touch?\" If Yes, ask: \"How painful is it?\"   (Scale 1-10; mild, moderate or severe)      no  6. FEVER: \"Do you have a fever?\" If Yes, ask: \"What is it, how was it measured, and when did it start?\"       no  7. CAUSE: \"What do you think is causing the leg swelling?\"      unsure  8. MEDICAL HISTORY: \"Do you have a history of blood clots (e.g., DVT), cancer, heart failure, kidney disease, or liver failure?\"      TAVR back in April, aortic valve stenosis  9. RECURRENT SYMPTOM: \"Have you had leg swelling before?\" If Yes, ask: \"When was the last time?\" \"What happened that time?\"        10. OTHER SYMPTOMS: \"Do you have any other symptoms?\" (e.g., chest pain, difficulty breathing)        Denies    Advised Song would recommended going to an urgent care for evaluation. Pt verbally understood    Protocols used: Leg Swelling and Edema-Adult-[place]    "

## 2024-11-11 ENCOUNTER — TELEPHONE (OUTPATIENT)
Age: 79
End: 2024-11-11

## 2024-11-11 ENCOUNTER — TELEPHONE (OUTPATIENT)
Dept: OBGYN CLINIC | Facility: CLINIC | Age: 79
End: 2024-11-11

## 2024-11-11 ENCOUNTER — OFFICE VISIT (OUTPATIENT)
Dept: OBGYN CLINIC | Facility: CLINIC | Age: 79
End: 2024-11-11
Payer: COMMERCIAL

## 2024-11-11 VITALS
BODY MASS INDEX: 27.25 KG/M2 | HEIGHT: 69 IN | DIASTOLIC BLOOD PRESSURE: 62 MMHG | WEIGHT: 184 LBS | SYSTOLIC BLOOD PRESSURE: 110 MMHG

## 2024-11-11 DIAGNOSIS — M18.11 ARTHRITIS OF CARPOMETACARPAL (CMC) JOINT OF RIGHT THUMB: Primary | ICD-10-CM

## 2024-11-11 PROCEDURE — 99213 OFFICE O/P EST LOW 20 MIN: CPT | Performed by: ORTHOPAEDIC SURGERY

## 2024-11-11 PROCEDURE — 20600 DRAIN/INJ JOINT/BURSA W/O US: CPT | Performed by: ORTHOPAEDIC SURGERY

## 2024-11-11 RX ORDER — BETAMETHASONE SODIUM PHOSPHATE AND BETAMETHASONE ACETATE 3; 3 MG/ML; MG/ML
6 INJECTION, SUSPENSION INTRA-ARTICULAR; INTRALESIONAL; INTRAMUSCULAR; SOFT TISSUE
Status: COMPLETED | OUTPATIENT
Start: 2024-11-11 | End: 2024-11-11

## 2024-11-11 RX ORDER — LIDOCAINE HYDROCHLORIDE 10 MG/ML
1 INJECTION, SOLUTION INFILTRATION; PERINEURAL
Status: COMPLETED | OUTPATIENT
Start: 2024-11-11 | End: 2024-11-11

## 2024-11-11 RX ORDER — ATORVASTATIN CALCIUM 20 MG/1
20 TABLET, FILM COATED ORAL
COMMUNITY
Start: 2024-09-14

## 2024-11-11 RX ADMIN — BETAMETHASONE SODIUM PHOSPHATE AND BETAMETHASONE ACETATE 6 MG: 3; 3 INJECTION, SUSPENSION INTRA-ARTICULAR; INTRALESIONAL; INTRAMUSCULAR; SOFT TISSUE at 13:00

## 2024-11-11 RX ADMIN — LIDOCAINE HYDROCHLORIDE 1 ML: 10 INJECTION, SOLUTION INFILTRATION; PERINEURAL at 13:00

## 2024-11-11 NOTE — TELEPHONE ENCOUNTER
Caller: patient     Doctor: Liliana     Reason for call: can patient have a cortisone injection while taking Xarelto? Scheduled for today 12:45.  Chelsea will contact patient.     Call back#: [phone]

## 2024-11-11 NOTE — PROGRESS NOTES
Assessment/Plan:  1. Arthritis of carpometacarpal (CMC) joint of right thumb  Small joint arthrocentesis: R thumb CMC          The patient was interested in getting a CSI for R thumb CMC joint arthritis  Risks, benefits, and realistic expectations for treatment options were discussed.    The patient was given an opportunity to ask questions.  Questions were answered to the patient's satisfaction.    Through shared decision making, the patient decided to move forward with cortisone injection for the right thumb CMC joint. Patient tolerated the procedure well. Can use cold compress as needed for the next 24 hours.  Take tylenol as needed.  Follow up in 3 months. If asymptomatic, may cancel appointment.      Small joint arthrocentesis: R thumb CMC  [place] Protocol:  Consent: Verbal consent obtained.  Risks and benefits: risks, benefits and alternatives were discussed  Consent given by: patient  Patient understanding: patient states understanding of the procedure being performed  Site marked: the operative site was marked  Patient identity confirmed: verbally with patient  Supporting Documentation  Indications: pain   Procedure Details  Location: thumb - R thumb CMC  Needle size: 25 G  Ultrasound guidance: no  Approach: lateral  Medications administered: 1 mL lidocaine 1 %; 6 mg betamethasone acetate-betamethasone sodium phosphate 6 (3-3) mg/mL    Patient tolerance: patient tolerated the procedure well with no immediate complications  Dressing:  Sterile dressing applied           cc: right thumb pain    Subjective:   Song Camargo is a right hand dominant 79 y.o. male who presents for follow up for right thumb trigger finger and thumb CMC arthritis. He reports having pain over the CMC joint of the right thumb. He is not having any triggering of the right thumb at this time.       Review of Systems   Constitutional:  Negative for chills and fever.   HENT:  Negative for ear pain and sore throat.    Eyes:  Negative  for pain and visual disturbance.   Respiratory:  Negative for cough and shortness of breath.    Cardiovascular:  Negative for chest pain and palpitations.   Gastrointestinal:  Negative for abdominal pain and vomiting.   Genitourinary:  Negative for dysuria and hematuria.   Musculoskeletal:  Negative for arthralgias and back pain.   Skin:  Negative for color change and rash.   Neurological:  Negative for seizures and syncope.   All other systems reviewed and are negative.        Past Medical History:   Diagnosis Date    Arthritis     Basal cell carcinoma (BCC)     Coronary artery disease     High blood pressure     High cholesterol     Lumbar radiculopathy 4/15/2024    Prostate CA (HCC)        Past Surgical History:   Procedure Laterality Date    APPENDECTOMY      CARDIAC CATHETERIZATION N/A 3/13/2024    Procedure: Cardiac RHC/LHC;  Surgeon: Mir Sharpe MD;  [facility];  Service: Cardiology    CARDIAC CATHETERIZATION N/A 2024    Procedure: Cardiac tavr;  Surgeon: Mir Sharpe MD;  [facility];  Service: Cardiology    [place]OSCOPY  2014    [facility] [place]      [place] REPLACE AORTIC VALVE OPENFEMORAL ARTERY APPROACH N/A 2024    Procedure: REPLACEMENT AORTIC VALVE TRANSCATHETER (TAVR) TRANSFEMORAL W/ 23MM OLIVEIRA REGINE S3 UR VALVE(ACCESS ON LEFT) TEQUILA;  Surgeon: Jacob Macias DO;  [facility];  Service: Cardiac Surgery    PROSTATECTOMY  2010    SHOULDER SURGERY Right     TONSILLECTOMY      VASECTOMY         Family History   Problem Relation Age of Onset    Sudden death Mother         cardiac    Hypertension Father     Coronary artery disease Father     Cancer Father     Hyperlipidemia Father        Social History     Occupational History    Not on file   Tobacco Use    Smoking status: Former     Current packs/day: 0.00     Types: Cigarettes     Quit date: 2004     Years since quittin.6    Smokeless tobacco: Never   Vaping Use    Vaping status: Never Used    Substance and Sexual Activity    Alcohol use: Yes     Alcohol/week: 8.0 standard drinks of alcohol     Types: 8 Glasses of wine per week     Comment: drinks one glass of wine on the weekdays and maybe two on the weekend    Drug use: Never    Sexual activity: Not on file         Current Outpatient Medications:     atorvastatin (LIPITOR) 20 mg tablet, 20 mg, Disp: , Rfl:     acetaminophen (TYLENOL) 325 mg tablet, Take 2 tablets (650 mg total) by mouth every 4 (four) hours as needed for fever, mild pain, moderate pain or headaches (temperature greater than 101 F), Disp: , Rfl:     aspirin 81 mg chewable tablet, Chew 1 tablet (81 mg total) daily, Disp: , Rfl:     atorvastatin (LIPITOR) 40 mg tablet, Take 1 tablet (40 mg total) by mouth daily, Disp: 90 tablet, Rfl: 3    flecainide (TAMBOCOR) 50 mg tablet, Take 1 tablet (50 mg total) by mouth 2 (two) times a day, Disp: 180 tablet, Rfl: 3    lisinopril (ZESTRIL) 20 mg tablet, , Disp: , Rfl:     lisinopril (ZESTRIL) 5 mg tablet, Take 1 tablet (5 mg total) by mouth daily, Disp: 30 tablet, Rfl: 3    metoprolol succinate (TOPROL-XL) 25 mg 24 hr tablet, Take 1 tablet (25 mg total) by mouth daily, Disp: 90 tablet, Rfl: 3    polyethylene glycol (MIRALAX) 17 g packet, Take 17 g by mouth daily (Patient taking differently: Take 17 g by mouth daily As needed), Disp: 510 g, Rfl: 0    rivaroxaban (Xarelto) 20 mg tablet, Take 1 tablet (20 mg total) by mouth daily with breakfast, Disp: 90 tablet, Rfl: 3    No Known Allergies    Objective:  Vitals:    11/11/24 1303   BP: 110/62       The patient was awake, alert, and oriented to person, place, and time.  No acute distress.  Normocephalic.  EOMI.  Mucous membranes moist.  Normal respiratory effort.     Examination of the affected extremity was compared to the unaffected extremity.  Skin was intact.  No swelling or ecchymosis.  No deformity.  Hand and fingers were warm and well-perfused.  Capillary refill was brisk.  Full active range of  motion of the elbows, forearms, wrists, and fingers.  5/5 elbow flexors/extensors, wrist flexor/extensors, and .      Negative shoulder sign. Negative thumb metacarpal adduction.  Negative compensatory MCP joint hyperextension. There was tenderness at the thumb CMC joint. Grind test was Negative. Axial loading of the thumb produced No Pain.      Imaging/Diagnostic Studies:    No images at today's visit.         This document was created using speech voice recognition software.   Grammatical errors, random word insertions, pronoun errors, and incomplete sentences are an occasional consequence of this system due to software limitations, ambient noise, and hardware issues.   Any formal questions or concerns about content, text, or information contained within the body of this dictation should be directly addressed to the provider for clarification.    Scribe Attestation      I,:  Ajay Ramos am acting as a scribe while in the presence of the attending physician.:       I,:  Alexa Ford MD personally performed the services described in this documentation    as scribed in my presence.:

## 2024-12-06 DIAGNOSIS — I25.10 CORONARY ARTERY DISEASE INVOLVING NATIVE CORONARY ARTERY: ICD-10-CM

## 2024-12-06 RX ORDER — LISINOPRIL 5 MG/1
5 TABLET ORAL DAILY
Qty: 90 TABLET | Refills: 1 | Status: SHIPPED | OUTPATIENT
Start: 2024-12-06

## 2024-12-16 ENCOUNTER — NURSE TRIAGE (OUTPATIENT)
Age: 79
End: 2024-12-16

## 2024-12-16 NOTE — TELEPHONE ENCOUNTER
"Reason for Disposition   Nosebleed and needs instruction in correct technique of applying direct pressure    Answer Assessment - Initial Assessment Questions  1. AMOUNT OF BLEEDING: \"How bad is the bleeding?\" \"How much blood was lost?\" \"Has the bleeding stopped?\"      Small amount of bleeding, when wipes nose   2. ONSET: \"When did the nosebleed start?\"       The other day  3. FREQUENCY: \"How many nosebleeds have you had in the last 24 hours?\"       Blot nose saw bleeding  4. RECURRENT SYMPTOMS: \"Have there been other recent nosebleeds?\" If Yes, ask: \"How long did it take you to stop the bleeding?\" \"What worked best?\"       No actual nose bleeds just when wipes nose is noting some blood  5. CAUSE: \"What do you think caused this nosebleed?\"      Pt is on blood thinner  6. LOCAL FACTORS: \"Do you have any cold symptoms?\", \"Have you been rubbing or picking at your nose?\"      Pt thought had a cold coming on  7. SYSTEMIC FACTORS: \"Do you have high blood pressure or any bleeding problems?\"  Hx of afib        8. BLOOD THINNERS: \"Do you take any blood thinners?\" (e.g., aspirin, clopidogrel / Plavix, coumadin, heparin). Notes: Other strong blood thinners include: Arixtra (fondaparinux), Eliquis (apixaban), Pradaxa (dabigatran), and Xarelto (rivaroxaban).      Xarelto  9. OTHER SYMPTOMS: \"Do you have any other symptoms?\" (e.g., lightheadedness)    Protocols used: Nosebleed-Adult-[place]    "

## 2024-12-20 ENCOUNTER — TELEPHONE (OUTPATIENT)
Age: 79
End: 2024-12-20

## 2024-12-20 NOTE — TELEPHONE ENCOUNTER
Reason for Conversation: Pt called to change and appt due to a conflict and has some concerns of recent vitals  HR averaging 48, 49 on pulse ox  /83, before meds, two hours later 148/78, 146/77. Pt stated he is also know to be hypotensive that required an adjustment in September.   Chest discomfort, tachycardia 4-5 days ago that has not returned  Pt stated he never received [facility]io results from September. [employer] called the office with rapid aflutter notifications.    Pt scheduled in [place] 12/26 and advised to call back or report to ED with any additional concerns.     VS/Weight: (Note: Please include date/time vitals/weight were measured)  HR averaging 48, 49 on pulse ox  /83, before meds, two hours later 148/78, 146/77,    Pain: No    Risk Factors: Hypertension, PAF, TAVR    Recent relevant testing and date of testing: Other [facility]  9/30/24    Medication: lisiinopril, flecainide 50 mg, xarelto 20 mg, Toprol 25 mg daily    Upcoming Office Visit: Yes    Last Office Visit: 9/11/24 w/ Dr Page

## 2024-12-26 ENCOUNTER — OFFICE VISIT (OUTPATIENT)
Dept: CARDIOLOGY CLINIC | Facility: CLINIC | Age: 79
End: 2024-12-26
Payer: COMMERCIAL

## 2024-12-26 VITALS
HEIGHT: 69 IN | HEART RATE: 51 BPM | SYSTOLIC BLOOD PRESSURE: 110 MMHG | DIASTOLIC BLOOD PRESSURE: 64 MMHG | WEIGHT: 184 LBS | BODY MASS INDEX: 27.25 KG/M2

## 2024-12-26 DIAGNOSIS — I48.0 PAF (PAROXYSMAL ATRIAL FIBRILLATION) (HCC): Primary | ICD-10-CM

## 2024-12-26 DIAGNOSIS — I10 PRIMARY HYPERTENSION: ICD-10-CM

## 2024-12-26 DIAGNOSIS — Z95.2 S/P TAVR (TRANSCATHETER AORTIC VALVE REPLACEMENT): ICD-10-CM

## 2024-12-26 PROCEDURE — 93000 ELECTROCARDIOGRAM COMPLETE: CPT

## 2024-12-26 PROCEDURE — 99214 OFFICE O/P EST MOD 30 MIN: CPT

## 2024-12-26 RX ORDER — METOPROLOL TARTRATE 25 MG/1
12.5 TABLET, FILM COATED ORAL EVERY 12 HOURS SCHEDULED
Qty: 60 TABLET | Refills: 1 | Status: SHIPPED | OUTPATIENT
Start: 2024-12-26

## 2024-12-26 NOTE — PROGRESS NOTES
Cardiology Follow Up    Song Camargo  1945  [id number]  [facility]  [address]  [address]  [facility] [id number] [phone]    1. PAF (paroxysmal atrial fibrillation) (HCC)        2. Primary hypertension        3. S/P TAVR (transcatheter aortic valve replacement)          Testing reviewed for this visit:  Extended Holter Monitor 09/2024: HR of 44 bpm to 174 bpm,avg HR of 64 bpm. Predominant underlying rhythm was Sinus Rhythm.   20 Supraventricular Tachycardia runs occurred, the run with the fastest interval lasting 7 beats with a max rate of 174 bpm, the longest lasting 20 beats with an avg rate of 129 bpm.   Atrial Flutter occurred (22% burden), ranging from  bpm (avg of 87 bpm), the longest lasting 20 hours 14 mins with an avg rate of 83 bpm.   Labs from 11/8/2024 reviewed, patient without any abnormalities.      Discussion/Summary:    1.)  Paroxysmal atrial fibrillation/flutter: Patient with paroxysmal atrial fibrillation, first diagnosed while patient was participating in cardiac rehab, postop TAVR earlier in 2024.  [id number]: 4  Anticoagulation: Xarelto 20 mg daily  Rate control: Metoprolol succinate 25 mg daily  Rhythm control: Flecainide 50 mg twice daily    Patient reports noting more bradycardia when checking his blood pressure in the last month with heart rates occasionally in the 40s. Patient also noting lightheadedness and dizziness typically occurring when going from lying to sitting or sitting to standing for the last 2 months.    Patient states he typically does not feel when he is in atrial fibrillation or flutter but approximately week ago he had a short period of increased palpitations, he associated this with atrial flutter.     Will reduce patient's metoprolol from metoprolol succinate 25 mg to metoprolol to tartrate 12.5 mg every 12 hours.    Will order an extended ZIO monitor, to  assess for atrial fibrillation burden, along with monitoring bradycardiac episodes.   Patient states he is unsure if he was on flecainide with his last  Zio monitor in September.     Referral placed to electrophysiology for further evaluation of patient's rhythm and rhythm management.    2.) Hypertension: Patient with history of high blood pressure, adjustments made to his blood pressure medications after his TAVR are secondary to hypotension.  Patient currently on lisinopril 5 mg daily, metoprolol succinate 25 mg daily.  Patient has been monitoring his blood pressure twice daily secondary to lightheadedness and dizziness, he has noted that his systolic blood pressure at times has been 90s 160s.    Patient will continue to monitor his blood pressure at this time, no other recommendations or changes.    3.) Nonrheumatic aortic valve stenosis, status post Wu 29 mm TAVR on 04/16/2024.  Echocardiogram on 5/13/2024 showed well-seated valve functioning normally.  EF 60%, mild LA dilatation.    4.) Nonobstructive coronary artery disease: Cardiac cath 3/13/2024 showed 20% left main, 30% ostial circumflex, and 30 to 40% RCA disease.  Patient denies chest pain, pressure, shortness of breath, fatigue.    Patient will follow-up in 1 month or sooner if necessary    Interval History:   Song Camargo is a 79 y.o. male with a past medical history of hypertension, aortic stenosis status post TAVR, hyperlipidemia, PVD here for urgent follow-up secondary to bradycardia and hypotension.    Patient had been doing well since his last visit with Dr. Page until approximately 2 months ago when he started to experience intermittent lightheadedness and dizziness.  This prompted him to begin taking his blood pressure heart rate more frequently, where he noted that he was more bradycardic at times with his heart rate in the 40s.  He also noted that he had variability in his blood pressure, sometimes his systolic was in the 90s and then  other times in the 160s.    Patient denies chest pain pressure, presyncope or syncopal episodes.    Medical Problems       Problem List       Aortic valve stenosis    Benign essential hypertension    Overview Signed 3/4/2024  2:22 PM by Jessy Wan PA-C   Last Assessment & Plan:    Formatting of this note might be different from the original.   Stable on home anti-HTN medications.         Family history of ischemic heart disease (IHD)    History of prostate cancer    Mixed hyperlipidemia    Overview Signed 3/4/2024  2:22 PM by Jessy Wan PA-C   Last Assessment & Plan:    Formatting of this note might be different from the original.   Continue statin therapy home dose.         Nonrheumatic aortic valve stenosis    Coronary artery disease involving native coronary artery    Lumbar radiculopathy    S/P TAVR (transcatheter aortic valve replacement)    Daily consumption of alcohol    Peripheral vascular disease, unspecified (HCC)        Past Medical History:   Diagnosis Date    Arthritis     Basal cell carcinoma (BCC)     Coronary artery disease     High blood pressure     High cholesterol     Lumbar radiculopathy 4/15/2024    Prostate CA (HCC)      Social History     Socioeconomic History    Marital status: /Civil Union     Spouse name: Not on file    Number of children: Not on file    Years of education: Not on file    Highest education level: Not on file   Occupational History    Not on file   Tobacco Use    Smoking status: Former     Current packs/day: 0.00     Types: Cigarettes     Quit date: 2004     Years since quittin.7    Smokeless tobacco: Never   Vaping Use    Vaping status: Never Used   Substance and Sexual Activity    Alcohol use: Yes     Alcohol/week: 8.0 standard drinks of alcohol     Types: 8 Glasses of wine per week     Comment: drinks one glass of wine on the weekdays and maybe two on the weekend    Drug use: Never    Sexual activity: Not on file   Other Topics Concern     Not on file   Social History Narrative    Not on file     Social Drivers of Health     Financial Resource Strain: Not on file   Food Insecurity: No Food Insecurity (4/17/2024)    Nursing - Inadequate Food Risk Classification     Worried About Running Out of Food in the Last Year: Never true     Ran Out of Food in the Last Year: Never true     Ran Out of Food in the Last Year: Not on file   Transportation Needs: No Transportation Needs (4/17/2024)    PRAPARE - Transportation     Lack of Transportation (Medical): No     Lack of Transportation (Non-Medical): No   Physical Activity: Not on file   Stress: Not on file   Social Connections: Not on file   Intimate Partner Violence: Not on file   Housing Stability: Low Risk  (4/17/2024)    Housing Stability Vital Sign     Unable to Pay for Housing in the Last Year: No     Number of Times Moved in the Last Year: 1     Homeless in the Last Year: No      Family History   Problem Relation Age of Onset    Sudden death Mother         cardiac    Hypertension Father     Coronary artery disease Father     Cancer Father     Hyperlipidemia Father      Past Surgical History:   Procedure Laterality Date    APPENDECTOMY      CARDIAC CATHETERIZATION N/A 3/13/2024    Procedure: Cardiac RHC/LHC;  Surgeon: Mir Sharpe MD;  [facility];  Service: Cardiology    CARDIAC CATHETERIZATION N/A 4/16/2024    Procedure: Cardiac tavr;  Surgeon: Mir Sharpe MD;  [facility];  Service: Cardiology    [facility]    [place] REPLACE AORTIC VALVE OPENFEMORAL ARTERY APPROACH N/A 4/16/2024    Procedure: REPLACEMENT AORTIC VALVE TRANSCATHETER (TAVR) TRANSFEMORAL W/ 23MM OLIVEIRA REGINE S3 UR VALVE(ACCESS ON LEFT) TEQUILA;  Surgeon: Jacob Macias DO;  Location: [facility];  Service: Cardiac Surgery    PROSTATECTOMY  2010    SHOULDER SURGERY Right     TONSILLECTOMY      VASECTOMY         Current Outpatient Medications:     acetaminophen (TYLENOL) 325 mg  "tablet, Take 2 tablets (650 mg total) by mouth every 4 (four) hours as needed for fever, mild pain, moderate pain or headaches (temperature greater than 101 F), Disp: , Rfl:     aspirin 81 mg chewable tablet, Chew 1 tablet (81 mg total) daily, Disp: , Rfl:     atorvastatin (LIPITOR) 40 mg tablet, Take 1 tablet (40 mg total) by mouth daily, Disp: 90 tablet, Rfl: 3    flecainide (TAMBOCOR) 50 mg tablet, Take 1 tablet (50 mg total) by mouth 2 (two) times a day, Disp: 180 tablet, Rfl: 3    lisinopril (ZESTRIL) 5 mg tablet, TAKE 1 TABLET (5 MG TOTAL) BY MOUTH DAILY., Disp: 90 tablet, Rfl: 1    metoprolol succinate (TOPROL-XL) 25 mg 24 hr tablet, Take 1 tablet (25 mg total) by mouth daily, Disp: 90 tablet, Rfl: 3    polyethylene glycol (MIRALAX) 17 g packet, Take 17 g by mouth daily, Disp: 510 g, Rfl: 0    rivaroxaban (Xarelto) 20 mg tablet, Take 1 tablet (20 mg total) by mouth daily with breakfast, Disp: 90 tablet, Rfl: 3  No Known Allergies    Labs:     Chemistry        Component Value Date/Time    K 4.2 11/08/2024 1041     11/08/2024 1041    CO2 32 11/08/2024 1041    CO2 24 04/16/2024 1216    BUN 9 11/08/2024 1041    CREATININE 0.92 11/08/2024 1041        Component Value Date/Time    CALCIUM 8.9 11/08/2024 1041    ALKPHOS 47 11/08/2024 1041    AST 15 11/08/2024 1041    ALT 15 11/08/2024 1041        ECG: Sinus bradycardia rate of 51      Review of Systems   Cardiovascular:  Positive for irregular heartbeat and palpitations. Negative for chest pain, dyspnea on exertion, leg swelling, near-syncope, orthopnea, paroxysmal nocturnal dyspnea and syncope.   Respiratory:  Negative for shortness of breath.    Neurological:  Positive for dizziness and light-headedness. Negative for weakness.       Vitals:    12/26/24 1255   Pulse: (!) 51     Vitals:    12/26/24 1255   Weight: 83.5 kg (184 lb)     Height: 5' 9\" (175.3 cm)   Body mass index is 27.17 kg/m².    Physical Exam:  Physical Exam  Constitutional:       Appearance: " Normal appearance.   HENT:      Head: Normocephalic and atraumatic.      Mouth/Throat:      Mouth: Mucous membranes are moist.   Cardiovascular:      Rate and Rhythm: Regular rhythm. Bradycardia present.      Pulses: Normal pulses.      Heart sounds: Normal heart sounds.   Pulmonary:      Effort: Pulmonary effort is normal.      Breath sounds: Normal breath sounds.   Abdominal:      General: Bowel sounds are normal.      Palpations: Abdomen is soft.   Musculoskeletal:      Cervical back: Normal range of motion.      Right lower leg: No edema.      Left lower leg: No edema.   Skin:     General: Skin is warm and dry.      Capillary Refill: Capillary refill takes less than 2 seconds.   Neurological:      General: No focal deficit present.      Mental Status: He is alert and oriented to person, place, and time.

## 2025-01-17 LAB
CV ZIO AF AFL AVG BPM: 92 BPM
CV ZIO AF AFL BPM HIGH: 155 BPM
CV ZIO AF AFL BPM LOW: 51 BPM
CV ZIO AF AFL F EPI AVG BPM: 101 BPM
CV ZIO AF AFL F EPI BPM HIGH: 155 BPM
CV ZIO AF AFL F EPI BPM LOW: 66 BPM
CV ZIO AF AFL F EPI DT: NORMAL
CV ZIO AF AFL L EPI AVG BPM: 86 BPM
CV ZIO AF AFL L EPI BPM HIGH: 149 BPM
CV ZIO AF AFL L EPI BPM LOW: 51 BPM
CV ZIO AF AFL L EPI DUR: NORMAL
CV ZIO AF AFL L EPI END: NORMAL
CV ZIO AF AFL L EPI START: NORMAL
CV ZIO AF AFL PERCENT: 14 %
CV ZIO AF AFL S EPI AVG BPM: 86 BPM
CV ZIO AF AFL S EPI BPM HIGH: 149 BPM
CV ZIO AF AFL S EPI BPM LOW: 51 BPM
CV ZIO AF AFL S EPI DT: NORMAL
CV ZIO AF AFL SYMPT IN PT: NORMAL
CV ZIO BASELINE AVG BPM: 59 BPM
CV ZIO BASELINE BPM HIGH: 155 BPM
CV ZIO BASELINE BPM LOW: 32 BPM
CV ZIO DEVICE ANALYSIS TIME: NORMAL
CV ZIO ECT SVE COUNT: 9902 EPISODES
CV ZIO ECT SVE CPLT COUNT: 176 EPISODES
CV ZIO ECT SVE CPLT FREQ: NORMAL
CV ZIO ECT SVE FREQ: NORMAL
CV ZIO ECT SVE TPLT COUNT: 77 EPISODES
CV ZIO ECT SVE TPLT FREQ: NORMAL
CV ZIO ECT VE COUNT: 1567 EPISODES
CV ZIO ECT VE CPLT COUNT: 6 EPISODES
CV ZIO ECT VE CPLT FREQ: NORMAL
CV ZIO ECT VE FREQ: NORMAL
CV ZIO ECT VE TPLT COUNT: 0 EPISODES
CV ZIO ECT VE TPLT FREQ: 0
CV ZIO ECTOPIC SVE COUPLET RAW PERCENT: 0.03 %
CV ZIO ECTOPIC SVE ISOLATED PERCENT: 0.84 %
CV ZIO ECTOPIC SVE TRIPLET RAW PERCENT: 0.02 %
CV ZIO ECTOPIC VE COUPLET RAW PERCENT: 0 %
CV ZIO ECTOPIC VE ISOLATED PERCENT: 0.13 %
CV ZIO ECTOPIC VE TRIPLET RAW PERCENT: 0 %
CV ZIO ENROLLMENT END: NORMAL
CV ZIO ENROLLMENT START: NORMAL
CV ZIO IRREG TYPE: NORMAL
CV ZIO L BIGEMINY DUR: 56.5 SEC
CV ZIO L BIGEMINY END: NORMAL
CV ZIO L BIGEMINY START: NORMAL
CV ZIO L TRIGEMINY DUR: 16.5 SEC
CV ZIO L TRIGEMINY END: NORMAL
CV ZIO L TRIGEMINY START: NORMAL
CV ZIO PATIENT EVENTS DIARIES: 4
CV ZIO PATIENT EVENTS TRIGGERS: 0
CV ZIO PAUSE COUNT: 0
CV ZIO PRESCRIPTION STATUS: NORMAL
CV ZIO SVT COUNT: 0
CV ZIO TOTAL  ENROLLMENT PERIOD: NORMAL
CV ZIO VT COUNT: 0

## 2025-01-23 ENCOUNTER — OFFICE VISIT (OUTPATIENT)
Dept: CARDIOLOGY CLINIC | Facility: CLINIC | Age: 80
End: 2025-01-23
Payer: COMMERCIAL

## 2025-01-23 VITALS
WEIGHT: 179 LBS | HEIGHT: 69 IN | DIASTOLIC BLOOD PRESSURE: 78 MMHG | BODY MASS INDEX: 26.51 KG/M2 | HEART RATE: 56 BPM | SYSTOLIC BLOOD PRESSURE: 140 MMHG

## 2025-01-23 DIAGNOSIS — I48.0 PAF (PAROXYSMAL ATRIAL FIBRILLATION) (HCC): Primary | ICD-10-CM

## 2025-01-23 DIAGNOSIS — I10 BENIGN ESSENTIAL HYPERTENSION: ICD-10-CM

## 2025-01-23 DIAGNOSIS — Z95.2 S/P TAVR (TRANSCATHETER AORTIC VALVE REPLACEMENT): ICD-10-CM

## 2025-01-23 DIAGNOSIS — I25.10 CORONARY ARTERY DISEASE INVOLVING NATIVE CORONARY ARTERY OF NATIVE HEART WITHOUT ANGINA PECTORIS: ICD-10-CM

## 2025-01-23 PROCEDURE — 99213 OFFICE O/P EST LOW 20 MIN: CPT

## 2025-01-23 RX ORDER — FLECAINIDE ACETATE 50 MG/1
100 TABLET ORAL 2 TIMES DAILY
Qty: 180 TABLET | Refills: 3 | Status: SHIPPED | OUTPATIENT
Start: 2025-01-23

## 2025-01-23 NOTE — PROGRESS NOTES
Cardiology Follow Up    Song Camargo  1945  [id number]  [facility] [place]  [place] [address]  [facility]  [facility] [id number] [phone]    1. PAF (paroxysmal atrial fibrillation) (HCC)  flecainide (TAMBOCOR) 50 mg tablet      2. S/P TAVR (transcatheter aortic valve replacement)        3. Coronary artery disease involving native coronary artery of native heart without angina pectoris        4. Benign essential hypertension            Discussion/Summary:  1.)  Paroxysmal atrial fibrillation/flutter: Patient with paroxysmal atrial fibrillation, first diagnosed while patient was participating in cardiac rehab, postop TAVR earlier in 2024.  On her last visit patient was experiencing frequent periods of dizziness with a heart rate averaging in the 40s.  We reduced his metoprolol at that visit and patient states that he has been feeling much better with few episodes of dizziness.  Patient has an appointment with EP set up in the near future for further recommendations.    [id number]: 4  Anticoagulation: Xarelto 20 mg daily  Rate control: Metoprolol succinate 12.5 mg daily  Rhythm control: Flecainide 50 mg twice daily -will increase flecainide to 100 mg twice daily today with ZIO report findings    2.) Hypertension: Patient with history of high blood pressure, adjustments made to his blood pressure medications after his TAVR are secondary to hypotension.  Patient on lisinopril 5 mg daily.  Patient takes his blood pressure at home reports that it is 100s to 140s systolic.  Would not recommend any changes make any changes.      3.) Nonrheumatic aortic valve stenosis, status post Wu 29 mm TAVR on 04/16/2024.  Echocardiogram on 5/13/2024 showed well-seated valve functioning normally.     4.) Nonobstructive coronary artery disease: Cardiac cath 3/13/2024 showed 20% left main, 30% ostial circumflex, and 30 to 40% RCA disease.   Patient denies chest pain, pressure, shortness of breath, fatigue.  Patient maintained on aspirin 81 mg, metoprolol tartrate 12.5 mg twice daily and atorvastatin 40 mg daily    Diagnostics:   14-day Zio: Minimum heart rate of 32 Max heart rate 55, average heart rate 59, 14% A-fib burden with the longest lasting 19 hours and an average rate of 86, rare ectopy  TTE 05/13/24: EF 60%, LAE mild dilatation, bioprosthetic valve well-seated functioning normally, trace MR, trace TR    Interval History:  Song Camargo is a 79 y.o. male with a past medical history as below, here for 1 month follow-up.  Patient is a patient of Dr. Page's     At his last visit he had complaints of intermittent lightheadedness and dizziness, he noted that his heart rate was in the 40s and that his systolic blood pressure was sometimes in the 90s.  We adjusted his metoprolol to 12.5 mg twice daily, and checked an ambulatory rhythm monitor to assess for atrial fib burden.  Zio report as above.    Patient denies chest pain pressure, presyncope or syncopal episodes.  He states his episodes of dizziness are rare and he overall feels better than his last visit.    He is scheduled to follow-up with EP late next week.       Medical Problems       Problem List       Aortic valve stenosis    Benign essential hypertension        Family history of ischemic heart disease (IHD)    History of prostate cancer    Mixed hyperlipidemia        Nonrheumatic aortic valve stenosis    Coronary artery disease involving native coronary artery    Lumbar radiculopathy    S/P TAVR (transcatheter aortic valve replacement)    Daily consumption of alcohol    Peripheral vascular disease, unspecified (HCC)        Past Medical History:   Diagnosis Date    Arthritis     Basal cell carcinoma (BCC)     Coronary artery disease     High blood pressure     High cholesterol     Lumbar radiculopathy 4/15/2024    Prostate CA (HCC)      Social History     Socioeconomic History    Marital  status: /Civil Union     Spouse name: Not on file    Number of children: Not on file    Years of education: Not on file    Highest education level: Not on file   Occupational History    Not on file   Tobacco Use    Smoking status: Former     Current packs/day: 0.00     Types: Cigarettes     Quit date: 2004     Years since quittin.8    Smokeless tobacco: Never   Vaping Use    Vaping status: Never Used   Substance and Sexual Activity    Alcohol use: Yes     Alcohol/week: 8.0 standard drinks of alcohol     Types: 8 Glasses of wine per week     Comment: drinks one glass of wine on the weekdays and maybe two on the weekend    Drug use: Never    Sexual activity: Not on file   Other Topics Concern    Not on file   Social History Narrative    Not on file     Social Drivers of Health     Financial Resource Strain: Not on file   Food Insecurity: No Food Insecurity (2024)    Nursing - Inadequate Food Risk Classification     Worried About Running Out of Food in the Last Year: Never true     Ran Out of Food in the Last Year: Never true     Ran Out of Food in the Last Year: Not on file   Transportation Needs: No Transportation Needs (2024)    PRAPARE - Transportation     Lack of Transportation (Medical): No     Lack of Transportation (Non-Medical): No   Physical Activity: Not on file   Stress: Not on file   Social Connections: Not on file   Intimate Partner Violence: Not on file   Housing Stability: Low Risk  (2024)    Housing Stability Vital Sign     Unable to Pay for Housing in the Last Year: No     Number of Times Moved in the Last Year: 1     Homeless in the Last Year: No      Family History   Problem Relation Age of Onset    Sudden death Mother         cardiac    Hypertension Father     Coronary artery disease Father     Cancer Father     Hyperlipidemia Father      Past Surgical History:   Procedure Laterality Date    APPENDECTOMY      CARDIAC CATHETERIZATION N/A 3/13/2024    Procedure: Cardiac  C/C;  Surgeon: Mir Sharpe MD;  [facility];  Service: Cardiology    CARDIAC CATHETERIZATION N/A 4/16/2024    Procedure: Cardiac tavr;  Surgeon: Mir Sharpe MD;  [facility];  Service: Cardiology    COLONOSCOPY  2014    [facility] SURGERY  2004    [place] REPLACE AORTIC VALVE OPENFEMORAL ARTERY APPROACH N/A 4/16/2024    Procedure: REPLACEMENT AORTIC VALVE TRANSCATHETER (TAVR) TRANSFEMORAL W/ 23MM OLIVEIRA REGINE S3 UR VALVE(ACCESS ON LEFT) TEQUILA;  Surgeon: Jacob Macias DO;  [facility];  Service: Cardiac Surgery    PROSTATECTOMY  2010    SHOULDER SURGERY Right     TONSILLECTOMY      VASECTOMY         Current Outpatient Medications:     acetaminophen (TYLENOL) 325 mg tablet, Take 2 tablets (650 mg total) by mouth every 4 (four) hours as needed for fever, mild pain, moderate pain or headaches (temperature greater than 101 F), Disp: , Rfl:     aspirin 81 mg chewable tablet, Chew 1 tablet (81 mg total) daily, Disp: , Rfl:     atorvastatin (LIPITOR) 40 mg tablet, Take 1 tablet (40 mg total) by mouth daily, Disp: 90 tablet, Rfl: 3    flecainide (TAMBOCOR) 50 mg tablet, Take 2 tablets (100 mg total) by mouth 2 (two) times a day, Disp: 180 tablet, Rfl: 3    lisinopril (ZESTRIL) 5 mg tablet, TAKE 1 TABLET (5 MG TOTAL) BY MOUTH DAILY., Disp: 90 tablet, Rfl: 1    metoprolol tartrate (LOPRESSOR) 25 mg tablet, Take 0.5 tablets (12.5 mg total) by mouth every 12 (twelve) hours, Disp: 60 tablet, Rfl: 1    polyethylene glycol (MIRALAX) 17 g packet, Take 17 g by mouth daily, Disp: 510 g, Rfl: 0    rivaroxaban (Xarelto) 20 mg tablet, Take 1 tablet (20 mg total) by mouth daily with breakfast, Disp: 90 tablet, Rfl: 3  No Known Allergies    Labs:     Chemistry        Component Value Date/Time    K 4.2 11/08/2024 1041     11/08/2024 1041    CO2 32 11/08/2024 1041    CO2 24 04/16/2024 1216    BUN 9 11/08/2024 1041    CREATININE 0.92 11/08/2024 1041        Component Value Date/Time    CALCIUM 8.9  "11/08/2024 1041    ALKPHOS 47 11/08/2024 1041    AST 15 11/08/2024 1041    ALT 15 11/08/2024 1041        Imaging: [employer]oo Monitor  Result Date: 1/17/2025  Narrative: Patient had a min HR of 32 bpm, max HR of 155 bpm, and avg HR of 59 bpm. Predominant underlying rhythm was Sinus Rhythm. Atrial Fibrillation/Flutter occurred (14% burden), ranging from  bpm (avg of 92 bpm), the longest lasting 19 hours 1 min with an avg rate of 86 bpm. Atrial Flutter may be possible Atrial Tachycardia with variable block. Atrial Fibrillation/Flutter was detected within +/- 45 seconds of symptomatic patient event(s). Isolated SVEs were rare (<1.0%), SVE Couplets were rare (<1.0%), and SVE Triplets were rare (<1.0%). Isolated VEs were rare (<1.0%), VE Couplets were rare (<1.0%), and no VE Triplets were present. Ventricular Bigeminy and Trigeminy were present.     Review of Systems   All other systems reviewed and are negative.      Vitals:    01/23/25 1242   BP: 140/78   Pulse: 56     Vitals:    01/23/25 1242   Weight: 81.2 kg (179 lb)     Height: 5' 9\" (175.3 cm)   Body mass index is 26.43 kg/m².    Physical Exam:  Physical Exam  Constitutional:       Appearance: Normal appearance.   HENT:      Head: Normocephalic and atraumatic.      Mouth/Throat:      Mouth: Mucous membranes are moist.   Cardiovascular:      Rate and Rhythm: Normal rate and regular rhythm.      Pulses: Normal pulses.      Heart sounds: Normal heart sounds.   Pulmonary:      Effort: Pulmonary effort is normal.      Breath sounds: Normal breath sounds.   Abdominal:      General: Bowel sounds are normal.      Palpations: Abdomen is soft.   Musculoskeletal:      Right lower leg: No edema.      Left lower leg: No edema.   Skin:     General: Skin is warm and dry.      Capillary Refill: Capillary refill takes less than 2 seconds.   Neurological:      General: No focal deficit present.      Mental Status: He is alert and oriented to person, place, and time.         "

## 2025-02-17 ENCOUNTER — OFFICE VISIT (OUTPATIENT)
Dept: OBGYN CLINIC | Facility: CLINIC | Age: 80
End: 2025-02-17
Payer: COMMERCIAL

## 2025-02-17 VITALS — WEIGHT: 180 LBS | HEIGHT: 69 IN | BODY MASS INDEX: 26.66 KG/M2

## 2025-02-17 DIAGNOSIS — M18.11 ARTHRITIS OF CARPOMETACARPAL (CMC) JOINT OF RIGHT THUMB: Primary | ICD-10-CM

## 2025-02-17 PROCEDURE — 99212 OFFICE O/P EST SF 10 MIN: CPT | Performed by: ORTHOPAEDIC SURGERY

## 2025-02-17 RX ORDER — LISINOPRIL 20 MG/1
TABLET ORAL
COMMUNITY
Start: 2025-02-14

## 2025-02-17 NOTE — PROGRESS NOTES
ORTHOPAEDIC HAND, WRIST, AND ELBOW OFFICE  VISIT     Name: Song Camargo      : 1945      MRN: [id number]  Encounter Provider: Alxea Ford MD  Encounter Date: 2025   Encounter department: [facility]  :  Assessment & Plan  Arthritis of carpometacarpal (CMC) joint of right thumb  Subjective history, clinical exam, and diagnostic studies reviewed  Diagnosis discussed  Treatment options discussed which include nonoperative and operative management.  We discussed use of splinting, therapy, activity modification, use of NSAIDs (oral and topical), and injections.  We discussed risks and benefits of each as well as expected reasonable outcomes.  Surgery can be considered following unsuccessful treatment with nonoperative management.  I recommended reserving CSI for when other options do not provide lasting relief of symptoms.  Paraffin wax baths/dips can be helpful in alleviate arthritis pain.  Paraffin wax dips can also be helpful for joint pain.  Instructions for paraffin wax dips include: Warm wax, dipped hand and wax 3 times, placed plastic bag over hand for 15-20 minutes, then peel the wax from the hand and place the wax back into the paraffin wax container if you are the only one who uses the paraffin wax bath.  Arnica gel/cream is a homeopathic anti-inflammatory gel/cream that can be use on the affected area.  Available on [employer] website.  The patient was given the opportunity to ask questions.  Questions were answered to the patient's satisfaction.  The patient decided to move forward with OTC medication and using the comfort cool brace via shared decision making.  Follow up as needed.                 History of Present Illness   HPI  Chief Complaint   Patient presents with    Right Thumb - Follow-up, Pain       Song Camargo is a 79 y.o. male who presents today for follow up for right thumb cmc arthritis. He states his last injection did not  "provide him with much symptomatic relief. He reports having no pain in the thumb today. He has been using a variety of medications and braces to help with his pain.     Hand dominance: Right    REVIEW OF SYSTEMS:  General: no fever, no chills  HEENT:  No loss of hearing or eyesight problems  Eyes:  No red eyes  Respiratory:  No coughing, shortness of breath or wheezing  Cardiovascular:  No chest pain, no palpitations  GI:  Abdomen soft nontender, denies nausea  Endocrine:  No muscle weakness, no frequent urination, no excessive thirst  Urinary:  No dysuria, no incontinence  Musculoskeletal: see HPI and PE  SKIN:  No skin rash, no dry skin  Neurological:  No headaches, no confusion  Psychiatric:  No suicide thoughts, no anxiety, no depression  Review of all other systems is negative    Objective   Ht 5' 9\" (1.753 m)   Wt 81.6 kg (180 lb)   BMI 26.58 kg/m²      General: well developed and well nourished, alert, oriented times 3, and appears comfortable  Psychiatric: Normal  HEENT: Trachea Midline, No torticollis  Cardiovascular: No discernable arrhythmia  Pulmonary: No wheezing or stridor  Abdomen: No rebound or guarding  Extremities: No peripheral edema  Skin: No masses, erythema, lacerations, fluctation, ulcerations  Neurovascular: Sensation Intact to the Median, Ulnar, Radial Nerve, Motor Intact to the Median, Ulnar, Radial Nerve, and Pulses Intact    Musculoskeletal exam:  Skin was intact.  No swelling or ecchymosis.  No deformity.  Hand and fingers were warm and well-perfused.  Capillary refill was brisk.  Full active range of motion of the elbows, forearms, wrists, and fingers.      Negative shoulder sign. Negative thumb metacarpal adduction.  Negative compensatory MCP joint hyperextension. There was tenderness at the thumb CMC joint. Grind test was Negative. Axial loading of the thumb produced No Pain.       STUDIES REVIEWED:  No Studies to review      PROCEDURES PERFORMED:  Procedures  No Procedures " performed today      Scribe Attestation      I,:  Ajay Ramos am acting as a scribe while in the presence of the attending physician.:       I,:  Alexa Ford MD personally performed the services described in this documentation    as scribed in my presence.:

## 2025-02-26 ENCOUNTER — CONSULT (OUTPATIENT)
Dept: CARDIOLOGY CLINIC | Facility: CLINIC | Age: 80
End: 2025-02-26
Payer: COMMERCIAL

## 2025-02-26 VITALS
DIASTOLIC BLOOD PRESSURE: 80 MMHG | SYSTOLIC BLOOD PRESSURE: 148 MMHG | HEIGHT: 69 IN | BODY MASS INDEX: 27.11 KG/M2 | HEART RATE: 45 BPM | WEIGHT: 183 LBS

## 2025-02-26 DIAGNOSIS — I48.0 PAF (PAROXYSMAL ATRIAL FIBRILLATION) (HCC): ICD-10-CM

## 2025-02-26 DIAGNOSIS — I48.92 PAROXYSMAL ATRIAL FLUTTER (HCC): Primary | ICD-10-CM

## 2025-02-26 DIAGNOSIS — Z95.2 S/P TAVR (TRANSCATHETER AORTIC VALVE REPLACEMENT): ICD-10-CM

## 2025-02-26 DIAGNOSIS — E78.2 MIXED HYPERLIPIDEMIA: ICD-10-CM

## 2025-02-26 DIAGNOSIS — Z78.9 DAILY CONSUMPTION OF ALCOHOL: ICD-10-CM

## 2025-02-26 DIAGNOSIS — I48.0 PAROXYSMAL ATRIAL FIBRILLATION (HCC): ICD-10-CM

## 2025-02-26 PROCEDURE — 93000 ELECTROCARDIOGRAM COMPLETE: CPT | Performed by: STUDENT IN AN ORGANIZED HEALTH CARE EDUCATION/TRAINING PROGRAM

## 2025-02-26 PROCEDURE — 99214 OFFICE O/P EST MOD 30 MIN: CPT | Performed by: STUDENT IN AN ORGANIZED HEALTH CARE EDUCATION/TRAINING PROGRAM

## 2025-02-26 NOTE — PROGRESS NOTES
HEART AND VASCULAR  CARDIAC ELECTROPHYSIOLOGY   HEART RHYTHM CENTER  [facility]    Outpatient New Consult   for assessment and management paroxysmal atrial fibrillation/atrial flutter  Today's Date: 02/26/25        Patient name: Song Camargo  YOB: 1945  Sex: male         Chief Complaint: As above      ASSESSMENT:  Problem List Items Addressed This Visit       Mixed hyperlipidemia    S/P TAVR (transcatheter aortic valve replacement)    Daily consumption of alcohol     Other Visit Diagnoses         Paroxysmal atrial flutter (HCC)    -  Primary    Relevant Orders    POCT ECG      Paroxysmal atrial fibrillation (HCC)          PAF (paroxysmal atrial fibrillation) (HCC)        Relevant Medications    rivaroxaban (Xarelto) 20 mg tablet                  PLAN:  Paroxysmal atrial fibrillation/atrial flutter  -Atrial fibrillation and atrial flutter seen on Zio monitor  -Continue metoprolol succinate 12.5 mg daily  -Continue flecainide 50 mg every 12 hours  -Continue rivaroxaban 20 mg daily  -Patient considering if he would like to try alternative medication versus EP study and ablation    Tachybradycardia syndrome  -Patient has episodes of atrial fibrillation/atrial flutter with heart rates in the 150s, and has sinus bradycardia at 45 bpm on low-dose metoprolol/flecainide  -Given his baseline bradycardia, he will have limited ability to increase his AV eloise agents  -Discussed possibility of pacemaker to allow for up titration of medications versus EP study and ablation with medication discontinuation  -Patient is getting consider his options and contact the office when he is made a decision    Hypertension  -Continue lisinopril    History nonobstructive CAD  -Continue aspirin 81 mg daily  -Continue atorvastatin 40 mg daily      Follow up in: 6 months    Orders Placed This Encounter   Procedures    POCT ECG     Medications Discontinued During This Encounter   Medication Reason    rivaroxaban  (Xarelto) 20 mg tablet Reorder         .............................................................................................    HPI/Subjective:     Mr. Song Camargo is a 79-year-old gentleman with past medical history of nonobstructive CAD, hypertension, hyperlipidemia, severe AAS status post TAVR, and paroxysmal atrial fibrillation.  With regards to his atrial fibrillation this was found when he was in cardiac rehab.  He has been maintained on Xarelto and metoprolol as well as flecainide 50 mg every 12 hours.  He was recently seen in cardiology clinic where he was found to have episodes of dizziness with heart rates in the 40s.  His metoprolol was decreased at that time.    He underwent ZIO monitor revealing a 14% burden of atrial fibrillation/atrial flutter.  Heart rate variation on that monitor ranged from 32 bpm to a max heart rate is 155 bpm.  He was referred to electrophysiology for further evaluation/management.    Today, Mr. Camargo notes that he feels well overall.  He does feel some dizziness lightheadedness when standing up but this resolves within 10 to 15 seconds.  He states that Xarelto was canceled their assistance program, and is switched insurance companies.  He would like to have a new prescription sent to evaluate the cost of this medication on his new prescription plan.    He presents in sinus bradycardia.  We discussed the fact that he has tachybradycardia syndrome with documented rapid heart rates when in atrial fibrillation, but then bradycardia and normal sinus rhythm.  We discussed that this will limit the ability to uptitrate AV eloise agents and other medications.  For this, we discussed the possibility of pacemaker implant to allow for up titration of AV eloise agents should that become necessary in the future.  This would also allow for AV node ablation in the future if his A-fib becomes difficult to control.    We discussed treating his atrial fibrillation and atrial flutter with  alternative agents such as dofetilide versus EP study and ablation.  We discussed the success rates of each of these interventions.  We discussed the risk involved with ablation which include but are not limited to bleeding, bruising, hematomas, fistulas, cardiac perforation requiring drain or surgical repair, damage to conduction system, damage to the esophagus or phrenic nerve, stroke, heart attack, death.  We discussed ablative approaches including cryoballoon, radiofrequency ablation, and the newer technology pulse field ablation.  He was told that in the future he may ultimately need to pacemaker regardless, but we could certainly start with an A-fib ablation to see if removal of his medications led to improved heart rates.    In the end, the patient noted he would like some time to consider his options.   He will contact the office when he is made final decision.    Lastly we discussed modifiable risk factors for atrial fibrillation including avoidance of alcohol, weight management, and treatment of sleep apnea.      Complete 12 point ROS reviewed and otherwise non pertinent or negative except as per HPI pertinent positives in Cardiovascular and Respiratory emphasized. Please see paper chart for outpatient clinic patients where the patient completed the 12 point ROS survey.           Past Medical History:   Diagnosis Date    Arthritis     Basal cell carcinoma (BCC)     Coronary artery disease     High blood pressure     High cholesterol     Lumbar radiculopathy 4/15/2024    Prostate CA (HCC)        No Known Allergies  I reviewed the Home Medication list and Allergies in the chart.   Scheduled Meds:  Current Outpatient Medications   Medication Sig Dispense Refill    acetaminophen (TYLENOL) 325 mg tablet Take 2 tablets (650 mg total) by mouth every 4 (four) hours as needed for fever, mild pain, moderate pain or headaches (temperature greater than 101 F)      aspirin 81 mg chewable tablet Chew 1 tablet (81 mg  total) daily      atorvastatin (LIPITOR) 40 mg tablet Take 1 tablet (40 mg total) by mouth daily 90 tablet 3    flecainide (TAMBOCOR) 50 mg tablet Take 2 tablets (100 mg total) by mouth 2 (two) times a day 180 tablet 3    lisinopril (ZESTRIL) 5 mg tablet TAKE 1 TABLET (5 MG TOTAL) BY MOUTH DAILY. 90 tablet 1    metoprolol tartrate (LOPRESSOR) 25 mg tablet Take 0.5 tablets (12.5 mg total) by mouth every 12 (twelve) hours 60 tablet 1    polyethylene glycol (MIRALAX) 17 g packet Take 17 g by mouth daily 510 g 0    rivaroxaban (Xarelto) 20 mg tablet Take 1 tablet (20 mg total) by mouth daily with breakfast 30 tablet 6    lisinopril (ZESTRIL) 20 mg tablet  (Patient not taking: Reported on 2025)       No current facility-administered medications for this visit.     PRN Meds:.        Family History   Problem Relation Age of Onset    Sudden death Mother         cardiac    Hypertension Father     Coronary artery disease Father     Cancer Father     Hyperlipidemia Father        Social History     Socioeconomic History    Marital status: /Civil Union     Spouse name: Not on file    Number of children: Not on file    Years of education: Not on file    Highest education level: Not on file   Occupational History    Not on file   Tobacco Use    Smoking status: Former     Current packs/day: 0.00     Types: Cigarettes     Quit date: 2004     Years since quittin.9    Smokeless tobacco: Never   Vaping Use    Vaping status: Never Used   Substance and Sexual Activity    Alcohol use: Yes     Alcohol/week: 8.0 standard drinks of alcohol     Types: 8 Glasses of wine per week     Comment: drinks one glass of wine on the weekdays and maybe two on the weekend    Drug use: Never    Sexual activity: Not on file   Other Topics Concern    Not on file   Social History Narrative    Not on file     Social Drivers of Health     Financial Resource Strain: Not on file   Food Insecurity: No Food Insecurity (2024)    Nursing -  "Inadequate Food Risk Classification     Worried About Running Out of Food in the Last Year: Never true     Ran Out of Food in the Last Year: Never true     Ran Out of Food in the Last Year: Not on file   Transportation Needs: No Transportation Needs (4/17/2024)    PRAPARE - Transportation     Lack of Transportation (Medical): No     Lack of Transportation (Non-Medical): No   Physical Activity: Not on file   Stress: Not on file   Social Connections: Not on file   Intimate Partner Violence: Not on file   Housing Stability: Low Risk  (4/17/2024)    Housing Stability Vital Sign     Unable to Pay for Housing in the Last Year: No     Number of Times Moved in the Last Year: 1     Homeless in the Last Year: No         OBJECTIVE:    /80 (BP Location: Left arm, Patient Position: Sitting, Cuff Size: Standard)   Pulse (!) 45   Ht 5' 9\" (1.753 m)   Wt 83 kg (183 lb)   BMI 27.02 kg/m²   Vitals:    02/26/25 1316   Weight: 83 kg (183 lb)     GEN: No acute distress, Alert and oriented, well appearing  HEENT: Normocephalic, atraumatic  CARDIOVASCULAR: Bradycardic, RRR, No murmur, rub, gallops S1,S2  LUNGS: Clear To auscultation bilaterally, normal effort, no rales, rhonchi, crackles   ABDOMEN:  nondistended,  without obvious organomegaly or ascites  EXTREMITIES/VASCULAR:  No edema. warm an well perfused.  PSYCH: Normal Affect,  linear speech pattern without evidence of psychosis.   NEURO: Grossly intact, moving all extremiteis equal, face symmetric, alert and responsive, no obvious focal defecits   GAIT:  Ambulates normally without difficulty  HEME: No bleeding, bruising, petechia, purpura   SKIN: No significant rashes on visibile skin, warm, no diaphoresis or pallor.     Lab Results:       LABS:      Chemistry        Component Value Date/Time    K 4.2 11/08/2024 1041     11/08/2024 1041    CO2 32 11/08/2024 1041    CO2 24 04/16/2024 1216    BUN 9 11/08/2024 1041    CREATININE 0.92 11/08/2024 1041        Component " "Value Date/Time    CALCIUM 8.9 11/08/2024 1041    ALKPHOS 47 11/08/2024 1041    AST 15 11/08/2024 1041    ALT 15 11/08/2024 1041            No results found for: \"CHOL\"  No results found for: \"HDL\"  No results found for: \"LDLCALC\"  No results found for: \"TRIG\"  No results found for: \"CHOLHDL\"    IMAGING: No results found.     Cardiac testing:              I reviewed and interpreted the following LABS/EKG/TELE/IMAGING and below is summary of my interpretation (if data available):    LABS:    Current EKG performed at today's visit and read by me personally reveals sinus bradycardia.  QTc is inappropriately generated by EKG.  QTc is 416 ms when calculated by hand    Zio monitor performed 12/26/2024  Patient had a min HR of 32 bpm, max HR of 155 bpm, and avg HR of 59 bpm. Predominant underlying rhythm was Sinus Rhythm. Atrial Fibrillation/Flutter occurred (14% burden), ranging from  bpm (avg of 92 bpm), the longest lasting 19 hours 1 min with an avg rate of 86 bpm. Atrial Flutter may be possible Atrial Tachycardia with variable block. Atrial Fibrillation/Flutter was detected within +/- 45 seconds of symptomatic patient event(s). Isolated SVEs were rare (<1.0%), SVE Couplets were rare (<1.0%), and SVE Triplets were rare (<1.0%). Isolated VEs were rare (<1.0%), VE Couplets were rare (<1.0%), and no VE Triplets were present. Ventricular Bigeminy and Trigeminy were present.     ECHO 5/13/2024    Left Ventricle: Left ventricular cavity size is normal. Wall thickness is mildly increased. There is mild concentric hypertrophy. The left ventricular ejection fraction is 60%. Systolic function is normal. Wall motion is normal. Diastolic function is normal.    Left Atrium: The atrium is mildly dilated.    Aortic Valve: There is an Wu REGINE 3 Ultra 29 mm TAVR bioprosthetic valve. The prosthetic valve appears well-seated and appears to be functioning normally. There is no evidence of regurgitation. There is no evidence of " paravalvular regurgitation. There is no evidence of transvalvular regurgitation. The aortic valve has no significant stenosis. The aortic valve peak velocity is 2.48 m/s. The aortic valve mean gradient is 14 mmHg. The dimensionless velocity index is 0.48. The aortic valve area is 2.09 cm2.

## 2025-02-27 ENCOUNTER — NURSE TRIAGE (OUTPATIENT)
Age: 80
End: 2025-02-27

## 2025-02-27 DIAGNOSIS — I48.0 PAF (PAROXYSMAL ATRIAL FIBRILLATION) (HCC): Primary | ICD-10-CM

## 2025-02-27 NOTE — TELEPHONE ENCOUNTER
Called, spoke to pt. Message relayed as given. Pt will use the tablets he has at home for now and let us know if he needs a new rx once he sees how medication adjustment is working.

## 2025-02-27 NOTE — TELEPHONE ENCOUNTER
"Reason for Conversation: Pt concerned BP's elevated at home recently  This morning before meds 176/82, 48, hour after meds 173/84, 50    Pt denies any symptoms and saw Dr Luther yesterday   /80, HR 45  1/31/25 PCP appt 124/82, HR 58  1/23/25 with Maggie Avery /78, HR 56    Pt using an Omron cuff approx 2 years old and stated it was accurate when he took it to PCP appt.    Currently taking:  Lisinopril 5 mg, Lopressor 12.5 mg BID, flecainide 100 mg BID, Xarelto 20 mg.    VS/Weight: (Note: Please include date/time vitals/weight were measured)    148/80    Pain: No    Risk Factors: Hypertension    Recent relevant testing and date of testing: Other o  12/26/24    Medication: Lisinopril 5 mg, Lopressor 12.5 mg BID, flecainide 100 mg BID, Xarelto 20 mg.      Upcoming Office Visit: Yes, August    Last Office Visit: Dr Luther yesterday, Maggie 1/23/25       Answer Assessment - Initial Assessment Questions  1. BLOOD PRESSURE: \"What is your blood pressure?\" \"Did you take at least two measurements 5 minutes apart?\"      173/84  2. ONSET: \"When did you take your blood pressure?\"      This morning  3. HOW: \"How did you take your blood pressure?\" (e.g., automatic home BP monitor, visiting nurse)      Arm cuff  4. HISTORY: \"Do you have a history of high blood pressure?\"      yes  5. MEDICINES: \"Are you taking any medicines for blood pressure?\" \"Have you missed any doses recently?\"      yes  6. OTHER SYMPTOMS: \"Do you have any symptoms?\" (e.g., blurred vision, chest pain, difficulty breathing, headache, weakness)      denies    Protocols used: Blood Pressure - High-Adult-[place]    "

## 2025-02-28 DIAGNOSIS — I48.0 PAF (PAROXYSMAL ATRIAL FIBRILLATION) (HCC): ICD-10-CM

## 2025-02-28 DIAGNOSIS — I10 BENIGN ESSENTIAL HYPERTENSION: Primary | ICD-10-CM

## 2025-02-28 RX ORDER — LISINOPRIL 10 MG/1
10 TABLET ORAL DAILY
Qty: 90 TABLET | Refills: 1 | Status: SHIPPED | OUTPATIENT
Start: 2025-02-28

## 2025-02-28 NOTE — TELEPHONE ENCOUNTER
Pt will be due for f/u with provider in Aug - will refill to appt.   Rx forwarded to provider for approval.

## 2025-02-28 NOTE — TELEPHONE ENCOUNTER
Not a duplicate  Pharmacy told pt the script was canceled. I don't know if they got confused about the Lisinopril 20 mg being canceled.    Reason for call:   [x] Refill   [] Prior Auth  [] Other:     Office:   [] PCP/Provider -   [x] Specialty/Provider - Cardio    Medication: rivaroxaban (Xarelto) 20 mg tablet     Dose/Frequency: Take 1 tablet (20 mg total) by mouth daily with breakfast     Quantity: 90    [facility] - [place], [place] - [address]     Does the patient have enough for 3 days?   [x] Yes   [] No - Send as HP to POD

## 2025-02-28 NOTE — TELEPHONE ENCOUNTER
"Per Dr. Page:  \"I would suggest increasing his lisinopril to 10 mg daily and give it a week.\"  "

## 2025-02-28 NOTE — TELEPHONE ENCOUNTER
Pt called stating that he needs the Lisinopril 10mg  to go to [facility] pharmacy.     Pt is asking to have 3 refills also.

## 2025-03-03 ENCOUNTER — TELEPHONE (OUTPATIENT)
Age: 80
End: 2025-03-03

## 2025-03-03 NOTE — TELEPHONE ENCOUNTER
Caller: Song Camargo  Established Doctor: Ashkan/Aura  Call Back Number:     Does this patient require an office visit for Cardiac Clearance for upcomming surgery or can clearance be given without an office visit?       Date of Last Office Visit: 02/28/25 - Ashkan    Date Of Surgery: 4/10/25  Surgical Procedure: Cataract Extraction R eye  Name of Facility: [facility]  Phone Number for Surgical Facility (If the caller does not have this info, please put N/A): [phone]  Fax Number for Surgical Facility (If the caller does not have this info, please put N/A: [phone]    Other Comments: Dr. Hewitt, pt uploading PDF of CC      Thank You

## 2025-03-04 ENCOUNTER — TELEPHONE (OUTPATIENT)
Dept: CARDIOLOGY CLINIC | Facility: CLINIC | Age: 80
End: 2025-03-04

## 2025-03-04 NOTE — TELEPHONE ENCOUNTER
Patient dropped off a cardiac clearance form for cataract surgery being done on 4/10/25.      Faxed to roseline garner to be scanned into the chart & put a copy in the forms folder.      He would like a call to let him know this is complete.      Thank you.

## 2025-03-24 DIAGNOSIS — I35.0 NONRHEUMATIC AORTIC (VALVE) STENOSIS: ICD-10-CM

## 2025-03-24 DIAGNOSIS — Z95.2 S/P TAVR (TRANSCATHETER AORTIC VALVE REPLACEMENT): Primary | ICD-10-CM

## 2025-03-25 DIAGNOSIS — I48.0 PAF (PAROXYSMAL ATRIAL FIBRILLATION) (HCC): ICD-10-CM

## 2025-03-25 DIAGNOSIS — I48.92 PAROXYSMAL ATRIAL FLUTTER (HCC): ICD-10-CM

## 2025-03-25 DIAGNOSIS — I48.0 PAROXYSMAL ATRIAL FIBRILLATION (HCC): ICD-10-CM

## 2025-03-25 RX ORDER — FLECAINIDE ACETATE 100 MG/1
100 TABLET ORAL 2 TIMES DAILY
Qty: 180 TABLET | Refills: 1 | Status: SHIPPED | OUTPATIENT
Start: 2025-03-25

## 2025-03-25 RX ORDER — METOPROLOL TARTRATE 25 MG/1
12.5 TABLET, FILM COATED ORAL EVERY 12 HOURS SCHEDULED
Qty: 90 TABLET | Refills: 1 | Status: SHIPPED | OUTPATIENT
Start: 2025-03-25

## 2025-03-25 NOTE — TELEPHONE ENCOUNTER
Fax from [facility] requesting refills for Tambocor and Metoprolol.    Called, spoke to pt. He would like Tambocor changed to a 100 mg tablet bid for ease of administration.     Will reorder accordingly.

## 2025-03-31 ENCOUNTER — NURSE TRIAGE (OUTPATIENT)
Age: 80
End: 2025-03-31

## 2025-03-31 NOTE — TELEPHONE ENCOUNTER
"FOLLOW UP: Please contact patient with further recommendations.     REASON FOR CONVERSATION: Hypertension  Patient calling, his blood pressure has been running high recently, with heart rates in the mid-high 40's. Patient states he is occasionally dizzy and feels weak and tired. Patient denies any syncope, LOC.   Taking Metoprolol, Flecainide, Lisinopril daily.  Last seen by cardiology on 1/23/25, last seen by EP on 2/26/25.  Per last EP note, discussed possible pacemaker, ablation.    Please review and advise.      SYMPTOMS: weakness and dizziness on occasion, chest pain for a brief period last night, resolved on its own.     OTHER: Sat /75, hr 46  Monday- /80, hr 49 before meds  169/82, hr 49 after meds    DISPOSITION: Discuss With PCP and Callback by Nurse Within 1 Hour      Reason for Disposition   Systolic BP >= 180 OR Diastolic >= 110, and missed most recent dose of blood pressure medication    Answer Assessment - Initial Assessment Questions  1. BLOOD PRESSURE: \"What is your blood pressure?\" \"Did you take at least two measurements 5 minutes apart?\"      177/80, hr 49 before medication, 169/82, hr 49 after medications  2. ONSET: \"When did you take your blood pressure?\"      Today   3. HOW: \"How did you take your blood pressure?\" (e.g., automatic home BP monitor, visiting nurse)      Automatic bp cuff  4. HISTORY: \"Do you have a history of high blood pressure?\"      yes  5. MEDICINES: \"Are you taking any medicines for blood pressure?\" \"Have you missed any doses recently?\"      Metoprolol tartrate 12.5mg BID; Flecainide 100 mg BID; Lisinopril 10 mg daily  6. OTHER SYMPTOMS: \"Do you have any symptoms?\" (e.g., blurred vision, chest pain, difficulty breathing, headache, weakness)      Weakness, dizziness on occasion, chest pain briefly last night    Protocols used: Blood Pressure - High-Adult-[place]    "

## 2025-04-10 ENCOUNTER — TELEPHONE (OUTPATIENT)
Dept: CARDIOLOGY CLINIC | Facility: CLINIC | Age: 80
End: 2025-04-10

## 2025-04-21 ENCOUNTER — HOSPITAL ENCOUNTER (OUTPATIENT)
Dept: NON INVASIVE DIAGNOSTICS | Age: 80
Discharge: HOME | End: 2025-04-21
Attending: NURSE PRACTITIONER
Payer: COMMERCIAL

## 2025-04-21 VITALS
SYSTOLIC BLOOD PRESSURE: 174 MMHG | BODY MASS INDEX: 27.11 KG/M2 | HEIGHT: 69 IN | HEART RATE: 49 BPM | WEIGHT: 183 LBS | DIASTOLIC BLOOD PRESSURE: 88 MMHG

## 2025-04-21 DIAGNOSIS — Z95.2 S/P TAVR (TRANSCATHETER AORTIC VALVE REPLACEMENT): ICD-10-CM

## 2025-04-21 DIAGNOSIS — I35.0 NONRHEUMATIC AORTIC (VALVE) STENOSIS: ICD-10-CM

## 2025-04-21 LAB
AORTIC ROOT: 2.8 CM
AORTIC VALVE MEAN VELOCITY: 12.8 M/S
ASCENDING AORTA: 3.5 CM
AV AREA BY CONTINUOUS VTI: 1.3 CM2
AV AREA PEAK VELOCITY: 1.4 CM2
AV LVOT MEAN GRADIENT: 1 MMHG
AV LVOT PEAK GRADIENT: 2 MMHG
AV MEAN PRESS GRAD SYS DOP V1V2: 8 MMHG
AV ORIFICE AREA US: 1.31 CM2
AV PEAK GRADIENT: 15 MMHG
AV VELOCITY RATIO: 0.35
AV VMAX SYS DOP: 1.94 M/S
BSA FOR ECHO PROCEDURE: 1.99 M2
DOP CALC AO VTI: 48.14 CM
DOP CALC LVOT AREA: 3.8 CM2
DOP CALC LVOT CARDIAC INDEX: 1.52 L/MIN/M2
DOP CALC LVOT CARDIAC OUTPUT: 3.02 L/MIN
DOP CALC LVOT DIAMETER: 2.2 CM
DOP CALC LVOT PEAK VEL VTI: 16.62 CM
DOP CALC LVOT PEAK VEL: 0.72 M/S
DOP CALC LVOT STROKE INDEX: 32.2 ML/M2
DOP CALC LVOT STROKE VOLUME: 63.15
E WAVE DECELERATION TIME: 183 MS
E/A RATIO: 1.37
FRACTIONAL SHORTENING: 45 (ref 28–44)
INTERVENTRICULAR SEPTUM IN DIASTOLE (PARASTERNAL SHORT AXIS VIEW): 1 CM
INTERVENTRICULAR SEPTUM: 1 CM (ref 0.6–1.1)
LAAS-AP2: 18.8 CM2
LAAS-AP4: 15.3 CM2
LEFT ATRIUM SIZE: 4 CM
LEFT ATRIUM VOLUME (MOD BIPLANE): 48 ML
LEFT ATRIUM VOLUME INDEX (MOD BIPLANE): 24.1 ML/M2
LEFT INTERNAL DIMENSION IN SYSTOLE: 2.2 CM (ref 2.1–4)
LEFT VENTRICULAR INTERNAL DIMENSION IN DIASTOLE: 4 CM (ref 3.5–6)
LEFT VENTRICULAR POSTERIOR WALL IN END DIASTOLE: 0.9 CM
LEFT VENTRICULAR STROKE VOLUME: 54 ML
LV EF US.2D.A4C+ESTIMATED: 72 %
LVSV (TEICH): 54 ML
MITRAL REGURGITATION PEAK VELOCITY: 6.12 M/S
MITRAL VALVE MEAN INFLOW VELOCITY: 4.92 M/S
MITRAL VALVE REGURGITANT PEAK GRADIENT: 150 MMHG
MV E'TISSUE VEL-SEP: 10 CM/S
MV PEAK A VEL: 0.73 M/S
MV PEAK E VEL: 100 CM/S
MV STENOSIS PRESSURE HALF TIME: 53 MS
MV VALVE AREA P 1/2 METHOD: 4.15
RA PRESSURE ESTIMATED: 8 MMHG
RIGHT ATRIUM AREA SYSTOLE A4C: 19.1 CM2
RIGHT VENTRICLE ID DIMENSION: 3.4 CM
RV PSP: 43 MMHG
SL CV DOP CALC MV VTI RETROGRADE: 256.5 CM
SL CV LEFT ATRIUM LENGTH A2C: 5.3 CM
SL CV LV EF: 60
SL CV MV MEAN GRADIENT RETROGRADE: 106 MMHG
SL CV PED ECHO LEFT VENTRICLE DIASTOLIC VOLUME (MOD BIPLANE) 2D: 70 ML
SL CV PED ECHO LEFT VENTRICLE SYSTOLIC VOLUME (MOD BIPLANE) 2D: 16 ML
SL CV TR MAX PG ANTEGRADE: 31 MMHG
TR MAX PG: 35 MMHG
TR PEAK VELOCITY: 3 M/S
TRICUSPID ANNULAR PLANE SYSTOLIC EXCURSION: 2.9 CM
TRICUSPID VALVE PEAK REGURGITATION VELOCITY: 2.97 M/S
TV MEAN GRADIENT: 21 MMHG
TV MV D: 2.23 M/S
TV VALVE AREA BY CONTINUITY EQUATION: 0.65 CM2
TV VTI: 96.72 CM

## 2025-04-21 PROCEDURE — 93306 TTE W/DOPPLER COMPLETE: CPT

## 2025-04-21 PROCEDURE — 93306 TTE W/DOPPLER COMPLETE: CPT | Performed by: INTERNAL MEDICINE

## 2025-04-24 ENCOUNTER — OFFICE VISIT (OUTPATIENT)
Dept: CARDIAC SURGERY | Facility: CLINIC | Age: 80
End: 2025-04-24
Payer: COMMERCIAL

## 2025-04-24 VITALS
BODY MASS INDEX: 25.74 KG/M2 | WEIGHT: 173.8 LBS | SYSTOLIC BLOOD PRESSURE: 148 MMHG | DIASTOLIC BLOOD PRESSURE: 67 MMHG | OXYGEN SATURATION: 98 % | HEIGHT: 69 IN | HEART RATE: 47 BPM

## 2025-04-24 DIAGNOSIS — Z95.2 S/P TAVR (TRANSCATHETER AORTIC VALVE REPLACEMENT): Primary | ICD-10-CM

## 2025-04-24 PROCEDURE — 99214 OFFICE O/P EST MOD 30 MIN: CPT | Performed by: NURSE PRACTITIONER

## 2025-04-24 NOTE — PROGRESS NOTES
1 YEAR FOLLOW UP VISIT S/P TAVR    Procedure: S/P transcatheter aortic valve replacement 23 mm Wu REGINE 3 Ultra Resilia bioprosthesis, performed on 4/16/2024    History of Present Illness: Song Camargo is a 79 y.o. year old male who presents to our office today for routine one year follow up care from transcatheter aortic valve replacement.    Patient's PMHx is notable for AS (s/p TAVR 5/2024), non-obst CAD, PAF, TBS, HTN, HLD, arthritis, prostate cancer s/p prostatectomy, BCC s/p Moh's and lumbar radiculopathy.    Patient reports he is doing well. He denies chest pain, SOB, palpitations, edema, fatigue, or change in activity tolerance. He is having issues with Afib and tachy-baldomero syndrome and was recently seen by Dr. Luther in EP. He reports some positional lightheadedness with standing but it is transient, no presyncope or syncope.       Review of Systems:  Review of Systems - History obtained from chart review and the patient  General ROS: negative  Psychological ROS: negative  Ophthalmic ROS: positive for - uses glasses  ENT ROS: negative  Allergy and Immunology ROS: negative  Hematological and Lymphatic ROS: negative  Endocrine ROS: negative  Breast ROS: negative  Respiratory ROS: no cough, shortness of breath, or wheezing  Cardiovascular ROS: positive for - irregular heartbeat  negative for - chest pain, dyspnea on exertion, edema, loss of consciousness, orthopnea, or paroxysmal nocturnal dyspnea  Gastrointestinal ROS: no abdominal pain, change in bowel habits, or black or bloody stools  Genito-Urinary ROS: no dysuria, trouble voiding, or hematuria  Musculoskeletal ROS: negative  Neurological ROS: positive for - positional lightheadedness  Dermatological ROS: negative     Past Medical History:    Past Medical History:   Diagnosis Date    Arthritis     Basal cell carcinoma (BCC)     Coronary artery disease     High blood pressure     High cholesterol     Lumbar radiculopathy 4/15/2024    Prostate CA  "(HCC)        Past Surgical History:    Past Surgical History:   Procedure Laterality Date    APPENDECTOMY      CARDIAC CATHETERIZATION N/A 3/13/2024    Procedure: Cardiac RHC/LHC;  Surgeon: Mir Sharpe MD;  [facility];  Service: Cardiology    CARDIAC CATHETERIZATION N/A 4/16/2024    Procedure: Cardiac tavr;  Surgeon: Mir Sharpe MD;  Location: [facility];  Service: Cardiology    COLONOSCOPY  2014    MOHS SURGERY  2004    AK REPLACE AORTIC VALVE OPENFEMORAL ARTERY APPROACH N/A 4/16/2024    Procedure: REPLACEMENT AORTIC VALVE TRANSCATHETER (TAVR) TRANSFEMORAL W/ 23MM OLIVEIRA REGINE S3 UR VALVE(ACCESS ON LEFT) TEQUILA;  Surgeon: Jacob Macias DO;  Location: [facility];  Service: Cardiac Surgery    PROSTATECTOMY  2010    SHOULDER SURGERY Right     TONSILLECTOMY      VASECTOMY         Vital Signs:     Vitals:    04/24/25 1356   BP: 148/67   BP Location: Right arm   Patient Position: Sitting   Cuff Size: Standard   Pulse: (!) 47   SpO2: 98%   Weight: 78.8 kg (173 lb 12.8 oz)   Height: 5' 9\" (1.753 m)         Home Medications:     Prior to Admission medications    Medication Sig Start Date End Date Taking? Authorizing Provider   acetaminophen (TYLENOL) 325 mg tablet Take 2 tablets (650 mg total) by mouth every 4 (four) hours as needed for fever, mild pain, moderate pain or headaches (temperature greater than 101 F) 4/17/24   Jessy Wan PA-C   aspirin 81 mg chewable tablet Chew 1 tablet (81 mg total) daily 3/13/24   NIR Delgado   atorvastatin (LIPITOR) 40 mg tablet Take 1 tablet (40 mg total) by mouth daily 3/13/24   NIR Delgado   flecainide (TAMBOCOR) 100 mg tablet Take 1 tablet (100 mg total) by mouth 2 (two) times a day 3/25/25   Nate Page MD   lisinopril (ZESTRIL) 10 mg tablet Take 1 tablet (10 mg total) by mouth daily 2/28/25   Nate Page MD   metoprolol tartrate (LOPRESSOR) 25 mg tablet Take 0.5 tablets (12.5 mg total) by mouth every 12 (twelve) hours 3/25/25 "   Nate Page MD   polyethylene glycol (MIRALAX) 17 g packet Take 17 g by mouth daily 4/18/24 2/26/25  Jessy Wan PA-C   rivaroxaban (Xarelto) 20 mg tablet Take 1 tablet (20 mg total) by mouth daily with breakfast 3/3/25   Erick Luther MD       Allergies:    No Known Allergies    Physical Exam:    General: Alert, oriented, well developed, no acute distress  HEENT/NECK:  PERRLA.  No jugular venous distention.    Cardiac:Irregular rhythm, no murmurs rubs or gallops.  Pulmonary:Breath sounds clear bilaterally  Abdomen:  Non-tender, Non-distended.  Positive bowel sounds.  Upper extremities: 2+ radial pulses; brisk capillary refill  Lower extremities: Extremities warm/dry. PT/DP pulses 2+ bilaterally.  No edema B/L  Musculoskeletal: MAEE, stable gait  Neuro: Alert and oriented X 3.  Sensation is grossly intact.  No focal deficits  Skin: Warm/Dry, without rashes or lesions.    Lab Results:         Imaging Studies:     Echocardiogram:4/21/25       Left Ventricle: Left ventricular cavity size is normal. Wall thickness is normal. The left ventricular ejection fraction is 55-60%. Systolic function is normal. Wall motion is normal. Diastolic function is normal for age.    Right Ventricle: Right ventricular cavity size is normal. Systolic function is normal.    Right Atrium: The atrium is mildly dilated.    Aortic Valve: There is an Wu REGINE 3 Ultra Resilia 23 mm TAVR bioprosthetic valve. The prosthetic valve appears well-seated and appears to be functioning normally. Prosthetic valve leaflets are not well visualized. There is no evidence of paravalvular regurgitation. There is no evidence of transvalvular regurgitation. The gradient recorded across the prosthetic aortic valve is within the expected range. The aortic valve peak velocity is 1.94 m/s. AV mean gradient is 8 mmHg. DVI is 0.35.    Mitral Valve: There is mild regurgitation.    Tricuspid Valve: There is mild regurgitation.    Aorta: The aortic root is  normal in size.        Findings    Left Ventricle Left ventricular cavity size is normal. Wall thickness is normal. The left ventricular ejection fraction is 55-60%. Systolic function is normal. Wall motion is normal. Diastolic function is normal for age.   Right Ventricle Right ventricular cavity size is normal. Systolic function is normal.   Left Atrium The atrium is normal in size.   Right Atrium The atrium is mildly dilated.   Aortic Valve There is an Wu REGINE 3 Ultra Resilia 23 mm TAVR bioprosthetic valve. The prosthetic valve appears well-seated and appears to be functioning normally. Prosthetic valve leaflets are not well visualized. There is no evidence of paravalvular regurgitation. There is no evidence of transvalvular regurgitation. The gradient recorded across the prosthetic aortic valve is within the expected range. The aortic valve peak velocity is 1.94 m/s. AV mean gradient is 8 mmHg. DVI is 0.35.   Mitral Valve There is mild regurgitation. There is no evidence of stenosis.   Tricuspid Valve There is mild regurgitation. There is no evidence of stenosis. The estimated right ventricular systolic pressure is 43.00 mmHg.   Pulmonic Valve There is no evidence of regurgitation. There is no evidence of stenosis.   Ascending Aorta The aortic root is normal in size.   IVC/SVC The right atrial pressure is estimated at 8.0 mmHg. The inferior vena cava is normal in size. Respirophasic changes were blunted (less than 50% variation).   Pericardium There is no pericardial effusion.     Left Ventricle Measurements    Function/Volumes   A4C EF 72 %         LVOT stroke volume 63.15         LVOT stroke volume index 32.2 ml/m2         Left ventricular stroke volume (2D) 54 mL         LVOT Cardiac Output 3.02 l/min         LVOT Cardiac Index 1.52 l/min/m2         Dimensions   LVIDd 4 cm         LVIDS 2.2 cm         IVSd 1 cm         LVPWd 0.9 cm         LVOT area 3.8 cm2         FS 45         Diastolic Filling   MV  E' Tissue Velocity Septal 10 cm/s         LA Volume Index (BP) 24.1 mL/m2         E/A ratio 1.37         E wave deceleration time 183 ms         MV Peak E Black 100 cm/s         MV Peak A Black 0.73 m/s          Report Measurements   AV LVOT peak gradient 2 mmHg              Interventricular Septum Measurements    Shunt Ratio   LVOT peak VTI 16.62 cm         LVOT peak black 0.72 m/s              Right Ventricle Measurements    Dimensions   RVID d 3.4 cm         Tricuspid annular plane systolic excursion 2.9 cm               Left Atrium Measurements    Dimensions   LA size 4 cm         LA length (A2C) 5.3 cm         Volumes   LA volume (BP) 48 mL         LA Volume Index (BP) 24.1 mL/m2               Right Atrium Measurements    Dimensions   RAA A4C 19.1 cm2               Atrial Septum Measurements    Shunt Ratio   LVOT peak VTI 16.62 cm         LVOT peak black 0.72 m/s               Aortic Valve Measurements    Stenosis   Aortic valve peak velocity 1.94 m/s         LVOT peak black 0.72 m/s         Ao VTI 48.14 cm         LVOT peak VTI 16.62 cm         AV mean gradient 8 mmHg         LVOT mn grad 1 mmHg         AV peak gradient 15 mmHg         AV LVOT peak gradient 2 mmHg         Area/Dimensions   DVI 0.35         AV valve area 1.31 cm2         AV area by cont VTI 1.3 cm2         AV area peak black 1.4 cm2         LVOT diameter 2.2 cm         LVOT area 3.8 cm2               Mitral Valve Measurements    Stenosis   MV VTI RETROGRADE 256.5 cm         MV stenosis pressure 1/2 time 53 ms         MV valve area p 1/2 method 4.15         MV mean gradient retrograde 106 mmHg         MR  mmHg               Tricuspid Valve Measurements    RVSP Parameters   TR Peak Black 3 m/s         Est. RA pres 8 mmHg         Triscuspid Valve Regurgitation Peak Gradient 35 mmHg         Right Ventricular Peak Systolic Pressure 43 mmHg         TR MAX PG ANTEGRADE 31 mmHg         Stenosis   TV mean gradient 21 mmHg         TV VTI 96.72 cm                Aorta Measurements    Aortic Dimensions   Ao root 2.8 cm         Asc Ao 3.5 cm               IVC/SVC Measurements    IVC/SVC   Est. RA pres 8 mmHg           [id number]    SB    Results Review Statement: I personally reviewed the following image studies in PACS and associated radiology reports: Echocardiogram. My interpretation of the radiology images/reports is: normally functioning TAVR.    TAVR evaluation Assessment:     [facility]: I    [id number] completed     Assessment:     Aortic stenosis, Non-Rheumatic.   S/P transcatheter aortic valve replacement;      Song Camargo returns to our office today for 1 year routine follow up s/p  transcatheter aortic valve replacement .  Their NYHA functional status is class I.   Recent echocardiogram demonstrates normally functioning TAVR.   ECG with SB; CBC and BMP are pending.     Plan:     I reviewed test results with patient.  I reviewed medications and made no changes. Continue Aspirin 81 mg daily, lifelong, for antiplatelet therapy for bioprosthetic valve.      Song Camargo does not need to return to our office for follow up in the future but will continue to be managed by their primary care physician and cardiologist for ongoing medical care. We recommend yearly echocardiograms which can be ordered and monitored by their cardiologist.  Song Camargo was comfortable with our recommendations and their questions were answered to their satisfaction.    Routine referral to gastroenterology for colonoscopy screening was not indicated, as the patient is over 76 years old    NIR Kaur  25  2:04 PM

## 2025-04-27 DIAGNOSIS — I48.0 PAF (PAROXYSMAL ATRIAL FIBRILLATION) (HCC): ICD-10-CM

## 2025-04-29 RX ORDER — RIVAROXABAN 20 MG/1
20 TABLET, FILM COATED ORAL
Qty: 90 TABLET | Refills: 1 | Status: SHIPPED | OUTPATIENT
Start: 2025-04-29

## 2025-05-05 ENCOUNTER — TELEPHONE (OUTPATIENT)
Age: 80
End: 2025-05-05

## 2025-05-05 NOTE — TELEPHONE ENCOUNTER
Caller: Darian Camargo     Doctor: Dr. Luther     Reason for call: patient stated he had spoken w/ Dr. Luther in the past about possibly getting ablation done. He advised that he thought about it and is interested in getting it done. Unsure if an appt would have to be scheduled first or if procedure could just be scheduled. He is requesting a call back from provider to discuss some questions he has. Please advise    Call back#: [phone]

## 2025-05-06 ENCOUNTER — TELEPHONE (OUTPATIENT)
Dept: NON INVASIVE DIAGNOSTICS | Facility: CLINIC | Age: 80
End: 2025-05-06

## 2025-05-06 ENCOUNTER — PREP FOR PROCEDURE (OUTPATIENT)
Dept: CARDIOLOGY CLINIC | Facility: CLINIC | Age: 80
End: 2025-05-06

## 2025-05-06 ENCOUNTER — TELEPHONE (OUTPATIENT)
Dept: CARDIOLOGY CLINIC | Facility: CLINIC | Age: 80
End: 2025-05-06

## 2025-05-06 DIAGNOSIS — I48.0 PAF (PAROXYSMAL ATRIAL FIBRILLATION) (HCC): Primary | ICD-10-CM

## 2025-05-06 DIAGNOSIS — I48.92 PAROXYSMAL ATRIAL FLUTTER (HCC): ICD-10-CM

## 2025-05-06 NOTE — TELEPHONE ENCOUNTER
"Patient scheduled for TEQUILA / AFIB PFA / AFLUTTER Ablation on 5/15/25 at [facility]  with Dr. Luther.      Instructions sent to patient through [employer].      Patient aware of all general instructions.    Medication holds:   Lisinopril - Do not take day before and morning of procedure.     Blood Thinners:  Eliquis - Do not take morning of procedure.     Labs to be done on 5/7/25:  CMP / CBC (FASTING 8 HOURS)    TEQUILA ordered/completed.    Thank you,  Alesia \"Era\" Luis    "

## 2025-05-06 NOTE — TELEPHONE ENCOUNTER
Patient called with questions regarding ablation. I was able to address some questions but these are the ones still outstanding:    Potential to cause burning to the alimentary canal; is this a complication that we are seeing, if so what is the outcome for our patients?    Is he a candidate for a PPM?    Is he a candidate for a watchman?    He said we can send response back via [employer] if you do not have time to talk.    He would like to proceed with scheduling the ablation.

## 2025-05-06 NOTE — TELEPHONE ENCOUNTER
----- Message from Erick Luther MD sent at 5/6/2025 10:25 AM EDT -----  Please schedule Mr. Camargo for an afib/aflutter ablation.    PFA  CARTO  TEQUILA    Hold anticoagulation morning of procedure.

## 2025-05-06 NOTE — TELEPHONE ENCOUNTER
Attempted to call patient. Went to voice mail.  If he wants to schedule the ablation we can do so without another appointment.

## 2025-05-07 ENCOUNTER — APPOINTMENT (OUTPATIENT)
Dept: LAB | Facility: CLINIC | Age: 80
End: 2025-05-07
Payer: COMMERCIAL

## 2025-05-07 LAB
ALBUMIN SERPL BCG-MCNC: 4.1 G/DL (ref 3.5–5)
ALP SERPL-CCNC: 52 U/L (ref 34–104)
ALT SERPL W P-5'-P-CCNC: 16 U/L (ref 7–52)
ANION GAP SERPL CALCULATED.3IONS-SCNC: 7 MMOL/L (ref 4–13)
AST SERPL W P-5'-P-CCNC: 19 U/L (ref 13–39)
BASOPHILS # BLD AUTO: 0.04 THOUSANDS/ÂΜL (ref 0–0.1)
BASOPHILS NFR BLD AUTO: 1 % (ref 0–1)
BILIRUB SERPL-MCNC: 0.73 MG/DL (ref 0.2–1)
BUN SERPL-MCNC: 11 MG/DL (ref 5–25)
CALCIUM SERPL-MCNC: 9 MG/DL (ref 8.4–10.2)
CHLORIDE SERPL-SCNC: 108 MMOL/L (ref 96–108)
CO2 SERPL-SCNC: 30 MMOL/L (ref 21–32)
CREAT SERPL-MCNC: 0.78 MG/DL (ref 0.6–1.3)
EOSINOPHIL # BLD AUTO: 0.15 THOUSAND/ÂΜL (ref 0–0.61)
EOSINOPHIL NFR BLD AUTO: 2 % (ref 0–6)
ERYTHROCYTE [DISTWIDTH] IN BLOOD BY AUTOMATED COUNT: 12.8 % (ref 11.6–15.1)
GFR SERPL CREATININE-BSD FRML MDRD: 86 ML/MIN/1.73SQ M
GLUCOSE P FAST SERPL-MCNC: 82 MG/DL (ref 65–99)
HCT VFR BLD AUTO: 46.8 % (ref 36.5–49.3)
HGB BLD-MCNC: 14.9 G/DL (ref 12–17)
IMM GRANULOCYTES # BLD AUTO: 0.02 THOUSAND/UL (ref 0–0.2)
IMM GRANULOCYTES NFR BLD AUTO: 0 % (ref 0–2)
LYMPHOCYTES # BLD AUTO: 2.23 THOUSANDS/ÂΜL (ref 0.6–4.47)
LYMPHOCYTES NFR BLD AUTO: 34 % (ref 14–44)
MCH RBC QN AUTO: 31.1 PG (ref 26.8–34.3)
MCHC RBC AUTO-ENTMCNC: 31.8 G/DL (ref 31.4–37.4)
MCV RBC AUTO: 98 FL (ref 82–98)
MONOCYTES # BLD AUTO: 0.64 THOUSAND/ÂΜL (ref 0.17–1.22)
MONOCYTES NFR BLD AUTO: 10 % (ref 4–12)
NEUTROPHILS # BLD AUTO: 3.4 THOUSANDS/ÂΜL (ref 1.85–7.62)
NEUTS SEG NFR BLD AUTO: 53 % (ref 43–75)
NRBC BLD AUTO-RTO: 0 /100 WBCS
PLATELET # BLD AUTO: 203 THOUSANDS/UL (ref 149–390)
PMV BLD AUTO: 11.3 FL (ref 8.9–12.7)
POTASSIUM SERPL-SCNC: 4.2 MMOL/L (ref 3.5–5.3)
PROT SERPL-MCNC: 6.5 G/DL (ref 6.4–8.4)
RBC # BLD AUTO: 4.79 MILLION/UL (ref 3.88–5.62)
SODIUM SERPL-SCNC: 145 MMOL/L (ref 135–147)
WBC # BLD AUTO: 6.48 THOUSAND/UL (ref 4.31–10.16)

## 2025-05-07 PROCEDURE — 36415 COLL VENOUS BLD VENIPUNCTURE: CPT

## 2025-05-07 PROCEDURE — 80053 COMPREHEN METABOLIC PANEL: CPT

## 2025-05-07 PROCEDURE — 85025 COMPLETE CBC W/AUTO DIFF WBC: CPT

## 2025-05-09 NOTE — TELEPHONE ENCOUNTER
"Called patient and informed I accidentally scheduled him on a day the doctor is not available. Patient verbalized understanding.     CX 5/15/25 TEQUILA/AFIB PFA/AFLUTTER ABL w/ at [facility].     Rescheduled for 6/19/25 TEQUILA/AFIB PFA/AFLUTTER ABL w/ at [facility].     Resent instructions via [employer] to patient.    Re-ordered labs to be done on 6/9/25:  CMP / CBC (FASTING 8 HOURS)    Thanks,  Alesia \"Ear\" Luis     "

## 2025-05-15 ENCOUNTER — APPOINTMENT (OUTPATIENT)
Dept: NON INVASIVE DIAGNOSTICS | Facility: HOSPITAL | Age: 80
End: 2025-05-15
Attending: STUDENT IN AN ORGANIZED HEALTH CARE EDUCATION/TRAINING PROGRAM
Payer: COMMERCIAL

## 2025-05-29 ENCOUNTER — APPOINTMENT (OUTPATIENT)
Dept: LAB | Facility: CLINIC | Age: 80
End: 2025-05-29
Payer: COMMERCIAL

## 2025-05-29 DIAGNOSIS — Z95.2 S/P TAVR (TRANSCATHETER AORTIC VALVE REPLACEMENT): ICD-10-CM

## 2025-05-29 DIAGNOSIS — I35.0 NONRHEUMATIC AORTIC (VALVE) STENOSIS: ICD-10-CM

## 2025-05-29 LAB
ALBUMIN SERPL BCG-MCNC: 4 G/DL (ref 3.5–5)
ALP SERPL-CCNC: 53 U/L (ref 34–104)
ALT SERPL W P-5'-P-CCNC: 11 U/L (ref 7–52)
ANION GAP SERPL CALCULATED.3IONS-SCNC: 8 MMOL/L (ref 4–13)
AST SERPL W P-5'-P-CCNC: 16 U/L (ref 13–39)
BASOPHILS # BLD AUTO: 0.03 THOUSANDS/ÂΜL (ref 0–0.1)
BASOPHILS NFR BLD AUTO: 0 % (ref 0–1)
BILIRUB SERPL-MCNC: 0.82 MG/DL (ref 0.2–1)
BUN SERPL-MCNC: 12 MG/DL (ref 5–25)
CALCIUM SERPL-MCNC: 8.9 MG/DL (ref 8.4–10.2)
CHLORIDE SERPL-SCNC: 106 MMOL/L (ref 96–108)
CO2 SERPL-SCNC: 29 MMOL/L (ref 21–32)
CREAT SERPL-MCNC: 0.73 MG/DL (ref 0.6–1.3)
EOSINOPHIL # BLD AUTO: 0.11 THOUSAND/ÂΜL (ref 0–0.61)
EOSINOPHIL NFR BLD AUTO: 2 % (ref 0–6)
ERYTHROCYTE [DISTWIDTH] IN BLOOD BY AUTOMATED COUNT: 12.8 % (ref 11.6–15.1)
GFR SERPL CREATININE-BSD FRML MDRD: 88 ML/MIN/1.73SQ M
GLUCOSE P FAST SERPL-MCNC: 97 MG/DL (ref 65–99)
HCT VFR BLD AUTO: 45 % (ref 36.5–49.3)
HGB BLD-MCNC: 14.3 G/DL (ref 12–17)
IMM GRANULOCYTES # BLD AUTO: 0.02 THOUSAND/UL (ref 0–0.2)
IMM GRANULOCYTES NFR BLD AUTO: 0 % (ref 0–2)
LYMPHOCYTES # BLD AUTO: 1.91 THOUSANDS/ÂΜL (ref 0.6–4.47)
LYMPHOCYTES NFR BLD AUTO: 28 % (ref 14–44)
MCH RBC QN AUTO: 31.3 PG (ref 26.8–34.3)
MCHC RBC AUTO-ENTMCNC: 31.8 G/DL (ref 31.4–37.4)
MCV RBC AUTO: 99 FL (ref 82–98)
MONOCYTES # BLD AUTO: 0.77 THOUSAND/ÂΜL (ref 0.17–1.22)
MONOCYTES NFR BLD AUTO: 11 % (ref 4–12)
NEUTROPHILS # BLD AUTO: 3.99 THOUSANDS/ÂΜL (ref 1.85–7.62)
NEUTS SEG NFR BLD AUTO: 59 % (ref 43–75)
NRBC BLD AUTO-RTO: 0 /100 WBCS
PLATELET # BLD AUTO: 203 THOUSANDS/UL (ref 149–390)
PMV BLD AUTO: 11.4 FL (ref 8.9–12.7)
POTASSIUM SERPL-SCNC: 4 MMOL/L (ref 3.5–5.3)
PROT SERPL-MCNC: 6.5 G/DL (ref 6.4–8.4)
RBC # BLD AUTO: 4.57 MILLION/UL (ref 3.88–5.62)
SODIUM SERPL-SCNC: 143 MMOL/L (ref 135–147)
WBC # BLD AUTO: 6.83 THOUSAND/UL (ref 4.31–10.16)

## 2025-06-02 ENCOUNTER — APPOINTMENT (OUTPATIENT)
Dept: RADIOLOGY | Facility: CLINIC | Age: 80
End: 2025-06-02
Attending: PHYSICIAN ASSISTANT
Payer: COMMERCIAL

## 2025-06-02 ENCOUNTER — OFFICE VISIT (OUTPATIENT)
Dept: URGENT CARE | Facility: CLINIC | Age: 80
End: 2025-06-02
Payer: COMMERCIAL

## 2025-06-02 VITALS
TEMPERATURE: 97.3 F | HEART RATE: 58 BPM | HEIGHT: 70 IN | DIASTOLIC BLOOD PRESSURE: 78 MMHG | OXYGEN SATURATION: 98 % | BODY MASS INDEX: 25.77 KG/M2 | WEIGHT: 180 LBS | SYSTOLIC BLOOD PRESSURE: 110 MMHG | RESPIRATION RATE: 16 BRPM

## 2025-06-02 DIAGNOSIS — S49.91XA INJURY OF RIGHT SHOULDER, INITIAL ENCOUNTER: ICD-10-CM

## 2025-06-02 DIAGNOSIS — S46.911A SHOULDER STRAIN, RIGHT, INITIAL ENCOUNTER: Primary | ICD-10-CM

## 2025-06-02 PROCEDURE — 99213 OFFICE O/P EST LOW 20 MIN: CPT | Performed by: PHYSICIAN ASSISTANT

## 2025-06-02 PROCEDURE — 73030 X-RAY EXAM OF SHOULDER: CPT

## 2025-06-02 RX ORDER — LIDOCAINE 50 MG/G
1 PATCH TOPICAL DAILY
Qty: 15 PATCH | Refills: 0 | Status: SHIPPED | OUTPATIENT
Start: 2025-06-02

## 2025-06-02 NOTE — PROGRESS NOTES
"[facility]        NAME: Song Camargo is a 79 y.o. male  : 1945    MRN: [id number]  DATE: 2025  TIME: 11:33 AM    /78 (BP Location: Left arm, Patient Position: Sitting, Cuff Size: Standard)   Pulse 58   Temp (!) 97.3 °F (36.3 °C) (Tympanic)   Resp 16   Ht 5' 10\" (1.778 m)   Wt 81.6 kg (180 lb)   SpO2 98%   BMI 25.83 kg/m²     Assessment and Plan   Shoulder strain, right, initial encounter [S46.914Y]  1. Shoulder strain, right, initial encounter  XR shoulder 2+ vw right    Ambulatory referral to Orthopedic Surgery    lidocaine (Lidoderm) 5 %            Patient Instructions       Follow up with PCP in 3-5 days.  Proceed to  ER if symptoms worsen.    Chief Complaint     Chief Complaint   Patient presents with    Fall     Pt reports fall two days ago after a trip over the dog in the kitchen hallway resulting in right shoulder injury. Pt reports right shoulder made impact against a door jam. Unsure of any swelling or bruising. Hx of fx with surgical repair with hardware.          History of Present Illness       Pt fell with right shoulder into doorway, pt still with right shoulder pain    Fall        Review of Systems   Review of Systems   Constitutional: Negative.    HENT: Negative.     Eyes: Negative.    Respiratory: Negative.     Cardiovascular: Negative.    Gastrointestinal: Negative.    Endocrine: Negative.    Genitourinary: Negative.    Musculoskeletal: Negative.    Skin: Negative.    Allergic/Immunologic: Negative.    Neurological: Negative.    Hematological: Negative.    Psychiatric/Behavioral: Negative.     All other systems reviewed and are negative.        Current Medications     Current Medications[1]    Current Allergies     Allergies as of 2025    (No Known Allergies)            The following portions of the patient's history were reviewed and updated as appropriate: allergies, current medications, past family history, past medical history, past social history, " "past surgical history and problem list.     Past Medical History[2]    Past Surgical History[3]    Family History[4]      Medications have been verified.        Objective   /78 (BP Location: Left arm, Patient Position: Sitting, Cuff Size: Standard)   Pulse 58   Temp (!) 97.3 °F (36.3 °C) (Tympanic)   Resp 16   Ht 5' 10\" (1.778 m)   Wt 81.6 kg (180 lb)   SpO2 98%   BMI 25.83 kg/m²        Physical Exam     Physical Exam  Vitals and nursing note reviewed.   Constitutional:       Appearance: Normal appearance. He is normal weight.      Comments: Pt declines sling   HENT:      Head: Normocephalic and atraumatic.     Cardiovascular:      Rate and Rhythm: Normal rate and regular rhythm.      Pulses: Normal pulses.      Heart sounds: Normal heart sounds.   Pulmonary:      Effort: Pulmonary effort is normal.      Breath sounds: Normal breath sounds.   Abdominal:      Palpations: Abdomen is soft.     Musculoskeletal:         General: Normal range of motion.      Cervical back: Normal range of motion and neck supple.      Comments: Right shoulder from ,   ac joint pain  no ecchymosis  no humeral head pain distal neuro and vascular wnl   No rib pain no scapular pain        Skin:     General: Skin is warm.     Neurological:      Mental Status: He is alert and oriented to person, place, and time.                          [1]   Current Outpatient Medications:     acetaminophen (TYLENOL) 325 mg tablet, Take 2 tablets (650 mg total) by mouth every 4 (four) hours as needed for fever, mild pain, moderate pain or headaches (temperature greater than 101 F), Disp: , Rfl:     aspirin 81 mg chewable tablet, Chew 1 tablet (81 mg total) daily, Disp: , Rfl:     atorvastatin (LIPITOR) 40 mg tablet, Take 1 tablet (40 mg total) by mouth daily, Disp: 90 tablet, Rfl: 3    flecainide (TAMBOCOR) 100 mg tablet, Take 1 tablet (100 mg total) by mouth 2 (two) times a day, Disp: 180 tablet, Rfl: 1    lidocaine (Lidoderm) 5 %, Apply 1 patch " topically daily over 12 hours Remove & Discard patch within 12 hours or as directed by MD, Disp: 15 patch, Rfl: 0    lisinopril (ZESTRIL) 10 mg tablet, Take 1 tablet (10 mg total) by mouth daily, Disp: 90 tablet, Rfl: 1    metoprolol tartrate (LOPRESSOR) 25 mg tablet, Take 0.5 tablets (12.5 mg total) by mouth every 12 (twelve) hours, Disp: 90 tablet, Rfl: 1    rivaroxaban (Xarelto) 20 mg tablet, TAKE 1 TABLET BY MOUTH DAILY  WITH BREAKFAST, Disp: 90 tablet, Rfl: 1    polyethylene glycol (MIRALAX) 17 g packet, Take 17 g by mouth daily (Patient taking differently: Take 17 g by mouth if needed), Disp: 510 g, Rfl: 0  [2]   Past Medical History:  Diagnosis Date    Arthritis     Basal cell carcinoma (BCC)     Coronary artery disease     High blood pressure     High cholesterol     Lumbar radiculopathy 4/15/2024    Prostate CA (HCC)    [3]   Past Surgical History:  Procedure Laterality Date    APPENDECTOMY      CARDIAC CATHETERIZATION N/A 3/13/2024    Procedure: Cardiac RHC/LHC;  Surgeon: Mir Sharpe MD;  [facility];  Service: Cardiology    CARDIAC CATHETERIZATION N/A 4/16/2024    Procedure: Cardiac tavr;  Surgeon: Mir Sharpe MD;  Location[facility];  Service: Cardiology    COLONOSCOPY  2014    MOHS SURGERY  2004    WA REPLACE AORTIC VALVE OPENFEMORAL ARTERY APPROACH N/A 4/16/2024    Procedure: REPLACEMENT AORTIC VALVE TRANSCATHETER (TAVR) TRANSFEMORAL W/ 23MM OLIVEIRA REGINE S3 UR VALVE(ACCESS ON LEFT) TEQUILA;  Surgeon: Jacob Macias DO;  Location: [facility];  Service: Cardiac Surgery    PROSTATECTOMY  2010    SHOULDER SURGERY Right     TONSILLECTOMY      VASECTOMY     [4]   Family History  Problem Relation Name Age of Onset    Sudden death Mother          cardiac    Hypertension Father      Coronary artery disease Father      Cancer Father      Hyperlipidemia Father

## 2025-06-17 DIAGNOSIS — I48.0 PAROXYSMAL ATRIAL FIBRILLATION (HCC): ICD-10-CM

## 2025-06-17 DIAGNOSIS — I48.92 PAROXYSMAL ATRIAL FLUTTER (HCC): ICD-10-CM

## 2025-06-18 ENCOUNTER — ANESTHESIA EVENT (OUTPATIENT)
Dept: NON INVASIVE DIAGNOSTICS | Facility: HOSPITAL | Age: 80
End: 2025-06-18
Payer: COMMERCIAL

## 2025-06-18 RX ORDER — FLECAINIDE ACETATE 100 MG/1
100 TABLET ORAL 2 TIMES DAILY
Qty: 180 TABLET | Refills: 1 | Status: ON HOLD | OUTPATIENT
Start: 2025-06-18 | End: 2025-06-20

## 2025-06-18 NOTE — ANESTHESIA PREPROCEDURE EVALUATION
Procedure:  PRE ABLATION TEQUILA  Cardiac eps/afib ablation PFA (Chest)  Cardiac eps/aflutter ablation (Chest)    Echo 4/2025:    Left Ventricle: Left ventricular cavity size is normal. Wall thickness is normal. The left ventricular ejection fraction is 55-60%. Systolic function is normal. Wall motion is normal. Diastolic function is normal for age.    Right Ventricle: Right ventricular cavity size is normal. Systolic function is normal.    Right Atrium: The atrium is mildly dilated.    Aortic Valve: There is an Wu REGINE 3 Ultra Resilia 23 mm TAVR bioprosthetic valve. The prosthetic valve appears well-seated and appears to be functioning normally. Prosthetic valve leaflets are not well visualized. There is no evidence of paravalvular regurgitation. There is no evidence of transvalvular regurgitation. The gradient recorded across the prosthetic aortic valve is within the expected range. The aortic valve peak velocity is 1.94 m/s. AV mean gradient is 8 mmHg. DVI is 0.35.    Mitral Valve: There is mild regurgitation.    Tricuspid Valve: There is mild regurgitation.    Aorta: The aortic root is normal in size.    EKG 2/2025:  Sinus bradycardia   Nonspecific ST abnormality   QTc 416 ms     Relevant Problems   CARDIO   (+) Aortic valve stenosis   (+) Benign essential hypertension   (+) Coronary artery disease involving native coronary artery   (+) Mixed hyperlipidemia   (+) Nonrheumatic aortic valve stenosis   (+) Peripheral vascular disease, unspecified (HCC)      Meds  ASA today  Flecainide today  Metop today  Xarelto yesterday    METS  Walks 2 blocks at least daily walking the dog    NPO?:  Food last night  Sip with meds    Reports BP at home is usually 130s-140s    Physical Exam    Airway     Mallampati score: II  TM Distance: >3 FB  Neck ROM: limited extension      Cardiovascular  Rate: normal    Dental   Comment: Lower incisors uneven  Nothing loose  , No notable dental hx     Pulmonary  Pulmonary exam normal      Neurological    He appears awake and alert.      Other Findings        Anesthesia Plan  ASA Score- 3     Anesthesia Type- general with ASA Monitors.         Additional Monitors:     Airway Plan: Oral ETT.           Plan Factors-Exercise tolerance (METS): >4 METS.    Chart reviewed. EKG reviewed. Imaging results reviewed.  Patient summary reviewed.                  Induction- intravenous.    Postoperative Plan- .   Monitoring Plan - Monitoring plan - standard ASA monitoring  Post Operative Pain Plan - non-opiod analgesics and multimodal analgesia        Informed Consent- Anesthetic plan and risks discussed with patient.  I personally reviewed this patient with the CRNA. Discussed and agreed on the Anesthesia Plan with the CRNA..      NPO Status:  No vitals data found for the desired time range.

## 2025-06-19 ENCOUNTER — HOSPITAL ENCOUNTER (OUTPATIENT)
Facility: HOSPITAL | Age: 80
Setting detail: OUTPATIENT SURGERY
Discharge: HOME | End: 2025-06-20
Attending: STUDENT IN AN ORGANIZED HEALTH CARE EDUCATION/TRAINING PROGRAM | Admitting: STUDENT IN AN ORGANIZED HEALTH CARE EDUCATION/TRAINING PROGRAM
Payer: COMMERCIAL

## 2025-06-19 ENCOUNTER — ANESTHESIA (OUTPATIENT)
Dept: NON INVASIVE DIAGNOSTICS | Facility: HOSPITAL | Age: 80
End: 2025-06-19
Payer: COMMERCIAL

## 2025-06-19 ENCOUNTER — APPOINTMENT (OUTPATIENT)
Dept: NON INVASIVE DIAGNOSTICS | Facility: HOSPITAL | Age: 80
End: 2025-06-19
Attending: STUDENT IN AN ORGANIZED HEALTH CARE EDUCATION/TRAINING PROGRAM
Payer: COMMERCIAL

## 2025-06-19 DIAGNOSIS — I48.0 PAF (PAROXYSMAL ATRIAL FIBRILLATION) (HCC): ICD-10-CM

## 2025-06-19 DIAGNOSIS — I48.92 PAROXYSMAL ATRIAL FLUTTER (HCC): ICD-10-CM

## 2025-06-19 DIAGNOSIS — I48.0 PAROXYSMAL ATRIAL FIBRILLATION (HCC): ICD-10-CM

## 2025-06-19 LAB
ANION GAP SERPL CALCULATED.3IONS-SCNC: 3 MMOL/L (ref 4–13)
BASOPHILS # BLD AUTO: 0.04 THOUSANDS/ÂΜL (ref 0–0.1)
BASOPHILS NFR BLD AUTO: 1 % (ref 0–1)
BUN SERPL-MCNC: 12 MG/DL (ref 5–25)
CALCIUM SERPL-MCNC: 9.3 MG/DL (ref 8.4–10.2)
CHLORIDE SERPL-SCNC: 108 MMOL/L (ref 96–108)
CO2 SERPL-SCNC: 34 MMOL/L (ref 21–32)
CREAT SERPL-MCNC: 0.74 MG/DL (ref 0.6–1.3)
EOSINOPHIL # BLD AUTO: 0.16 THOUSAND/ÂΜL (ref 0–0.61)
EOSINOPHIL NFR BLD AUTO: 2 % (ref 0–6)
ERYTHROCYTE [DISTWIDTH] IN BLOOD BY AUTOMATED COUNT: 13.5 % (ref 11.6–15.1)
GFR SERPL CREATININE-BSD FRML MDRD: 87 ML/MIN/1.73SQ M
GLUCOSE P FAST SERPL-MCNC: 96 MG/DL (ref 65–99)
GLUCOSE SERPL-MCNC: 96 MG/DL (ref 65–140)
HCT VFR BLD AUTO: 48.4 % (ref 36.5–49.3)
HGB BLD-MCNC: 16.2 G/DL (ref 12–17)
IMM GRANULOCYTES # BLD AUTO: 0.03 THOUSAND/UL (ref 0–0.2)
IMM GRANULOCYTES NFR BLD AUTO: 0 % (ref 0–2)
INR PPP: 1.27 (ref 0.85–1.19)
KCT BLD-ACNC: 306 SEC (ref 89–137)
KCT BLD-ACNC: 383 SEC (ref 89–137)
KCT BLD-ACNC: 396 SEC (ref 89–137)
KCT BLD-ACNC: 408 SEC (ref 89–137)
LYMPHOCYTES # BLD AUTO: 2.69 THOUSANDS/ÂΜL (ref 0.6–4.47)
LYMPHOCYTES NFR BLD AUTO: 37 % (ref 14–44)
MAGNESIUM SERPL-MCNC: 2.2 MG/DL (ref 1.9–2.7)
MCH RBC QN AUTO: 32.3 PG (ref 26.8–34.3)
MCHC RBC AUTO-ENTMCNC: 33.5 G/DL (ref 31.4–37.4)
MCV RBC AUTO: 97 FL (ref 82–98)
MONOCYTES # BLD AUTO: 0.81 THOUSAND/ÂΜL (ref 0.17–1.22)
MONOCYTES NFR BLD AUTO: 11 % (ref 4–12)
NEUTROPHILS # BLD AUTO: 3.59 THOUSANDS/ÂΜL (ref 1.85–7.62)
NEUTS SEG NFR BLD AUTO: 49 % (ref 43–75)
NRBC BLD AUTO-RTO: 0 /100 WBCS
PLATELET # BLD AUTO: 240 THOUSANDS/UL (ref 149–390)
PMV BLD AUTO: 10.4 FL (ref 8.9–12.7)
POTASSIUM SERPL-SCNC: 4 MMOL/L (ref 3.5–5.3)
PROTHROMBIN TIME: 16.1 SECONDS (ref 12.3–15)
RBC # BLD AUTO: 5.01 MILLION/UL (ref 3.88–5.62)
SL CV LV EF: 55
SODIUM SERPL-SCNC: 145 MMOL/L (ref 135–147)
SPECIMEN SOURCE: ABNORMAL
WBC # BLD AUTO: 7.32 THOUSAND/UL (ref 4.31–10.16)

## 2025-06-19 PROCEDURE — 93656 COMPRE EP EVAL ABLTJ ATR FIB: CPT | Performed by: STUDENT IN AN ORGANIZED HEALTH CARE EDUCATION/TRAINING PROGRAM

## 2025-06-19 PROCEDURE — 93005 ELECTROCARDIOGRAM TRACING: CPT

## 2025-06-19 PROCEDURE — 93325 DOPPLER ECHO COLOR FLOW MAPG: CPT | Performed by: STUDENT IN AN ORGANIZED HEALTH CARE EDUCATION/TRAINING PROGRAM

## 2025-06-19 PROCEDURE — C1894 INTRO/SHEATH, NON-LASER: HCPCS | Performed by: STUDENT IN AN ORGANIZED HEALTH CARE EDUCATION/TRAINING PROGRAM

## 2025-06-19 PROCEDURE — 93320 DOPPLER ECHO COMPLETE: CPT | Performed by: STUDENT IN AN ORGANIZED HEALTH CARE EDUCATION/TRAINING PROGRAM

## 2025-06-19 PROCEDURE — 85347 COAGULATION TIME ACTIVATED: CPT

## 2025-06-19 PROCEDURE — C1730 CATH, EP, 19 OR FEW ELECT: HCPCS | Performed by: STUDENT IN AN ORGANIZED HEALTH CARE EDUCATION/TRAINING PROGRAM

## 2025-06-19 PROCEDURE — 93312 ECHO TRANSESOPHAGEAL: CPT | Performed by: STUDENT IN AN ORGANIZED HEALTH CARE EDUCATION/TRAINING PROGRAM

## 2025-06-19 PROCEDURE — 76937 US GUIDE VASCULAR ACCESS: CPT | Performed by: STUDENT IN AN ORGANIZED HEALTH CARE EDUCATION/TRAINING PROGRAM

## 2025-06-19 PROCEDURE — C1760 CLOSURE DEV, VASC: HCPCS | Performed by: STUDENT IN AN ORGANIZED HEALTH CARE EDUCATION/TRAINING PROGRAM

## 2025-06-19 PROCEDURE — 85025 COMPLETE CBC W/AUTO DIFF WBC: CPT | Performed by: PHYSICIAN ASSISTANT

## 2025-06-19 PROCEDURE — NC001 PR NO CHARGE: Performed by: STUDENT IN AN ORGANIZED HEALTH CARE EDUCATION/TRAINING PROGRAM

## 2025-06-19 PROCEDURE — 93312 ECHO TRANSESOPHAGEAL: CPT

## 2025-06-19 PROCEDURE — C1759 CATH, INTRA ECHOCARDIOGRAPHY: HCPCS | Performed by: STUDENT IN AN ORGANIZED HEALTH CARE EDUCATION/TRAINING PROGRAM

## 2025-06-19 PROCEDURE — 83735 ASSAY OF MAGNESIUM: CPT | Performed by: PHYSICIAN ASSISTANT

## 2025-06-19 PROCEDURE — C1769 GUIDE WIRE: HCPCS | Performed by: STUDENT IN AN ORGANIZED HEALTH CARE EDUCATION/TRAINING PROGRAM

## 2025-06-19 PROCEDURE — C1733 CATH, EP, OTHR THAN COOL-TIP: HCPCS | Performed by: STUDENT IN AN ORGANIZED HEALTH CARE EDUCATION/TRAINING PROGRAM

## 2025-06-19 PROCEDURE — NC001 PR NO CHARGE: Performed by: PHYSICIAN ASSISTANT

## 2025-06-19 PROCEDURE — 80048 BASIC METABOLIC PNL TOTAL CA: CPT | Performed by: PHYSICIAN ASSISTANT

## 2025-06-19 PROCEDURE — C1766 INTRO/SHEATH,STRBLE,NON-PEEL: HCPCS | Performed by: STUDENT IN AN ORGANIZED HEALTH CARE EDUCATION/TRAINING PROGRAM

## 2025-06-19 PROCEDURE — 85610 PROTHROMBIN TIME: CPT | Performed by: PHYSICIAN ASSISTANT

## 2025-06-19 PROCEDURE — C1732 CATH, EP, DIAG/ABL, 3D/VECT: HCPCS | Performed by: STUDENT IN AN ORGANIZED HEALTH CARE EDUCATION/TRAINING PROGRAM

## 2025-06-19 DEVICE — PERCLOSE™ PROSTYLE™ SUTURE-MEDIATED CLOSURE AND REPAIR SYSTEM
Type: IMPLANTABLE DEVICE | Site: GROIN | Status: FUNCTIONAL
Brand: PERCLOSE™ PROSTYLE™

## 2025-06-19 DEVICE — THE VASCADE VCS IS INTENDED TO SEAL FEMORAL VESSEL PUNCTURE SITES AT THE COMPLETION OF CATHETER-BASED PROCEDURES.  THE SYSTEM IS DESIGNED TO DELIVER A RESORBABLE COLLAGEN PATCH, EXTRA-VASCULARLY, AT THE VESSEL PUNCTURE SITE TO ACHIEVE HEMOSTASIS.   FOR USE IN 6F & 7F INTRODUCER SHEATHS; OVERALL LENGTH OF THE SHEATH (INCLUDING THE HUB) NEEDS TO BE LESS THAN 15CM.
Type: IMPLANTABLE DEVICE | Site: GROIN | Status: FUNCTIONAL
Brand: CARDIVA VASCADE 6/7F VCS

## 2025-06-19 RX ORDER — PROTAMINE SULFATE 10 MG/ML
INJECTION, SOLUTION INTRAVENOUS AS NEEDED
Status: DISCONTINUED | OUTPATIENT
Start: 2025-06-19 | End: 2025-06-19

## 2025-06-19 RX ORDER — FLECAINIDE ACETATE 50 MG/1
50 TABLET ORAL 2 TIMES DAILY
Status: DISCONTINUED | OUTPATIENT
Start: 2025-06-19 | End: 2025-06-20 | Stop reason: HOSPADM

## 2025-06-19 RX ORDER — SODIUM CHLORIDE 9 MG/ML
20 INJECTION, SOLUTION INTRAVENOUS ONCE
Status: COMPLETED | OUTPATIENT
Start: 2025-06-19 | End: 2025-06-19

## 2025-06-19 RX ORDER — GLYCOPYRROLATE 0.2 MG/ML
INJECTION INTRAMUSCULAR; INTRAVENOUS AS NEEDED
Status: DISCONTINUED | OUTPATIENT
Start: 2025-06-19 | End: 2025-06-19

## 2025-06-19 RX ORDER — CEFAZOLIN SODIUM 2 G/50ML
SOLUTION INTRAVENOUS AS NEEDED
Status: DISCONTINUED | OUTPATIENT
Start: 2025-06-19 | End: 2025-06-19

## 2025-06-19 RX ORDER — ROCURONIUM BROMIDE 10 MG/ML
INJECTION, SOLUTION INTRAVENOUS AS NEEDED
Status: DISCONTINUED | OUTPATIENT
Start: 2025-06-19 | End: 2025-06-19

## 2025-06-19 RX ORDER — ONDANSETRON 2 MG/ML
4 INJECTION INTRAMUSCULAR; INTRAVENOUS ONCE AS NEEDED
Status: CANCELLED | OUTPATIENT
Start: 2025-06-19

## 2025-06-19 RX ORDER — LIDOCAINE HYDROCHLORIDE 10 MG/ML
INJECTION, SOLUTION EPIDURAL; INFILTRATION; INTRACAUDAL; PERINEURAL AS NEEDED
Status: DISCONTINUED | OUTPATIENT
Start: 2025-06-19 | End: 2025-06-19

## 2025-06-19 RX ORDER — POLYETHYLENE GLYCOL 3350 17 G/17G
17 POWDER, FOR SOLUTION ORAL DAILY PRN
Status: DISCONTINUED | OUTPATIENT
Start: 2025-06-19 | End: 2025-06-20 | Stop reason: HOSPADM

## 2025-06-19 RX ORDER — ACETAMINOPHEN 325 MG/1
650 TABLET ORAL EVERY 4 HOURS PRN
Status: DISCONTINUED | OUTPATIENT
Start: 2025-06-19 | End: 2025-06-19

## 2025-06-19 RX ORDER — HEPARIN SODIUM 1000 [USP'U]/ML
INJECTION, SOLUTION INTRAVENOUS; SUBCUTANEOUS CODE/TRAUMA/SEDATION MEDICATION
Status: DISCONTINUED | OUTPATIENT
Start: 2025-06-19 | End: 2025-06-19 | Stop reason: HOSPADM

## 2025-06-19 RX ORDER — ASPIRIN 81 MG/1
81 TABLET, CHEWABLE ORAL DAILY
Status: DISCONTINUED | OUTPATIENT
Start: 2025-06-20 | End: 2025-06-20 | Stop reason: HOSPADM

## 2025-06-19 RX ORDER — ALBUTEROL SULFATE 0.83 MG/ML
2.5 SOLUTION RESPIRATORY (INHALATION) ONCE AS NEEDED
Status: CANCELLED | OUTPATIENT
Start: 2025-06-19

## 2025-06-19 RX ORDER — LISINOPRIL 10 MG/1
10 TABLET ORAL DAILY
Status: DISCONTINUED | OUTPATIENT
Start: 2025-06-19 | End: 2025-06-20 | Stop reason: HOSPADM

## 2025-06-19 RX ORDER — ATORVASTATIN CALCIUM 40 MG/1
40 TABLET, FILM COATED ORAL DAILY
Status: DISCONTINUED | OUTPATIENT
Start: 2025-06-20 | End: 2025-06-20 | Stop reason: HOSPADM

## 2025-06-19 RX ORDER — FENTANYL CITRATE/PF 50 MCG/ML
25 SYRINGE (ML) INJECTION
Refills: 0 | Status: CANCELLED | OUTPATIENT
Start: 2025-06-19

## 2025-06-19 RX ORDER — LIDOCAINE HYDROCHLORIDE 10 MG/ML
INJECTION, SOLUTION EPIDURAL; INFILTRATION; INTRACAUDAL; PERINEURAL CODE/TRAUMA/SEDATION MEDICATION
Status: DISCONTINUED | OUTPATIENT
Start: 2025-06-19 | End: 2025-06-19 | Stop reason: HOSPADM

## 2025-06-19 RX ORDER — HEPARIN SODIUM 10000 [USP'U]/100ML
INJECTION, SOLUTION INTRAVENOUS
Status: COMPLETED | OUTPATIENT
Start: 2025-06-19 | End: 2025-06-19

## 2025-06-19 RX ORDER — LIDOCAINE 50 MG/G
1 PATCH TOPICAL DAILY
Status: DISCONTINUED | OUTPATIENT
Start: 2025-06-19 | End: 2025-06-20 | Stop reason: HOSPADM

## 2025-06-19 RX ORDER — ONDANSETRON 2 MG/ML
INJECTION INTRAMUSCULAR; INTRAVENOUS AS NEEDED
Status: DISCONTINUED | OUTPATIENT
Start: 2025-06-19 | End: 2025-06-19

## 2025-06-19 RX ORDER — DEXAMETHASONE SODIUM PHOSPHATE 10 MG/ML
INJECTION, SOLUTION INTRAMUSCULAR; INTRAVENOUS AS NEEDED
Status: DISCONTINUED | OUTPATIENT
Start: 2025-06-19 | End: 2025-06-19

## 2025-06-19 RX ORDER — PROPOFOL 10 MG/ML
INJECTION, EMULSION INTRAVENOUS AS NEEDED
Status: DISCONTINUED | OUTPATIENT
Start: 2025-06-19 | End: 2025-06-19

## 2025-06-19 RX ORDER — ACETAMINOPHEN 325 MG/1
650 TABLET ORAL EVERY 4 HOURS PRN
Status: DISCONTINUED | OUTPATIENT
Start: 2025-06-19 | End: 2025-06-20 | Stop reason: HOSPADM

## 2025-06-19 RX ORDER — FENTANYL CITRATE 50 UG/ML
INJECTION, SOLUTION INTRAMUSCULAR; INTRAVENOUS AS NEEDED
Status: DISCONTINUED | OUTPATIENT
Start: 2025-06-19 | End: 2025-06-19

## 2025-06-19 RX ORDER — MIDAZOLAM HYDROCHLORIDE 2 MG/2ML
INJECTION, SOLUTION INTRAMUSCULAR; INTRAVENOUS AS NEEDED
Status: DISCONTINUED | OUTPATIENT
Start: 2025-06-19 | End: 2025-06-19

## 2025-06-19 RX ADMIN — GLYCOPYRROLATE 0.2 MG: 0.2 INJECTION, SOLUTION INTRAMUSCULAR; INTRAVENOUS at 08:36

## 2025-06-19 RX ADMIN — ROCURONIUM BROMIDE 20 MG: 10 INJECTION, SOLUTION INTRAVENOUS at 10:22

## 2025-06-19 RX ADMIN — PHENYLEPHRINE HYDROCHLORIDE 30 MCG/MIN: 50 INJECTION INTRAVENOUS at 08:28

## 2025-06-19 RX ADMIN — LIDOCAINE 1 PATCH: 700 PATCH TOPICAL at 14:06

## 2025-06-19 RX ADMIN — ROCURONIUM BROMIDE 10 MG: 10 INJECTION, SOLUTION INTRAVENOUS at 10:03

## 2025-06-19 RX ADMIN — SODIUM CHLORIDE: 0.9 INJECTION, SOLUTION INTRAVENOUS at 08:06

## 2025-06-19 RX ADMIN — PHENOL 1 SPRAY: 1.4 SPRAY ORAL at 17:47

## 2025-06-19 RX ADMIN — ROCURONIUM BROMIDE 50 MG: 10 INJECTION, SOLUTION INTRAVENOUS at 08:18

## 2025-06-19 RX ADMIN — ROCURONIUM BROMIDE 20 MG: 10 INJECTION, SOLUTION INTRAVENOUS at 09:30

## 2025-06-19 RX ADMIN — PHENOL 1 SPRAY: 1.4 SPRAY ORAL at 20:23

## 2025-06-19 RX ADMIN — PROTAMINE SULFATE 50 MG: 10 INJECTION, SOLUTION INTRAVENOUS at 10:52

## 2025-06-19 RX ADMIN — RIVAROXABAN 20 MG: 20 TABLET, FILM COATED ORAL at 17:17

## 2025-06-19 RX ADMIN — FENTANYL CITRATE 100 MCG: 50 INJECTION INTRAMUSCULAR; INTRAVENOUS at 08:18

## 2025-06-19 RX ADMIN — GLYCOPYRROLATE 0.2 MG: 0.2 INJECTION, SOLUTION INTRAMUSCULAR; INTRAVENOUS at 09:49

## 2025-06-19 RX ADMIN — PROTAMINE SULFATE 30 MG: 10 INJECTION, SOLUTION INTRAVENOUS at 10:57

## 2025-06-19 RX ADMIN — SODIUM CHLORIDE: 0.9 INJECTION, SOLUTION INTRAVENOUS at 10:16

## 2025-06-19 RX ADMIN — ONDANSETRON 4 MG: 2 INJECTION INTRAMUSCULAR; INTRAVENOUS at 10:49

## 2025-06-19 RX ADMIN — DEXAMETHASONE SODIUM PHOSPHATE 10 MG: 10 INJECTION, SOLUTION INTRAMUSCULAR; INTRAVENOUS at 08:29

## 2025-06-19 RX ADMIN — LISINOPRIL 10 MG: 10 TABLET ORAL at 13:52

## 2025-06-19 RX ADMIN — LIDOCAINE HYDROCHLORIDE 50 MG: 10 INJECTION, SOLUTION EPIDURAL; INFILTRATION; INTRACAUDAL; PERINEURAL at 08:18

## 2025-06-19 RX ADMIN — SUGAMMADEX 200 MG: 100 INJECTION, SOLUTION INTRAVENOUS at 11:06

## 2025-06-19 RX ADMIN — PROPOFOL 150 MG: 10 INJECTION, EMULSION INTRAVENOUS at 08:18

## 2025-06-19 RX ADMIN — CEFAZOLIN SODIUM 2000 MG: 2 SOLUTION INTRAVENOUS at 08:28

## 2025-06-19 NOTE — H&P
H&P - Electrophysiology   Name: Song Camargo 80 y.o. male I MRN: [id number]  Unit/Bed#: [facility] CATH LAB ROOM I Date of Admission: 6/19/2025   Date of Service: 6/19/2025 I Hospital Day: 0     Assessment & Plan      Paroxysmal atrial fibrillation  -plan for EPS and ablation      History of Present Illness   Mr. Song Camargo is a 79-year-old gentleman with past medical history of nonobstructive CAD, hypertension, hyperlipidemia, severe AAS status post TAVR, and paroxysmal atrial fibrillation.  With regards to his atrial fibrillation this was found when he was in cardiac rehab.  He has been maintained on Xarelto and metoprolol as well as flecainide 50 mg every 12 hours.  He was recently seen in cardiology clinic where he was found to have episodes of dizziness with heart rates in the 40s.  His metoprolol was decreased at that time.     He underwent ZIO monitor revealing a 14% burden of atrial fibrillation/atrial flutter.  Heart rate variation on that monitor ranged from 32 bpm to a max heart rate is 155 bpm.  He was referred to electrophysiology for further evaluation/management.    In outpatient EP clinic we discussed options for management.  He was noted to be bradycaridc on low dose metoprolol and flecainide limiting uptitration for afib with RVR.  We discussed pacemaker implant to allow for more aggressive rate control or EPS and ablation.  Mr. Camargo noted he would like to proceed with EPS and ablation.  He presents today for that procedure.     On initial evaluation, his blood pressure was noted to be very elevated at 213/94mmHg.  This was a significant change from reported baseline blood pressures at home in the 130s/80s.  Patient completely asymptomatic. Risks of proceeding discussed with anesthesia and the patient. Risks were discussed in detail including but not limited to bleeding, bruising, hematomas, fistulas, retroperitoneal bleeding, abdominal bleeding, cardiac perforation requiring drain or surgical  "repair, life threatening injury to the aorta, injury to the phrenic nerve, injury to the esophagus, stroke, heart attack, death.     After discussions, patient noted that he would like to move forward the the procedure.         Review of Systems  A complete review of systems was performed and was negative in 10/10 systems or as listed above.     Objective :       Physical Exam  General: No acute distress.  Pleasant and appropriate  HEENT: Normocephalic, atraumatic  Cardiovascular: Regular rate and rhythm  Pulmonary: Normal respiratory effort.  No accessory muscle use.  Clear to auscultation bilaterally.  Abdomen: Soft nontender, nondistended  Lower extremities: Warm, well-perfused  Neuro: Moving all 4 extremity spontaneously.  No focal neurodeficits.  Psych: Answers all questions appropriately.  Appropriate affect and mood.        Lab Results: I have reviewed the following results:              No results found for: \"HGBA1C\"  Lab Results   Component Value Date    CKTOTAL 119 04/09/2024       "

## 2025-06-19 NOTE — DISCHARGE SUMMARY
Discharge Summary - Electrophysiology   Name: Song Camargo 80 y.o. male I MRN: [id number]  Unit/Bed#: [facility] CATH LAB ROOM I Date of Admission: 6/19/2025   Date of Service: 6/19/2025 I Hospital Day: 0      Discharge Summary - Song Camargo 80 y.o. male MRN: [id number]    Unit/Bed#: [facility] CATH LAB ROOM Encounter: [id number]      Admission Date: 6/19/2025   Discharge Date:     Discharge Diagnosis:   Paroxysmal A-fib/atrial flutter    Procedures Performed:   6/19/2025 EPS and Afib ablation -pfa pvi, septal anchor placement  Orders Placed This Encounter   Procedures    Cardiac ep lab eps/ablations       Consultants: None    HPI: Please refer to the initial history and physical as well as procedure notes for full details. Briefly, Song Camargo is a 80 y.o. year old male with nonobstructive CAD, HTN, HLD, severe AAS status post TAVR, and PAF  He was seen by Dr. Luther as an outpatient, and A-fib ablation was recommended. He presented this hospital admission to undergo this procedure.     Hospital Course: Song Camargo presented at his [facility] state of health. After the procedure was explained in detail and consent was obtained, he underwent afib ablation without complications. He tolerated the procedure well. He was then monitored overnight for further observation.    There were no acute issues or events overnight. The following morning He denied all cardiac complaints, including chest pain/pressure, dyspnea, palpitations, dizziness, lightheadedness, or syncope. His vital signs were reviewed and labs are stable. Telemetry showed nsr . His groins were soft without significant hematoma or recurrent bleeding. Physical exam on the day of discharge was as follows:  vss  GEN: NAD, alert and oriented, well appearing  SKIN: dry without significant lesions or rashes  HEENT: NCAT, PERRL, EOMs intact  NECK: No JVD or carotid bruits appreciated  CARDIOVASCULAR: RRR, normal S1, S2 without murmurs, rubs, or gallops  appreciated  LUNGS: Clear to auscultation bilaterally without wheezes, rhonchi, or rales  ABDOMEN: Soft, nontender, nondistended  EXTREMITIES/VASCULAR: perfused without clubbing, cyanosis, or edema b/l  PSYCH: Normal mood and affect  NEURO: CN ll-Xll grossly intact    He was given routine post ablation discharge instructions and restrictions, and these were explained in detail. He was given a follow up appointment with Jarad Clarke PA-C, and he was instructed to follow up with his primary cardiologist as previously instructed.    In terms of his medications, he will continue xarelto, metoprolol succinate, and flecainide 50mg q12.    He is stable for discharge at this time with all questions answered. He was discussed in detail with Dr. Luther who is in agreement with this discharge summary.     Discharge Medications:  See after visit summary for reconciled discharge medications provided to patient and family.      Medications Prior to Admission:     acetaminophen (TYLENOL) 325 mg tablet    aspirin 81 mg chewable tablet    atorvastatin (LIPITOR) 40 mg tablet    flecainide (TAMBOCOR) 100 mg tablet    lidocaine (Lidoderm) 5 %    lisinopril (ZESTRIL) 10 mg tablet    metoprolol tartrate (LOPRESSOR) 25 mg tablet    rivaroxaban (Xarelto) 20 mg tablet    polyethylene glycol (MIRALAX) 17 g packet      Pert[facility]inet Labs/diagnostics:  CBC with diff:   Results from last 7 days   Lab Units 06/19/25  0651   WBC Thousand/uL 7.32   HEMOGLOBIN g/dL 16.2   HEMATOCRIT % 48.4   MCV fL 97   PLATELETS Thousands/uL 240   RBC Million/uL 5.01   MCH pg 32.3   MCHC g/dL 33.5   RDW % 13.5   MPV fL 10.4   NRBC AUTO /100 WBCs 0       BMP:  Results from last 7 days   Lab Units 06/19/25  0651   POTASSIUM mmol/L 4.0   CHLORIDE mmol/L 108   CO2 mmol/L 34*   BUN mg/dL 12   CREATININE mg/dL 0.74   CALCIUM mg/dL 9.3       Magnesium:   Results from last 7 days   Lab Units 06/19/25  0651   MAGNESIUM mg/dL 2.2       Coags:   Results from last 7 days   Lab  Units 06/19/25  0651   INR  1.27*         Complications: none    Condition at Discharge: good     Discharge instructions/Information to patient and family:   See after visit summary for information provided to patient and family.      Provisions for Follow-Up Care:  See after visit summary for information related to follow-up care and any pertinent home health orders.      Disposition: Home    Planned Readmission: No    Discharge Statement   I spent 45 minutes minutes discharging the patient. This time was spent on the day of discharge. I had direct contact with the patient on the day of discharge. Additional documentation is required if more than 30 minutes were spent on discharge. Evaluating the incision, discussing discharge instructions and restrictions, arranging follow up appointments, discussing medications

## 2025-06-19 NOTE — ANESTHESIA POSTPROCEDURE EVALUATION
Post-Op Assessment Note    CV Status:  Stable    Pain management: adequate       Mental Status:  Alert and awake   Hydration Status:  Euvolemic   PONV Controlled:  Controlled   Airway Patency:  Patent     Post Op Vitals Reviewed: Yes    No anethesia notable event occurred.    Staff: Anesthesiologist, CRNA           Last Filed PACU Vitals:  Vitals Value Taken Time   Temp 97.5 °F (36.4 °C) 06/19/25 11:30   Pulse 51 06/19/25 12:08   /79 06/19/25 12:05   Resp 18 06/19/25 11:30   SpO2 93 % 06/19/25 12:08   Vitals shown include unfiled device data.

## 2025-06-19 NOTE — ANESTHESIA POSTPROCEDURE EVALUATION
Post-Op Assessment Note    CV Status:  Stable  Pain Score: 0    Pain management: adequate       Mental Status:  Awake and sleepy   Hydration Status:  Euvolemic   PONV Controlled:  Controlled   Airway Patency:  Patent  Two or more mitigation strategies used for obstructive sleep apnea  No anethesia notable event occurred.    Staff: CRNA           Last Filed PACU Vitals:  Vitals Value Taken Time   Temp 97.1    Pulse 50 06/19/25 11:32   /70    Resp 16    SpO2 99 % 06/19/25 11:32   Vitals shown include unfiled device data.

## 2025-06-19 NOTE — DISCHARGE INSTR - AVS FIRST PAGE
PLEASE NOTE THE FOLLOWING MEDICATION RECOMMENDATIONS:        RESTRICTIONS:  No heavy lifting or strenuous activity for one week.    No soaking in a bath tub/hot tub/swimming pool for one week or until groin heals. You may shower - please let soap and water run over the groins, no scrubbing, and pat the area dry. Please place band-aid on groins daily for up to five days, but you may remove sooner if no issues are noted.     If you notice ongoing bleeding, swelling, or firm lumps in groin near ablation incision, please contact Dr. Luther' office - [phone].

## 2025-06-19 NOTE — NURSING NOTE
Pt to holding bay 4. Wife at bedside. On NRM dressings clean dry and intact. VS remained stable throughout. Independently maintains O2 Sat on room air. Tolerating clears. IV fluids discontinued. Report to Raul. Transferred to [facility] by SHANNAN Garcia and BENJAMIN Hadley.

## 2025-06-20 LAB
ANION GAP SERPL CALCULATED.3IONS-SCNC: 4 MMOL/L (ref 4–13)
ATRIAL RATE: 46 BPM
ATRIAL RATE: 53 BPM
BUN SERPL-MCNC: 14 MG/DL (ref 5–25)
CALCIUM SERPL-MCNC: 8.6 MG/DL (ref 8.4–10.2)
CHLORIDE SERPL-SCNC: 109 MMOL/L (ref 96–108)
CO2 SERPL-SCNC: 30 MMOL/L (ref 21–32)
CREAT SERPL-MCNC: 0.66 MG/DL (ref 0.6–1.3)
ERYTHROCYTE [DISTWIDTH] IN BLOOD BY AUTOMATED COUNT: 13.8 % (ref 11.6–15.1)
GFR SERPL CREATININE-BSD FRML MDRD: 91 ML/MIN/1.73SQ M
GLUCOSE P FAST SERPL-MCNC: 108 MG/DL (ref 65–99)
GLUCOSE SERPL-MCNC: 108 MG/DL (ref 65–140)
HCT VFR BLD AUTO: 40.3 % (ref 36.5–49.3)
HGB BLD-MCNC: 13.4 G/DL (ref 12–17)
MAGNESIUM SERPL-MCNC: 2 MG/DL (ref 1.9–2.7)
MCH RBC QN AUTO: 31.9 PG (ref 26.8–34.3)
MCHC RBC AUTO-ENTMCNC: 33.3 G/DL (ref 31.4–37.4)
MCV RBC AUTO: 96 FL (ref 82–98)
P AXIS: -29 DEGREES
P AXIS: 1 DEGREES
PLATELET # BLD AUTO: 173 THOUSANDS/UL (ref 149–390)
PMV BLD AUTO: 10.4 FL (ref 8.9–12.7)
POTASSIUM SERPL-SCNC: 3.8 MMOL/L (ref 3.5–5.3)
PR INTERVAL: 178 MS
PR INTERVAL: 180 MS
QRS AXIS: -1 DEGREES
QRS AXIS: -24 DEGREES
QRSD INTERVAL: 92 MS
QRSD INTERVAL: 94 MS
QT INTERVAL: 492 MS
QT INTERVAL: 500 MS
QTC INTERVAL: 431 MS
QTC INTERVAL: 470 MS
RBC # BLD AUTO: 4.2 MILLION/UL (ref 3.88–5.62)
SODIUM SERPL-SCNC: 143 MMOL/L (ref 135–147)
T WAVE AXIS: -10 DEGREES
T WAVE AXIS: 25 DEGREES
VENTRICULAR RATE: 46 BPM
VENTRICULAR RATE: 53 BPM
WBC # BLD AUTO: 10.65 THOUSAND/UL (ref 4.31–10.16)

## 2025-06-20 PROCEDURE — 93010 ELECTROCARDIOGRAM REPORT: CPT | Performed by: INTERNAL MEDICINE

## 2025-06-20 PROCEDURE — 80048 BASIC METABOLIC PNL TOTAL CA: CPT | Performed by: PHYSICIAN ASSISTANT

## 2025-06-20 PROCEDURE — 83735 ASSAY OF MAGNESIUM: CPT | Performed by: PHYSICIAN ASSISTANT

## 2025-06-20 PROCEDURE — 85027 COMPLETE CBC AUTOMATED: CPT | Performed by: PHYSICIAN ASSISTANT

## 2025-06-20 RX ORDER — METOPROLOL TARTRATE 25 MG/1
12.5 TABLET, FILM COATED ORAL EVERY 12 HOURS SCHEDULED
Qty: 90 TABLET | Refills: 0 | Status: SHIPPED | OUTPATIENT
Start: 2025-06-20

## 2025-06-20 RX ORDER — FLECAINIDE ACETATE 100 MG/1
50 TABLET ORAL 2 TIMES DAILY
Qty: 180 TABLET | Refills: 0 | Status: SHIPPED | OUTPATIENT
Start: 2025-06-20

## 2025-06-20 RX ADMIN — LISINOPRIL 10 MG: 10 TABLET ORAL at 09:02

## 2025-06-20 RX ADMIN — ASPIRIN 81 MG CHEWABLE TABLET 81 MG: 81 TABLET CHEWABLE at 09:02

## 2025-06-20 RX ADMIN — ATORVASTATIN CALCIUM 40 MG: 40 TABLET, FILM COATED ORAL at 09:02

## 2025-06-20 NOTE — PLAN OF CARE
Problem: PAIN - ADULT  Goal: Verbalizes/displays adequate comfort level or baseline comfort level  Description: Interventions:  - Encourage patient to monitor pain and request assistance  - Assess pain using appropriate pain scale  - Administer analgesics as ordered based on type and severity of pain and evaluate response  - Implement non-pharmacological measures as appropriate and evaluate response  - Consider cultural and social influences on pain and pain management  - Notify physician/advanced practitioner if interventions unsuccessful or patient reports new pain  - Educate patient/family on pain management process including their role and importance of  reporting pain   - Provide non-pharmacologic/complimentary pain relief interventions  Outcome: Progressing     Problem: INFECTION - ADULT  Goal: Absence or prevention of progression during hospitalization  Description: INTERVENTIONS:  - Assess and monitor for signs and symptoms of infection  - Monitor lab/diagnostic results  - Monitor all insertion sites, i.e. indwelling lines, tubes, and drains  - Monitor endotracheal if appropriate and nasal secretions for changes in amount and color  - [place] appropriate cooling/warming therapies per order  - Administer medications as ordered  - Instruct and encourage patient and family to use good hand hygiene technique  - Identify and instruct in appropriate isolation precautions for identified infection/condition  Outcome: Progressing  Goal: Absence of fever/infection during neutropenic period  Description: INTERVENTIONS:  - Monitor WBC  - Perform strict hand hygiene  - Limit to healthy visitors only  - No plants, dried, fresh or silk flowers with lopez in patient room  Outcome: Progressing     Problem: SAFETY ADULT  Goal: Patient will remain free of falls  Description: INTERVENTIONS:  - Educate patient/family on patient safety including physical limitations  - Instruct patient to call for assistance with activity   -  Consider consulting OT/PT to assist with strengthening/mobility based on AM PAC & JH-HLM score  - Consult OT/PT to assist with strengthening/mobility   - Keep Call bell within reach  - Keep bed low and locked with side rails adjusted as appropriate  - Keep care items and personal belongings within reach  - Initiate and maintain comfort rounds  - Make Fall Risk Sign visible to staff  - Offer Toileting every  Hours, in advance of need  - Initiate/Maintain alarm  - Obtain necessary fall risk management equipment:   - Apply yellow socks and bracelet for high fall risk patients  - Consider moving patient to room near nurses station  Outcome: Progressing  Goal: Maintain or return to baseline ADL function  Description: INTERVENTIONS:  -  Assess patient's ability to carry out ADLs; assess patient's baseline for ADL function and identify physical deficits which impact ability to perform ADLs (bathing, care of mouth/teeth, toileting, grooming, dressing, etc.)  - Assess/evaluate cause of self-care deficits   - Assess range of motion  - Assess patient's mobility; develop plan if impaired  - Assess patient's need for assistive devices and provide as appropriate  - Encourage maximum independence but intervene and supervise when necessary  - Involve family in performance of ADLs  - Assess for home care needs following discharge   - Consider OT consult to assist with ADL evaluation and planning for discharge  - Provide patient education as appropriate  - Monitor functional capacity and physical performance, use of AM PAC & JH-HLM   - Monitor gait, balance and fatigue with ambulation    Outcome: Progressing  Goal: Maintains/Returns to pre admission functional level  Description: INTERVENTIONS:  - Perform AM-PAC 6 Click Basic Mobility/ Daily Activity assessment daily.  - Set and communicate daily mobility goal to care team and patient/family/caregiver.   - Collaborate with rehabilitation services on mobility goals if consulted  -  Perform Range of Motion  times a day.  - Reposition patient every hours.  - Dangle patient  times a day  - Stand patient  times a day  - Ambulate patient times a day  - Out of bed to chair  times a day   - Out of bed for meals  times a day  - Out of bed for toileting  - Record patient progress and toleration of activity level   Outcome: Progressing     Problem: DISCHARGE PLANNING  Goal: Discharge to home or other facility with appropriate resources  Description: INTERVENTIONS:  - Identify barriers to discharge w/patient and caregiver  - Arrange for needed discharge resources and transportation as appropriate  - Identify discharge learning needs (meds, wound care, etc.)  - Arrange for interpretive services to assist at discharge as needed  - Refer to Case Management Department for coordinating discharge planning if the patient needs post-hospital services based on physician/advanced practitioner order or complex needs related to functional status, cognitive ability, or social support system  Outcome: Progressing     Problem: Knowledge Deficit  Goal: Patient/family/caregiver demonstrates understanding of disease process, treatment plan, medications, and discharge instructions  Description: Complete learning assessment and assess knowledge base.  Interventions:  - Provide teaching at level of understanding  - Provide teaching via preferred learning methods  Outcome: Progressing

## 2025-06-21 VITALS
BODY MASS INDEX: 25.62 KG/M2 | HEART RATE: 84 BPM | SYSTOLIC BLOOD PRESSURE: 123 MMHG | OXYGEN SATURATION: 94 % | RESPIRATION RATE: 20 BRPM | TEMPERATURE: 98 F | HEIGHT: 70 IN | DIASTOLIC BLOOD PRESSURE: 58 MMHG | WEIGHT: 179 LBS

## 2025-06-25 ENCOUNTER — TELEPHONE (OUTPATIENT)
Age: 80
End: 2025-06-25

## 2025-06-25 NOTE — TELEPHONE ENCOUNTER
Caller: Darian Camargo    Doctor: Dr. Luther    Reason for call: pt was in the hospital and was told by Dr. Luther that he should wear a zio for 2 weeks. He stated that the doctor asked him where he wanted it sent, and pt replied '[place] office' since that's where he sees Dr. Luther. Can someone let pt know if it's been ordered, or if not, check with Dr. Luther. Please call pt back at    Call back#: [phone]

## 2025-06-26 DIAGNOSIS — I48.0 PAROXYSMAL A-FIB (HCC): Primary | ICD-10-CM

## 2025-06-26 DIAGNOSIS — Z98.890 S/P ABLATION OF ATRIAL FIBRILLATION: ICD-10-CM

## 2025-06-26 DIAGNOSIS — Z86.79 S/P ABLATION OF ATRIAL FIBRILLATION: ICD-10-CM

## 2025-07-22 ENCOUNTER — OFFICE VISIT (OUTPATIENT)
Dept: CARDIOLOGY CLINIC | Facility: CLINIC | Age: 80
End: 2025-07-22
Payer: COMMERCIAL

## 2025-07-22 VITALS
DIASTOLIC BLOOD PRESSURE: 56 MMHG | HEART RATE: 50 BPM | SYSTOLIC BLOOD PRESSURE: 112 MMHG | WEIGHT: 174 LBS | BODY MASS INDEX: 24.91 KG/M2 | HEIGHT: 70 IN

## 2025-07-22 DIAGNOSIS — Z98.890 S/P ABLATION OF ATRIAL FIBRILLATION: ICD-10-CM

## 2025-07-22 DIAGNOSIS — Z95.2 S/P TAVR (TRANSCATHETER AORTIC VALVE REPLACEMENT): ICD-10-CM

## 2025-07-22 DIAGNOSIS — I48.0 PAROXYSMAL A-FIB (HCC): Primary | ICD-10-CM

## 2025-07-22 DIAGNOSIS — I10 BENIGN ESSENTIAL HYPERTENSION: ICD-10-CM

## 2025-07-22 DIAGNOSIS — Z86.79 S/P ABLATION OF ATRIAL FIBRILLATION: ICD-10-CM

## 2025-07-22 PROCEDURE — 99214 OFFICE O/P EST MOD 30 MIN: CPT | Performed by: INTERNAL MEDICINE

## 2025-07-22 RX ORDER — AMOXICILLIN 500 MG/1
2000 CAPSULE ORAL AS NEEDED
COMMUNITY
Start: 2025-07-18

## 2025-07-30 DIAGNOSIS — I10 ESSENTIAL (PRIMARY) HYPERTENSION: ICD-10-CM

## 2025-07-30 DIAGNOSIS — M54.2 CERVICALGIA: ICD-10-CM

## 2025-07-30 DIAGNOSIS — R41.3 OTHER AMNESIA: ICD-10-CM

## 2025-07-30 DIAGNOSIS — M54.50 LOW BACK PAIN, UNSPECIFIED: ICD-10-CM

## 2025-07-31 ENCOUNTER — APPOINTMENT (OUTPATIENT)
Dept: RADIOLOGY | Facility: CLINIC | Age: 80
End: 2025-07-31
Attending: FAMILY MEDICINE
Payer: COMMERCIAL

## 2025-07-31 ENCOUNTER — APPOINTMENT (OUTPATIENT)
Dept: RADIOLOGY | Facility: CLINIC | Age: 80
End: 2025-07-31
Payer: COMMERCIAL

## 2025-07-31 DIAGNOSIS — M54.2 CERVICALGIA: ICD-10-CM

## 2025-07-31 DIAGNOSIS — M54.50 LOW BACK PAIN, UNSPECIFIED: ICD-10-CM

## 2025-07-31 PROCEDURE — 72100 X-RAY EXAM L-S SPINE 2/3 VWS: CPT

## 2025-07-31 PROCEDURE — 72040 X-RAY EXAM NECK SPINE 2-3 VW: CPT

## 2025-08-04 DIAGNOSIS — I48.0 PAF (PAROXYSMAL ATRIAL FIBRILLATION) (HCC): ICD-10-CM

## 2025-08-06 RX ORDER — RIVAROXABAN 20 MG/1
20 TABLET, FILM COATED ORAL
Qty: 90 TABLET | Refills: 1 | Status: SHIPPED | OUTPATIENT
Start: 2025-08-06

## 2025-08-08 ENCOUNTER — PREP FOR PROCEDURE (OUTPATIENT)
Dept: CARDIOLOGY CLINIC | Facility: CLINIC | Age: 80
End: 2025-08-08

## 2025-08-08 ENCOUNTER — TELEPHONE (OUTPATIENT)
Dept: CARDIOLOGY CLINIC | Facility: CLINIC | Age: 80
End: 2025-08-08

## 2025-08-08 ENCOUNTER — OFFICE VISIT (OUTPATIENT)
Dept: CARDIOLOGY CLINIC | Facility: CLINIC | Age: 80
End: 2025-08-08
Payer: COMMERCIAL

## 2025-08-08 VITALS
BODY MASS INDEX: 24.77 KG/M2 | HEIGHT: 70 IN | DIASTOLIC BLOOD PRESSURE: 68 MMHG | SYSTOLIC BLOOD PRESSURE: 130 MMHG | WEIGHT: 173 LBS | HEART RATE: 53 BPM

## 2025-08-08 DIAGNOSIS — I48.92 PAROXYSMAL ATRIAL FLUTTER (HCC): ICD-10-CM

## 2025-08-08 DIAGNOSIS — R00.2 PALPITATIONS: ICD-10-CM

## 2025-08-08 DIAGNOSIS — I48.0 PAROXYSMAL A-FIB (HCC): Primary | ICD-10-CM

## 2025-08-08 DIAGNOSIS — Z95.2 S/P TAVR (TRANSCATHETER AORTIC VALVE REPLACEMENT): ICD-10-CM

## 2025-08-08 DIAGNOSIS — I10 BENIGN ESSENTIAL HYPERTENSION: ICD-10-CM

## 2025-08-08 DIAGNOSIS — I48.0 PAF (PAROXYSMAL ATRIAL FIBRILLATION) (HCC): ICD-10-CM

## 2025-08-08 DIAGNOSIS — I48.0 PAROXYSMAL ATRIAL FIBRILLATION (HCC): ICD-10-CM

## 2025-08-08 PROCEDURE — 99215 OFFICE O/P EST HI 40 MIN: CPT | Performed by: STUDENT IN AN ORGANIZED HEALTH CARE EDUCATION/TRAINING PROGRAM

## 2025-08-08 PROCEDURE — 93000 ELECTROCARDIOGRAM COMPLETE: CPT | Performed by: STUDENT IN AN ORGANIZED HEALTH CARE EDUCATION/TRAINING PROGRAM

## 2025-08-21 ENCOUNTER — OFFICE VISIT (OUTPATIENT)
Dept: URGENT CARE | Facility: CLINIC | Age: 80
End: 2025-08-21
Payer: COMMERCIAL

## 2025-08-21 VITALS
HEART RATE: 54 BPM | TEMPERATURE: 96.9 F | SYSTOLIC BLOOD PRESSURE: 166 MMHG | OXYGEN SATURATION: 97 % | RESPIRATION RATE: 16 BRPM | DIASTOLIC BLOOD PRESSURE: 80 MMHG

## 2025-08-21 DIAGNOSIS — S46.911A SHOULDER STRAIN, RIGHT, INITIAL ENCOUNTER: ICD-10-CM

## 2025-08-21 DIAGNOSIS — M79.601 PAIN OF RIGHT UPPER EXTREMITY: Primary | ICD-10-CM

## 2025-08-21 PROCEDURE — 99213 OFFICE O/P EST LOW 20 MIN: CPT | Performed by: PREVENTIVE MEDICINE

## 2025-08-21 RX ORDER — LIDOCAINE 50 MG/G
1 PATCH TOPICAL DAILY
Qty: 15 PATCH | Refills: 0 | Status: SHIPPED | OUTPATIENT
Start: 2025-08-21

## (undated) DEVICE — THERMOFLECT BLANKET, L, 25EA                               TS THERMOFLECT BLANKET, 48" X 84", SILVER, 5/BG, 5 BG/CS NW: Brand: THERMOFLECT

## (undated) DEVICE — BETHLEHEM MAJOR GENERAL PACK: Brand: CARDINAL HEALTH

## (undated) DEVICE — GUIDEWIRE LUNDERQUIST TSCMG-35-300-LESDC

## (undated) DEVICE — RADIFOCUS OPTITORQUE ANGIOGRAPHIC CATHETER: Brand: OPTITORQUE

## (undated) DEVICE — SPONGE 4 X 4 XRAY 16 PLY STRL LF RFD

## (undated) DEVICE — Device

## (undated) DEVICE — CATH DIAG 6FR IMPULSE 100CM FR4

## (undated) DEVICE — CARDIOVASCULAR SPLIT DRAPE: Brand: CONVERTORS

## (undated) DEVICE — ROSEN CURVED WIRE GUIDE: Brand: ROSEN

## (undated) DEVICE — GLOVE SRG BIOGEL ECLIPSE 8

## (undated) DEVICE — GUIDEWIRE VASC SAFARI2 0.035IN DIA XSMALL 275CML

## (undated) DEVICE — CATH GUIDE LAUNCHER 6FR JR 4.0

## (undated) DEVICE — TRAY FOLEY 16FR SURESTEP TEMP SENS URIMETER STAT LOK

## (undated) DEVICE — DGW .035 FC J3MM 150CM TEF: Brand: EMERALD

## (undated) DEVICE — GLIDESHEATH BASIC HYDROPHILIC COATED INTRODUCER SHEATH: Brand: GLIDESHEATH

## (undated) DEVICE — HEAVY DUTY TABLE COVER: Brand: CONVERTORS

## (undated) DEVICE — Device: Brand: DECANAV

## (undated) DEVICE — INTENDED FOR TISSUE SEPARATION, AND OTHER PROCEDURES THAT REQUIRE A SHARP SURGICAL BLADE TO PUNCTURE OR CUT.: Brand: BARD-PARKER ® CARBON RIB-BACK BLADES

## (undated) DEVICE — GLOVE INDICATOR PI UNDERGLOVE SZ 8 BLUE

## (undated) DEVICE — 3M™ TEGADERM™ TRANSPARENT FILM DRESSING FRAME STYLE, 1624W, 2-3/8 IN X 2-3/4 IN (6 CM X 7 CM), 100/CT 4CT/CASE: Brand: 3M™ TEGADERM™

## (undated) DEVICE — CATH DIAG 6FR IMPULSE 110CM PIG 155 ANG

## (undated) DEVICE — CARDIO PERI-GROIN: Brand: CONVERTORS

## (undated) DEVICE — SWAN-GANZ BIPOLAR PACING CATHETER: Brand: SWAN-GANZ

## (undated) DEVICE — GUIDEWIRE WHOLEY HI TORQUE INTERM MOD J .035 145CM

## (undated) DEVICE — ASTOUND STANDARD SURGICAL GOWN, XXL: Brand: CONVERTORS

## (undated) DEVICE — Device: Brand: REFERENCE PATCH CARTO 3

## (undated) DEVICE — PINNACLE INTRODUCER SHEATH: Brand: PINNACLE

## (undated) DEVICE — 3000CC GUARDIAN II: Brand: GUARDIAN

## (undated) DEVICE — SHEATH STEERABLE FARADRIVE

## (undated) DEVICE — SUT SILK 0 CT-1 30 IN 424H

## (undated) DEVICE — PAD GROUNDING DUAL ADULT

## (undated) DEVICE — OCTA,PERSEID,2-2-2-2-2,D-CURVE: Brand: OCTARAY MAPPING CATHETER

## (undated) DEVICE — Device: Brand: SOUNDSTAR

## (undated) DEVICE — 1 X VERSACROSS CONNECT TRANSSEPTAL DILATOR;  1 X VERSACROSS RF WIRE (INCLUDING 1 X CONNECTOR CABLE (SINGLE USE)): Brand: VERSACROSS CONNECT ACCESS SOLUTION FOR FARADRIVE

## (undated) DEVICE — CATH ABLATION FARAWAVE PULSED FIELD 31MM

## (undated) DEVICE — TR BAND RADIAL ARTERY COMPRESSION DEVICE: Brand: TR BAND

## (undated) DEVICE — PACK CUSTOM TAVR

## (undated) DEVICE — CABLE CATH CONNECTION FARASTAR

## (undated) DEVICE — ALL PURPOSE SPONGES,NONWOVEN, 4 PLY: Brand: CURITY

## (undated) DEVICE — PACK CUSTOM PERFUSION PLEG PK

## (undated) DEVICE — ADHESIVE SKIN HIGH VISCOSITY EXOFIN 1ML

## (undated) DEVICE — DGW .035 FC STR 260CM TEF: Brand: EMERALD

## (undated) DEVICE — PACK CUSTOM PERFUSION ANH